# Patient Record
Sex: FEMALE | Race: WHITE | Employment: UNEMPLOYED | URBAN - METROPOLITAN AREA
[De-identification: names, ages, dates, MRNs, and addresses within clinical notes are randomized per-mention and may not be internally consistent; named-entity substitution may affect disease eponyms.]

---

## 2017-01-09 ENCOUNTER — ANESTHESIA EVENT (OUTPATIENT)
Dept: SURGERY | Age: 62
End: 2017-01-09
Payer: SELF-PAY

## 2017-01-09 ENCOUNTER — DOCUMENTATION ONLY (OUTPATIENT)
Dept: FAMILY MEDICINE CLINIC | Age: 62
End: 2017-01-09

## 2017-01-09 ENCOUNTER — TELEPHONE (OUTPATIENT)
Dept: FAMILY MEDICINE CLINIC | Age: 62
End: 2017-01-09

## 2017-01-09 NOTE — PROGRESS NOTES
Receipt of patient's Pap order of Detrol LA logged into the log book. Patient has an appointment on 17 at [address] [place] [address] at 2pm with Velvet Herndon. Routing to provider for dose confirmation and permission to deliver to patient. Nanci Vera RN  tolterodine ER (DETROL-LA) 2 mg ER capsule [[id number]]   Order Details   Dose: 2 mg Route: Oral Frequency: DAILY   Dispense Quantity:  30 Cap Refills:  0 Fills Remainin           Sig: Take 1 Cap by mouth daily.          Written Date:  16 Expiration Date:  --     Start Date:  16 End Date:  --            Ordering Provider:  -- NATA #:  -- NPI:  --    Authorizing Provider:   CARRIE Mcclain NATA #:  [id number] NPI:  [id number]    Diagnosis Association: Ureteral obstruction, left (N13.5)      Original Order:  tolterodine ER (DETROL-LA) 2 mg ER capsule [[id number]]      Ph[facility]:  [id number] [place] [address] NATA #:

## 2017-01-10 ENCOUNTER — APPOINTMENT (OUTPATIENT)
Dept: GENERAL RADIOLOGY | Age: 62
End: 2017-01-10
Attending: UROLOGY
Payer: SELF-PAY

## 2017-01-10 ENCOUNTER — ANESTHESIA (OUTPATIENT)
Dept: SURGERY | Age: 62
End: 2017-01-10
Payer: SELF-PAY

## 2017-01-10 ENCOUNTER — SURGERY (OUTPATIENT)
Age: 62
End: 2017-01-10

## 2017-01-10 PROCEDURE — 74011250636 HC RX REV CODE- 250/636: Performed by: UROLOGY

## 2017-01-10 PROCEDURE — 74011250636 HC RX REV CODE- 250/636

## 2017-01-10 PROCEDURE — 74011000250 HC RX REV CODE- 250

## 2017-01-10 PROCEDURE — 74420 UROGRAPHY RTRGR +-KUB: CPT

## 2017-01-10 PROCEDURE — 77030020782 HC GWN BAIR PAWS FLX 3M -B

## 2017-01-10 PROCEDURE — 77030008684 HC TU ET CUF COVD -B: Performed by: ANESTHESIOLOGY

## 2017-01-10 RX ORDER — LIDOCAINE HYDROCHLORIDE 20 MG/ML
INJECTION, SOLUTION EPIDURAL; INFILTRATION; INTRACAUDAL; PERINEURAL AS NEEDED
Status: DISCONTINUED | OUTPATIENT
Start: 2017-01-10 | End: 2017-01-10 | Stop reason: HOSPADM

## 2017-01-10 RX ORDER — MIDAZOLAM HYDROCHLORIDE 1 MG/ML
INJECTION, SOLUTION INTRAMUSCULAR; INTRAVENOUS AS NEEDED
Status: DISCONTINUED | OUTPATIENT
Start: 2017-01-10 | End: 2017-01-10 | Stop reason: HOSPADM

## 2017-01-10 RX ORDER — ONDANSETRON 2 MG/ML
INJECTION INTRAMUSCULAR; INTRAVENOUS AS NEEDED
Status: DISCONTINUED | OUTPATIENT
Start: 2017-01-10 | End: 2017-01-10 | Stop reason: HOSPADM

## 2017-01-10 RX ORDER — ESMOLOL HYDROCHLORIDE 10 MG/ML
INJECTION INTRAVENOUS AS NEEDED
Status: DISCONTINUED | OUTPATIENT
Start: 2017-01-10 | End: 2017-01-10 | Stop reason: HOSPADM

## 2017-01-10 RX ORDER — CEFAZOLIN SODIUM 1 G/3ML
INJECTION, POWDER, FOR SOLUTION INTRAMUSCULAR; INTRAVENOUS AS NEEDED
Status: DISCONTINUED | OUTPATIENT
Start: 2017-01-10 | End: 2017-01-10

## 2017-01-10 RX ORDER — FENTANYL CITRATE 50 UG/ML
INJECTION, SOLUTION INTRAMUSCULAR; INTRAVENOUS AS NEEDED
Status: DISCONTINUED | OUTPATIENT
Start: 2017-01-10 | End: 2017-01-10 | Stop reason: HOSPADM

## 2017-01-10 RX ORDER — SUCCINYLCHOLINE CHLORIDE 20 MG/ML
INJECTION INTRAMUSCULAR; INTRAVENOUS AS NEEDED
Status: DISCONTINUED | OUTPATIENT
Start: 2017-01-10 | End: 2017-01-10 | Stop reason: HOSPADM

## 2017-01-10 RX ORDER — PROPOFOL 10 MG/ML
INJECTION, EMULSION INTRAVENOUS AS NEEDED
Status: DISCONTINUED | OUTPATIENT
Start: 2017-01-10 | End: 2017-01-10 | Stop reason: HOSPADM

## 2017-01-10 RX ADMIN — ONDANSETRON 4 MG: 2 INJECTION INTRAMUSCULAR; INTRAVENOUS at 13:39

## 2017-01-10 RX ADMIN — MIDAZOLAM HYDROCHLORIDE 1 MG: 1 INJECTION, SOLUTION INTRAMUSCULAR; INTRAVENOUS at 13:23

## 2017-01-10 RX ADMIN — LIDOCAINE HYDROCHLORIDE 50 MG: 20 INJECTION, SOLUTION EPIDURAL; INFILTRATION; INTRACAUDAL; PERINEURAL at 13:29

## 2017-01-10 RX ADMIN — FENTANYL CITRATE 100 MCG: 50 INJECTION, SOLUTION INTRAMUSCULAR; INTRAVENOUS at 13:29

## 2017-01-10 RX ADMIN — LIDOCAINE HYDROCHLORIDE 100 MG: 20 INJECTION, SOLUTION INTRAVENOUS at 13:58

## 2017-01-10 RX ADMIN — CEFAZOLIN 2 G: 1 INJECTION, POWDER, FOR SOLUTION INTRAMUSCULAR; INTRAVENOUS; PARENTERAL at 13:38

## 2017-01-10 RX ADMIN — ESMOLOL HYDROCHLORIDE 20 MG: 10 INJECTION INTRAVENOUS at 13:56

## 2017-01-10 RX ADMIN — DIATRIZOATE MEGLUMINE 20 ML: 300 INJECTION, SOLUTION INTRAVENOUS at 13:59

## 2017-01-10 RX ADMIN — SUCCINYLCHOLINE CHLORIDE 100 MG: 20 INJECTION INTRAMUSCULAR; INTRAVENOUS at 13:29

## 2017-01-10 RX ADMIN — PROPOFOL 150 MG: 10 INJECTION, EMULSION INTRAVENOUS at 13:29

## 2017-01-10 NOTE — ANESTHESIA PREPROCEDURE EVALUATION
Anesthetic History   No history of anesthetic complications            Review of Systems / Medical History  Patient summary reviewed, nursing notes reviewed and pertinent labs reviewed    Pulmonary  Within defined limits                 Neuro/Psych         Psychiatric history    Comments: Mental retardation Cardiovascular    Hypertension: well controlled          Past MI, CAD and hyperlipidemia  Pertinent negatives: No cardiac stents       GI/Hepatic/Renal  Within defined limits             Comments: S/p nephrectomy Endo/Other    Diabetes: poorly controlled    Anemia     Other Findings              Physical Exam    Airway  Mallampati: II  TM Distance: < 4 cm  Neck ROM: normal range of motion   Mouth opening: Normal     Cardiovascular  Regular rate and rhythm,  S1 and S2 normal,  no murmur, click, rub, or gallop  Rhythm: regular  Rate: normal      Pertinent negatives: No murmur   Dental    Dentition: Edentulous and Full upper dentures     Pulmonary  Breath sounds clear to auscultation               Abdominal  GI exam deferred       Other Findings            Anesthetic Plan    ASA: 3  Anesthesia type: general          Induction: Intravenous  Anesthetic plan and risks discussed with: Patient

## 2017-01-10 NOTE — ANESTHESIA POSTPROCEDURE EVALUATION
Post-Anesthesia Evaluation and Assessment    Patient: Mary Alejandro MRN: [id number]  SSN: [id number]    YOB: 1955  Age: 62 y.o. Sex: female       Cardiovascular Function/Vital Signs  Visit Vitals    /68    Pulse (!) 117    Temp 36.6 °C (97.9 °F)    Resp 27    Ht 4' 6\" (1.372 m)    Wt 73.8 kg (162 lb 11.2 oz)    SpO2 99%    BMI 39.23 kg/m2       Patient is status post general anesthesia for Procedure(s):  CYSTOSCOPY WITH LEFT RETROGRADES LEFT STENT CHANGE . Nausea/Vomiting: None    Postoperative hydration reviewed and adequate. Pain:  Pain Scale 1: Numeric (0 - 10) (01/10/17 1416)  Pain Intensity 1: 0 (01/10/17 1416)   Managed    Neurological Status:   Neuro (WDL): Within Defined Limits (01/10/17 1416)  Neuro  LUE Motor Response: Purposeful (01/10/17 1416)  LLE Motor Response: Purposeful (01/10/17 1416)  RUE Motor Response: Purposeful (01/10/17 1416)  RLE Motor Response: Purposeful (01/10/17 1416)   At baseline    Mental Status and Level of Consciousness: Arousable    Pulmonary Status:   O2 Device: Room air (01/10/17 1416)   Adequate oxygenation and airway patent    Complications related to anesthesia: None    Post-anesthesia assessment completed.  No concerns    Signed By: Brian Storm MD     January 10, 2017

## 2017-01-11 ENCOUNTER — PATIENT OUTREACH (OUTPATIENT)
Dept: FAMILY MEDICINE CLINIC | Age: 62
End: 2017-01-11

## 2017-01-11 NOTE — PROGRESS NOTES
Recent Labs      01/10/17   1402   NA  139   K  4.4   CL  106   CO2  23   GLU  109*   BUN  17   CREA  1.28*   CA  8.2*     No components found for: GLPOC  No results for input(s): PH, PCO2, PO2, HCO3, FIO2 in the last 72 hours. No results for input(s): INR in the last 72 hours. No lab exists for component: INREXT    Language spoken:   [facility]:   Patient was offered the opportunity to discuss advance care planning:  no     Does patient have an Advance Directive:  no   If no, did you provide information on [facility]?  no     PCP/Specialist follow up: Patient scheduled to follow up with Alpesh Wang NP on 1/23/17. Reviewed red flags with patient, and patient verbalizes understanding. Patient given an opportunity to ask questions. No other clinical/social/functional needs noted. The patient agrees to contact the PCP office for questions related to their healthcare. The patient expressed thanks, offered no additional questions and ended the call. Care giver Arsenio José spoke for patient. Medication:   Performed medication reconciliation with patient, and patient verbalizes understanding of administration of home medications. There were no barriers to obtaining medications identified at this time. EDUCATION:   Medications, FU appt with Dr. Moisés Denny states that she was told to wait for UA, C/S results and they will schedule the appointment. Support system:  patient and other:  Care giver- Arsenio José    Discharge Instructions :  Reviewed discharge instructions with patient. Patient verbalizes understanding of discharge instructions and follow-up care. Red Flags:  Reviewed S/x and Sx of surgical complications. Denies any pain. Labs Reviewed:  No results for input(s): WBC, HGB, HCT, PLT, HGBEXT, HCTEXT, PLTEXT in the last 72 hours.       Medical History:     Past Medical History   Diagnosis Date    Anemia     CKD (chronic kidney disease), stage II      only has one kidney (on right)    Coronary artery disease 2014     A. Echo (14):  EF 45% with mid-distal septal/anterior HK, DD. PASP 37.    Coronary artery disease      MI 2014    Depression     Diabetes ([facility]     [facility]     History of nephrectomy      Right kidney    Hx MRSA infection      had negative swab 2013    Hyperlipidemia     Hypertension     Mental retardation     Nephrolithiasis      hx    Uterine cancer ([facility]      hx    UTI (lower urinary tract infection)     Vitamin D deficiency            Nurse Navigator(NN) contacted the patient by telephone to perform post same day surgery discharge assessment. Verified  and address with patient as identifiers. Provided introduction to self, and explanation of the Nurses Navigator role with contact information.       Diet:   Patient reports: Regular Diet    Activity:    Patient reports: mostly moving around the house

## 2017-01-11 NOTE — PROGRESS NOTES
[address]    Closing post hosp episode of care for [facility] admission from 11/28/16 to 12/1/16.  Pt had scheduled stent replacement surgery on 1/10/17 and had follow up call from Edith RODRIGUEZ Angie Hoops, RN

## 2017-01-13 ENCOUNTER — HOSPITAL ENCOUNTER (INPATIENT)
Age: 62
LOS: 3 days | Discharge: HOME | DRG: 700 | End: 2017-01-16
Attending: EMERGENCY MEDICINE | Admitting: INTERNAL MEDICINE
Payer: SELF-PAY

## 2017-01-13 DIAGNOSIS — N39.0 URINARY TRACT INFECTION, SITE UNSPECIFIED: Primary | ICD-10-CM

## 2017-01-13 PROBLEM — N18.30 CKD (CHRONIC KIDNEY DISEASE), STAGE III (HCC): Status: ACTIVE | Noted: 2017-01-13

## 2017-01-13 LAB
ALBUMIN SERPL BCP-MCNC: 3.1 G/DL (ref 3.5–5)
ALBUMIN/GLOB SERPL: 0.7 {RATIO} (ref 1.1–2.2)
ALP SERPL-CCNC: 116 U/L (ref 45–117)
ALT SERPL-CCNC: 14 U/L (ref 12–78)
ANION GAP BLD CALC-SCNC: 9 MMOL/L (ref 5–15)
APPEARANCE UR: CLEAR
AST SERPL W P-5'-P-CCNC: 24 U/L (ref 15–37)
BACTERIA URNS QL MICRO: ABNORMAL /HPF
BASOPHILS # BLD AUTO: 0 K/UL (ref 0–0.1)
BASOPHILS # BLD: 1 % (ref 0–1)
BILIRUB SERPL-MCNC: 0.2 MG/DL (ref 0.2–1)
BILIRUB UR QL: NEGATIVE
BUN SERPL-MCNC: 19 MG/DL (ref 6–20)
BUN/CREAT SERPL: 12 (ref 12–20)
CALCIUM SERPL-MCNC: 8.3 MG/DL (ref 8.5–10.1)
CHLORIDE SERPL-SCNC: 103 MMOL/L (ref 97–108)
CO2 SERPL-SCNC: 24 MMOL/L (ref 21–32)
COLOR UR: ABNORMAL
CREAT SERPL-MCNC: 1.6 MG/DL (ref 0.55–1.02)
EOSINOPHIL # BLD: 0.1 K/UL (ref 0–0.4)
EOSINOPHIL NFR BLD: 1 % (ref 0–7)
EPITH CASTS URNS QL MICRO: ABNORMAL /LPF
ERYTHROCYTE [DISTWIDTH] IN BLOOD BY AUTOMATED COUNT: 17.1 % (ref 11.5–14.5)
GLOBULIN SER CALC-MCNC: 4.4 G/DL (ref 2–4)
GLUCOSE BLD STRIP.AUTO-MCNC: 132 MG/DL (ref 65–100)
GLUCOSE SERPL-MCNC: 81 MG/DL (ref 65–100)
GLUCOSE UR STRIP.AUTO-MCNC: NEGATIVE MG/DL
HCT VFR BLD AUTO: 30.2 % (ref 35–47)
HGB BLD-MCNC: 9.1 G/DL (ref 11.5–16)
HGB UR QL STRIP: ABNORMAL
KETONES UR QL STRIP.AUTO: NEGATIVE MG/DL
LEUKOCYTE ESTERASE UR QL STRIP.AUTO: ABNORMAL
LYMPHOCYTES # BLD AUTO: 17 % (ref 12–49)
LYMPHOCYTES # BLD: 1.2 K/UL (ref 0.8–3.5)
MCH RBC QN AUTO: 27.1 PG (ref 26–34)
MCHC RBC AUTO-ENTMCNC: 30.1 G/DL (ref 30–36.5)
MCV RBC AUTO: 89.9 FL (ref 80–99)
MONOCYTES # BLD: 0.5 K/UL (ref 0–1)
MONOCYTES NFR BLD AUTO: 8 % (ref 5–13)
NEUTS SEG # BLD: 5.1 K/UL (ref 1.8–8)
NEUTS SEG NFR BLD AUTO: 73 % (ref 32–75)
NITRITE UR QL STRIP.AUTO: NEGATIVE
PH UR STRIP: 7 [PH] (ref 5–8)
PLATELET # BLD AUTO: 144 K/UL (ref 150–400)
POTASSIUM SERPL-SCNC: 4.7 MMOL/L (ref 3.5–5.1)
PROT SERPL-MCNC: 7.5 G/DL (ref 6.4–8.2)
PROT UR STRIP-MCNC: 30 MG/DL
RBC # BLD AUTO: 3.36 M/UL (ref 3.8–5.2)
RBC #/AREA URNS HPF: ABNORMAL /HPF (ref 0–5)
SERVICE CMNT-IMP: ABNORMAL
SODIUM SERPL-SCNC: 136 MMOL/L (ref 136–145)
SP GR UR REFRACTOMETRY: 1.01 (ref 1–1.03)
UA: UC IF INDICATED,UAUC: ABNORMAL
UROBILINOGEN UR QL STRIP.AUTO: 0.2 EU/DL (ref 0.2–1)
WBC # BLD AUTO: 6.9 K/UL (ref 3.6–11)
WBC URNS QL MICRO: ABNORMAL /HPF (ref 0–4)

## 2017-01-13 PROCEDURE — 36415 COLL VENOUS BLD VENIPUNCTURE: CPT | Performed by: EMERGENCY MEDICINE

## 2017-01-13 PROCEDURE — 80053 COMPREHEN METABOLIC PANEL: CPT | Performed by: EMERGENCY MEDICINE

## 2017-01-13 PROCEDURE — 87086 URINE CULTURE/COLONY COUNT: CPT | Performed by: INTERNAL MEDICINE

## 2017-01-13 PROCEDURE — 74011000258 HC RX REV CODE- 258: Performed by: EMERGENCY MEDICINE

## 2017-01-13 PROCEDURE — 74011250636 HC RX REV CODE- 250/636: Performed by: INTERNAL MEDICINE

## 2017-01-13 PROCEDURE — 82962 GLUCOSE BLOOD TEST: CPT

## 2017-01-13 PROCEDURE — 85025 COMPLETE CBC W/AUTO DIFF WBC: CPT | Performed by: EMERGENCY MEDICINE

## 2017-01-13 PROCEDURE — 74011250636 HC RX REV CODE- 250/636: Performed by: EMERGENCY MEDICINE

## 2017-01-13 PROCEDURE — 81001 URINALYSIS AUTO W/SCOPE: CPT | Performed by: EMERGENCY MEDICINE

## 2017-01-13 PROCEDURE — 87040 BLOOD CULTURE FOR BACTERIA: CPT | Performed by: EMERGENCY MEDICINE

## 2017-01-13 PROCEDURE — 74011250637 HC RX REV CODE- 250/637: Performed by: INTERNAL MEDICINE

## 2017-01-13 PROCEDURE — 65270000029 HC RM PRIVATE

## 2017-01-13 PROCEDURE — 99284 EMERGENCY DEPT VISIT MOD MDM: CPT

## 2017-01-13 RX ORDER — TOLTERODINE 2 MG/1
2 CAPSULE, EXTENDED RELEASE ORAL
Status: DISCONTINUED | OUTPATIENT
Start: 2017-01-13 | End: 2017-01-16 | Stop reason: HOSPADM

## 2017-01-13 RX ORDER — METOPROLOL TARTRATE 25 MG/1
12.5 TABLET, FILM COATED ORAL 2 TIMES DAILY
COMMUNITY
End: 2017-09-06 | Stop reason: SDUPTHER

## 2017-01-13 RX ORDER — GLIPIZIDE 2.5 MG/1
2.5 TABLET, EXTENDED RELEASE ORAL
Status: DISCONTINUED | OUTPATIENT
Start: 2017-01-14 | End: 2017-01-15

## 2017-01-13 RX ORDER — SODIUM CHLORIDE 0.9 % (FLUSH) 0.9 %
5-10 SYRINGE (ML) INJECTION AS NEEDED
Status: DISCONTINUED | OUTPATIENT
Start: 2017-01-13 | End: 2017-01-16 | Stop reason: HOSPADM

## 2017-01-13 RX ORDER — HYDROCODONE BITARTRATE AND ACETAMINOPHEN 5; 325 MG/1; MG/1
1 TABLET ORAL
Status: DISCONTINUED | OUTPATIENT
Start: 2017-01-13 | End: 2017-01-16 | Stop reason: HOSPADM

## 2017-01-13 RX ORDER — ASPIRIN 81 MG/1
81 TABLET ORAL
Status: DISCONTINUED | OUTPATIENT
Start: 2017-01-13 | End: 2017-01-16 | Stop reason: HOSPADM

## 2017-01-13 RX ORDER — PIOGLITAZONEHYDROCHLORIDE 15 MG/1
30 TABLET ORAL DAILY
Status: DISCONTINUED | OUTPATIENT
Start: 2017-01-14 | End: 2017-01-16 | Stop reason: HOSPADM

## 2017-01-13 RX ORDER — METOPROLOL TARTRATE 25 MG/1
12.5 TABLET, FILM COATED ORAL 2 TIMES DAILY
Status: DISCONTINUED | OUTPATIENT
Start: 2017-01-13 | End: 2017-01-16 | Stop reason: HOSPADM

## 2017-01-13 RX ORDER — SODIUM CHLORIDE 9 MG/ML
75 INJECTION, SOLUTION INTRAVENOUS CONTINUOUS
Status: DISCONTINUED | OUTPATIENT
Start: 2017-01-13 | End: 2017-01-16 | Stop reason: HOSPADM

## 2017-01-13 RX ORDER — SERTRALINE HYDROCHLORIDE 50 MG/1
50 TABLET, FILM COATED ORAL DAILY
Status: DISCONTINUED | OUTPATIENT
Start: 2017-01-14 | End: 2017-01-16 | Stop reason: HOSPADM

## 2017-01-13 RX ORDER — MAGNESIUM SULFATE 100 %
4 CRYSTALS MISCELLANEOUS AS NEEDED
Status: DISCONTINUED | OUTPATIENT
Start: 2017-01-13 | End: 2017-01-16 | Stop reason: HOSPADM

## 2017-01-13 RX ORDER — ATORVASTATIN CALCIUM 10 MG/1
10 TABLET, FILM COATED ORAL DAILY
Status: DISCONTINUED | OUTPATIENT
Start: 2017-01-14 | End: 2017-01-16 | Stop reason: HOSPADM

## 2017-01-13 RX ORDER — INSULIN LISPRO 100 [IU]/ML
INJECTION, SOLUTION INTRAVENOUS; SUBCUTANEOUS
Status: DISCONTINUED | OUTPATIENT
Start: 2017-01-13 | End: 2017-01-14

## 2017-01-13 RX ORDER — CEPHALEXIN 250 MG/1
250 CAPSULE ORAL DAILY
COMMUNITY
End: 2017-01-16

## 2017-01-13 RX ORDER — GLIPIZIDE 2.5 MG/1
2.5 TABLET, EXTENDED RELEASE ORAL 2 TIMES DAILY
COMMUNITY
End: 2017-01-16

## 2017-01-13 RX ORDER — PIOGLITAZONEHYDROCHLORIDE 30 MG/1
30 TABLET ORAL DAILY
COMMUNITY
End: 2017-05-24 | Stop reason: SDUPTHER

## 2017-01-13 RX ORDER — MULTIVITAMIN WITH IRON
1 TABLET ORAL DAILY
Status: DISCONTINUED | OUTPATIENT
Start: 2017-01-14 | End: 2017-01-16 | Stop reason: HOSPADM

## 2017-01-13 RX ORDER — HEPARIN SODIUM 5000 [USP'U]/ML
5000 INJECTION, SOLUTION INTRAVENOUS; SUBCUTANEOUS EVERY 8 HOURS
Status: DISCONTINUED | OUTPATIENT
Start: 2017-01-13 | End: 2017-01-16 | Stop reason: HOSPADM

## 2017-01-13 RX ORDER — LANOLIN ALCOHOL/MO/W.PET/CERES
1000 CREAM (GRAM) TOPICAL DAILY
Status: DISCONTINUED | OUTPATIENT
Start: 2017-01-14 | End: 2017-01-16 | Stop reason: HOSPADM

## 2017-01-13 RX ORDER — DEXTROSE 50 % IN WATER (D50W) INTRAVENOUS SYRINGE
12.5-25 AS NEEDED
Status: DISCONTINUED | OUTPATIENT
Start: 2017-01-13 | End: 2017-01-16 | Stop reason: HOSPADM

## 2017-01-13 RX ORDER — TOLTERODINE 2 MG/1
2 CAPSULE, EXTENDED RELEASE ORAL
COMMUNITY
End: 2017-06-30 | Stop reason: SDUPTHER

## 2017-01-13 RX ORDER — SODIUM CHLORIDE 0.9 % (FLUSH) 0.9 %
5-10 SYRINGE (ML) INJECTION EVERY 8 HOURS
Status: DISCONTINUED | OUTPATIENT
Start: 2017-01-13 | End: 2017-01-16 | Stop reason: HOSPADM

## 2017-01-13 RX ORDER — ASPIRIN 81 MG/1
81 TABLET ORAL
COMMUNITY

## 2017-01-13 RX ADMIN — METOPROLOL TARTRATE 12.5 MG: 25 TABLET ORAL at 23:05

## 2017-01-13 RX ADMIN — SODIUM CHLORIDE 75 ML/HR: 900 INJECTION, SOLUTION INTRAVENOUS at 23:05

## 2017-01-13 RX ADMIN — MEROPENEM 500 MG: 500 INJECTION, POWDER, FOR SOLUTION INTRAVENOUS at 20:39

## 2017-01-13 RX ADMIN — ASPIRIN 81 MG: 81 TABLET, COATED ORAL at 23:05

## 2017-01-13 RX ADMIN — Medication 10 ML: at 23:10

## 2017-01-13 RX ADMIN — HEPARIN SODIUM 5000 UNITS: 5000 INJECTION, SOLUTION INTRAVENOUS; SUBCUTANEOUS at 23:06

## 2017-01-13 RX ADMIN — TOLTERODINE TARTRATE 2 MG: 2 CAPSULE, EXTENDED RELEASE ORAL at 23:05

## 2017-01-13 NOTE — IP AVS SNAPSHOT
Current Discharge Medication List  
  
Take these medications at their scheduled times Dose & Instructions Dispensing Information Comments Morning Noon Evening Bedtime ACTOS 30 mg tablet Generic drug:  pioglitazone Your next dose is: Today, Tomorrow Other:  ____________ Dose:  30 mg Take 30 mg by mouth daily. Refills:  0  
     
   
   
   
  
 aspirin delayed-release 81 mg tablet Your next dose is: Today, Tomorrow Other:  ____________ Dose:  81 mg Take 81 mg by mouth nightly. Refills:  0  
     
   
   
   
  
 atorvastatin 10 mg tablet Commonly known as:  LIPITOR Your next dose is: Today, Tomorrow Other:  ____________ Dose:  10 mg Take 1 Tab by mouth daily. To start this when you run out of crestor Quantity:  90 Tab Refills:  1  
     
   
   
   
  
 cyanocobalamin 1,000 mcg tablet Your next dose is: Today, Tomorrow Other:  ____________ Dose:  1000 mcg Take 1 Tab by mouth daily. For B12 Quantity:  180 Tab Refills:  1  
     
   
   
   
  
 glipiZIDE SR 2.5 mg CR tablet Commonly known as:  GLUCOTROL XL Your next dose is: Today, Tomorrow Other:  ____________ Dose:  2.5 mg Take 1 Tab by mouth Daily (before breakfast). Quantity:  30 Tab Refills:  0  
     
   
   
   
  
 metoprolol tartrate 25 mg tablet Commonly known as:  LOPRESSOR Your next dose is: Today, Tomorrow Other:  ____________ Dose:  12.5 mg Take 12.5 mg by mouth two (2) times a day. Refills:  0  
     
   
   
   
  
 multivitamin with iron tablet Your next dose is: Today, Tomorrow Other:  ____________ Dose:  1 Tab Take 1 Tab by mouth daily. Refills:  0  
     
   
   
   
  
 tolterodine ER 2 mg ER capsule Commonly known as:  DETROL-LA Your next dose is: Today, Tomorrow Other:  ____________ Dose:  2 mg Take 2 mg by mouth nightly. Refills:  0 Take these medications as needed Dose & Instructions Dispensing Information Comments Morning Noon Evening Bedtime HYDROcodone-acetaminophen 5-325 mg per tablet Commonly known as:  Sean Andres Your next dose is: Today, Tomorrow Other:  ____________ Dose:  1 Tab Take 1 Tab by mouth every four (4) hours as needed for up to 15 doses. Max Daily Amount: 6 Tabs. Quantity:  15 Tab Refills:  0 Take these medications as directed Dose & Instructions Dispensing Information Comments Morning Noon Evening Bedtime  
 sertraline 50 mg tablet Commonly known as:  ZOLOFT Your next dose is: Today, Tomorrow Other:  ____________  
   
   
 take 1 tablet by mouth once daily (BEFORE BREAKFAST) Quantity:  90 Tab Refills:  3 Where to Get Your Medications Information about where to get these medications is not yet available ! Ask your nurse or doctor about these medications  
  glipiZIDE SR 2.5 mg CR tablet

## 2017-01-13 NOTE — Clinical Note
Status[de-identified] Inpatient [101] Type of Bed: Medical [8] Inpatient Hospitalization Certified Necessary for the Following Reasons: 3. Patient receiving treatment that can only be provided in an inpatient setting (further clarification in H&P documentation) Admitting Diagnosis: UTI (urinary tract infection) [604526] Admitting Physician: Mary Recinos Attending Physician: Mary Recinos Estimated Length of Stay: 3-4 Midnights Discharge Plan[de-identified] Home with Office Follow-up

## 2017-01-13 NOTE — ED TRIAGE NOTES
Called to come to hospital by Dr. Thomas Bang because culture indicated she needs IV antibiotics for infection, has a stent left ureter. Arrives with caregiver from group home.

## 2017-01-13 NOTE — IP AVS SNAPSHOT
Jeannette Misti 
 
 
 [address] [facility] 
[phone] Patient: Criss Robins MRN: [id number] HSP:[id number] You are allergic to the following Allergen Reactions Hydrocodone Nausea Only Ibuprofen Unknown (comments) Pt cannot take because of having only one kidney Penicillins Rash Tolerates Cephalexin Recent Documentation Height Weight Breastfeeding? BMI OB Status Smoking Status 1.422 m 72.8 kg No 35.98 kg/m2 Hysterectomy Never Smoker Unresulted Labs Order Current Status CULTURE, BLOOD, PAIRED Preliminary result Emergency Contacts Name Discharge Info Relation Home Work Mobile Nicole Dangelo DISCHARGE CAREGIVER [3] Caregiver [phone] DISCHARGE CAREGIVER [3] Caregiver [phone] About your hospitalization You were admitted on:  January 13, 2017 You last received care in the:  [facility] 5M1 MED SURG 1 You were discharged on:  January 16, 2017 Unit phone number:  [phone] Why you were hospitalized Your primary diagnosis was:  Not on File Your diagnoses also included:  Uti (Urinary Tract Infection), Ureteral Obstruction, Left, Mental Retardation, Hyperlipidemia, History Of Nephrectomy, Dm (Diabetes Mellitus) (Hcc), Coronary Artery Disease, Ckd (Chronic Kidney Disease), Stage Iii, Thrombocytopenia (Hcc) Providers Seen During Your Hospitalizations Provider Role Specialty Primary office phone Jesus Carrera. Cristopher Menard MD Attending Provider Emergency Medicine [phone] Nury Moyer MD Attending Provider Emergency Medicine [phone] Iain Amezquita MD Attending Provider Hospitalist [phone] Aniket Lobato MD Attending Provider Internal Medicine [phone] Your Primary Care Physician (PCP) Primary Care Physician Office Phone Office Fax [address] , [phone] Follow-up Information Follow up With Details Comments Contact Info CARRIE Telles Schedule an appointment as soon as possible for a visit in 1 week  651 [facility] 57 
[phone] Marta Starr MD Schedule an appointment as soon as possible for a visit in 1 week  402 [facility] 1330 [facility] 57 
[phone] Your Appointments Monday January 23, 2017  2:00 PM EST Follow Up with Payal Posada, [facility] ([address]) 250 [facility] [address] [facility] 57  
[phone] Current Discharge Medication List  
  
CONTINUE these medications which have CHANGED Dose & Instructions Dispensing Information Comments Morning Noon Evening Bedtime  
 glipiZIDE SR 2.5 mg CR tablet Commonly known as:  GLUCOTROL XL What changed:  when to take this Your next dose is: Today, Tomorrow Other:  _________ Dose:  2.5 mg Take 1 Tab by mouth Daily (before breakfast). Quantity:  30 Tab Refills:  0 CONTINUE these medications which have NOT CHANGED Dose & Instructions Dispensing Information Comments Morning Noon Evening Bedtime ACTOS 30 mg tablet Generic drug:  pioglitazone Your next dose is: Today, Tomorrow Other:  _________ Dose:  30 mg Take 30 mg by mouth daily. Refills:  0  
     
   
   
   
  
 aspirin delayed-release 81 mg tablet Your next dose is: Today, Tomorrow Other:  _________ Dose:  81 mg Take 81 mg by mouth nightly. Refills:  0  
     
   
   
   
  
 atorvastatin 10 mg tablet Commonly known as:  LIPITOR Your next dose is: Today, Tomorrow Other:  _________ Dose:  10 mg Take 1 Tab by mouth daily. To start this when you run out of crestor Quantity:  90 Tab Refills:  1 cyanocobalamin 1,000 mcg tablet Your next dose is: Today, Tomorrow Other:  _________ Dose:  1000 mcg Take 1 Tab by mouth daily. For B12 Quantity:  180 Tab Refills:  1 HYDROcodone-acetaminophen 5-325 mg per tablet Commonly known as:  Rolinda Doles Your next dose is: Today, Tomorrow Other:  _________ Dose:  1 Tab Take 1 Tab by mouth every four (4) hours as needed for up to 15 doses. Max Daily Amount: 6 Tabs. Quantity:  15 Tab Refills:  0  
     
   
   
   
  
 metoprolol tartrate 25 mg tablet Commonly known as:  LOPRESSOR Your next dose is: Today, Tomorrow Other:  _________ Dose:  12.5 mg Take 12.5 mg by mouth two (2) times a day. Refills:  0  
     
   
   
   
  
 multivitamin with iron tablet Your next dose is: Today, Tomorrow Other:  _________ Dose:  1 Tab Take 1 Tab by mouth daily. Refills:  0  
     
   
   
   
  
 sertraline 50 mg tablet Commonly known as:  ZOLOFT Your next dose is: Today, Tomorrow Other:  _________  
   
   
 take 1 tablet by mouth once daily (BEFORE BREAKFAST) Quantity:  90 Tab Refills:  3  
     
   
   
   
  
 tolterodine ER 2 mg ER capsule Commonly known as:  DETROL-LA Your next dose is: Today, Tomorrow Other:  _________ Dose:  2 mg Take 2 mg by mouth nightly. Refills:  0 STOP taking these medications   
 fluconazole 50 mg tablet Commonly known as:  DIFLUCAN  
   
  
 KEFLEX 250 mg capsule Generic drug:  cephALEXin  
   
  
 trimethoprim-sulfamethoxazole 160-800 mg per tablet Commonly known as:  BACTRIM DS, SEPTRA DS Where to Get Your Medications Information on where to get these meds will be given to you by the nurse or doctor. ! Ask your nurse or doctor about these medications  
  glipiZIDE SR 2.5 mg CR tablet Discharge Instructions HOSPITALIST DISCHARGE INSTRUCTIONS 
NAME: Pilar Patrick :  1955 MRN:  [id number] Date/Time:  2017 11:47 AM 
 
ADMIT DATE: 2017 DISCHARGE DATE: 2017 ADMITTING DIAGNOSIS: 
Urinary tract infection. History of ESBL infection DISCHARGE DIAGNOSIS: 
same MEDICATIONS: 
See after visit summary · It is important that you take the medication exactly as they are prescribed. · Keep your medication in the bottles provided by the pharmacist and keep a list of the medication names, dosages, and times to be taken in your wallet. · Do not take other medications without consulting your doctor Pain Management: per above medications What to do at [facility] Recommended diet:  Diabetic Diet Recommended activity: Activity as tolerated 1) Return to the hospital if you feel worse 2) If you experience any of the following symptoms then please call your primary care physician or return to the emergency room if you cannot get hold of your doctor: 
Fever, chills, nausea, vomiting, diarrhea, change in mentation, falling, bleeding, shortness of breath, chest pain, severe headache, severe abdominal pain, 3) please follow up with your doctors as instructed 4) Only take your glipizide in the morning. Stop taking it in the evening due to low blood sugar Follow Up: Follow-up Information Follow up With Details Comments Contact Info CARRIE Redd Schedule an appointment as soon as possible for a visit in 1 week  651 [facility] 57 
[phone] Maritza Leblanc MD Schedule an appointment as soon as possible for a visit in 1 week  [address] [place] [address] 
[phone] Information obtained by : 
I understand that if any problems occur once I am at home I am to contact my physician. I understand and acknowledge receipt of the instructions indicated above. Physician's or R.N.'s Signature                                                                  Date/Time Patient or Representative Signature                                                          Date/Time Learning About ESBL Infection What is an ESBL infection? ESBL stands for extended spectrum beta-lactamase. It's an enzyme found in some strains of bacteria. ESBL-producing bacteria can't be killed by many of the antibiotics that doctors use to treat infections, like penicillins and some cephalosporins. This makes it harder to treat. An infection with ESBL germs can be in any part of the body, including blood, organs, skin, and sites where surgery was done. There are many ways ESBL germs can be spread. The most common ways are by touching a person or thing that has the bacteria on it. The infection is more likely to spread in a hospital. For some people, especially those who are weak or ill, an ESBL infection can be serious. What are the symptoms? Symptoms of an ESBL infection are similar to other infections. Which symptoms you have will depend on where the infection is. For example: · If the infection is in the skin, that area may be red or tender. · If it's in the urinary tract, you may have back pain, a burning sensation when you urinate, or a need to urinate more often than usual. 
· If it's in the lungs, you may have a cough and trouble breathing. You may also: 
· Have diarrhea. · Feel weak and sick. · Have fever and chills. How is an ESBL infection treated? Your doctor will send a sample of your infected skin tissue, blood, or urine to a lab. The lab will grow the germs from the sample.  Then they will test them to see which antibiotics can kill them. To treat the infection, your doctor will give you antibiotics. Take your antibiotics as directed. Do not stop taking them just because you feel better. You need to take the full course of antibiotics. Taking only some of the medicine may cause antibiotic-resistant bacteria to develop. You may have to stay in the hospital for treatment. How can you prevent the spread of an ESBL infection? If you have an ESBL infection in the hospital: 
· Your doctor may want to keep you away from others to reduce the chances of spreading the bacteria. You may be in a special room, called an isolation room. Visitors may be limited to prevent ESBL germs from being carried outside your room. Children, pregnant women, people who are ill, and some others might not be allowed into the room. That's because they can be more likely to get a serious infection. · Everyone who comes in the room will need to wash their hands with soap and water or use an alcohol-based hand . Clean hands help stop the germs from spreading. · Visitors and caregivers may have to use disposable gloves and a gown over their clothes. This helps prevent ESBL germs from spreading. Practice good hygiene · Wash your hands often. Hand-washing is the best way to avoid spreading germs. When washing: 
[facility] AUTHORITY your hands thoroughly with soap and clean, running water. ¨ Rub your hands together for at least 20 seconds. ¨ Pay special attention to your wrists, the backs of your hands, between your fingers, and under your fingernails. ¨ Don't touch the faucet with your hand. Use a paper towel to turn off the water. ¨ You can also use an alcohol-based hand . If you use , rub your hands and fingers until they are dry. You don't need to use water. The alcohol quickly kills many types of germs on your hands.  
· If you're in the hospital, ask everyone to wash their hands before they come in the room. · Keep cuts and scrapes clean and covered with a bandage. Wash your hands after you touch elastic bandages or other dressings over a wound. This can keep bacteria from spreading. Wrap bandages in a plastic bag before you throw them away. Avoid contact with other people's wounds or bandages. · Do not share towels, washcloths, razors, clothing, or other items that touched your wound or bandage. Wash your sheets, towels, and clothes with warm water and detergent. Dry them in a hot dryer, if you can. · Keep shared areas clean by wiping down surfaces (such as counters, doorknobs, and light switches) with a disinfectant. Follow-up care is a key part of your treatment and safety. Be sure to make and go to all appointments, and call your doctor if you are having problems. It's also a good idea to know your test results and keep a list of the medicines you take. Where can you learn more? Go to http://antoni-jacqui.info/. Enter N221 in the search box to learn more about \"Learning About ESBL Infection. \" Current as of: July 27, 2016 Content Version: 11.1 © 7463-2578 [employer]â„¢ Network. Care instructions adapted under license by [employer] (which disclaims liability or warranty for this information). If you have questions about a medical condition or this instruction, always ask your healthcare professional. [place] [address] any warranty or liability for your use of this information. Urinary Tract Infection in Women: Care Instructions Your Care Instructions A urinary tract infection, or UTI, is a general term for an infection anywhere between the kidneys and the urethra (where urine comes out). Most UTIs are bladder infections. They often cause pain or burning when you urinate. UTIs are caused by bacteria and can be cured with antibiotics. Be sure to complete your treatment so that the infection goes away. Follow-up care is a key part of your treatment and safety. Be sure to make and go to all appointments, and call your doctor if you are having problems. It's also a good idea to know your test results and keep a list of the medicines you take. How can you care for yourself at home? · Take your antibiotics as directed. Do not stop taking them just because you feel better. You need to take the full course of antibiotics. · Drink extra water and other fluids for the next day or two. This may help wash out the bacteria that are causing the infection. (If you have kidney, heart, or liver disease and have to limit fluids, talk with your doctor before you increase your fluid intake.) · Avoid drinks that are carbonated or have caffeine. They can irritate the bladder. · Urinate often. Try to empty your bladder each time. · To relieve pain, take a hot bath or lay a heating pad set on low over your lower belly or genital area. Never go to sleep with a heating pad in place. To prevent UTIs · Drink plenty of water each day. This helps you urinate often, which clears bacteria from your system. (If you have kidney, heart, or liver disease and have to limit fluids, talk with your doctor before you increase your fluid intake.) · Consider adding cranberry juice to your diet. · Urinate when you need to. · Urinate right after you have sex. · Change sanitary pads often. · Avoid douches, bubble baths, feminine hygiene sprays, and other feminine hygiene products that have deodorants. · After going to the bathroom, wipe from front to back. When should you call for help? Call your doctor now or seek immediate medical care if: · Symptoms such as fever, chills, nausea, or vomiting get worse or appear for the first time. · You have new pain in your back just below your rib cage. This is called flank pain. · There is new blood or pus in your urine. · You have any problems with your antibiotic medicine. Watch closely for changes in your health, and be sure to contact your doctor if: 
· You are not getting better after taking an antibiotic for 2 days. · Your symptoms go away but then come back. Where can you learn more? Go to http://antoni-jacqui.info/. Enter P967 in the search box to learn more about \"Urinary Tract Infection in Women: Care Instructions. \" Current as of: August 12, 2016 Content Version: 11.1 © 6758-8644 [employer]. Care instructions adapted under license by [employer] (which disclaims liability or warranty for this information). If you have questions about a medical condition or this instruction, always ask your healthcare professional. [address] any warranty or liability for your use of this information. Discharge Orders None [employer] Announcement We are excited to announce that we are making your provider's discharge notes available to you in [employer]. You will see these notes when they are completed and signed by the physician that discharged you from your recent hospital stay. If you have any questions or concerns about any information you see in [employer], please call the Health Information Department where you were seen or reach out to your Primary Care Provider for more information about your plan of care. Introducing [facility]! Kevin Donahue introduces [employer] patient portal. Now you can access parts of your medical record, email your doctor's office, and request medication refills online. 1. In your internet browser, go to [email or web address] 2. Click on the First Time User? Click Here link in the Sign In box. You will see the New Member Sign Up page. 3. Enter your [employer] Access Code exactly as it appears below. You will not need to use this code after youve completed the sign-up process.  If you do not sign up before the expiration date, you must request a new code. 
 
· [employer] Access Code: [email or web address] Expires: 2/26/2017  4:09 PM 
 
4. Enter the last four digits of your Social Security Number (xxxx) and Date of Birth (mm/dd/yyyy) as indicated and click Submit. You will be taken to the next sign-up page. 5. Create a Ventive ID. This will be your Ventive login ID and cannot be changed, so think of one that is secure and easy to remember. 6. Create a [employer] password. You can change your password at any time. 7. Enter your Password Reset Question and Answer. This can be used at a later time if you forget your password. 8. Enter your e-mail address. You will receive e-mail notification when new information is available in [address] 9. Click Sign Up. You can now view and download portions of your medical record. 10. Click the Download Summary menu link to download a portable copy of your medical information. If you have questions, please visit the Frequently Asked Questions section of the [employer] website. Remember, [employer] is NOT to be used for urgent needs. For medical emergencies, dial 911. Now available from your iPhone and Android! General Information Please provide this summary of care documentation to your next provider. Patient Signature:  ____________________________________________________________ Date:  ____________________________________________________________  
  
Ivan Clemons Provider Signature:  ____________________________________________________________ Date:  ____________________________________________________________

## 2017-01-13 NOTE — ED NOTES
Assumed care of pt. Pt resting in stretcher in low/locked position and call bell within reach. Introduced self as primary nurse. Discussed plan of care with patient. Opportunity to ask questions given. Patient advised that medical information will be discussed and it is their own responsibility to let nurse know if such conversation should not take place in presence of visitors. Pt verbalizes understanding. Pt. Denies any additional complaints at this time.

## 2017-01-13 NOTE — ED PROVIDER NOTES
HPI Comments: 62 y.o. female with past medical history significant for UTI, CKD, DM type 2, mental retardation, CAD, anemia, uterine cancer, nephrolithiasis, and depression who presents for evaluation of UTI. Per caregiver, patient is currently being treated for UTI, and had cultures drawn 3 days ago. Caregiver was contacted earlier today by Urology, who referred the patient to the ED due to concerns regarding culture results. Dr. Genesis Connor recommend admission for IV ABX. Patient denies any fever or vomiting. There are no other acute medical concerns at this time. Social hx: +; lives at home with   PCP: Gonsalo Shi [place]  Urologist: Katharyn Sicard. Genesis Connor MD    Chart Review:  - Admitted on 1/10/17 for cystoscopy with left retrogrades left stent change. Note written by Valeriy Tamayo, as dictated by Glenn Mendes. Mary Harry MD 6:23 PM    The history is provided by the patient and a caregiver. Past Medical History:   Diagnosis Date    Anemia     CKD (chronic kidney disease), stage II      only has one kidney (on right)    Coronary artery disease 9/17/2014     A. Echo (9/16/14):  EF 45% with mid-distal septal/anterior HK, DD.   PASP 37.    Coronary artery disease      MI 9/2014    Depression     Diabetes ([address]     DM type 2 causing renal disease ([address]     History of nephrectomy      Right kidney    Hx MRSA infection      had negative swab 12/30/2013    Hyperlipidemia     Hypertension     Mental retardation     Nephrolithiasis      hx    Uterine cancer ([address]      hx    UTI (lower urinary tract infection)     Vitamin D deficiency        Past Surgical History:   Procedure Laterality Date    Hx orthopaedic       ORIF left arm, hx of fracture    Hx orthopaedic       Right ankle fx repair    Hx cholecystectomy      Hx hysterectomy      Hx dilation and curettage      Hx other surgical  9/2014     I&D right leg wound    Hx urological  1998     right nephrectomy    Hx urological Left      multiple renal stents         Family History:   Problem Relation Age of Onset    Mental Retardation Mother     Heart Disease Mother     Cancer Sister      breast    Malignant Hyperthermia Neg Hx     Pseudocholinesterase Deficiency Neg Hx     Delayed Awakening Neg Hx     Post-op Nausea/Vomiting Neg Hx     Emergence Delirium Neg Hx     Post-op Cognitive Dysfunction Neg Hx        Social History     Social History    Marital status:      Spouse name: N/A    Number of children: N/A    Years of education: N/A     Occupational History    Not on file. Social History Main Topics    Smoking status: Never Smoker    Smokeless tobacco: Never Used    Alcohol use No    Drug use: No    Sexual activity: Not on file     Other Topics Concern    Not on file     Social History Narrative         ALLERGIES: Hydrocodone; Ibuprofen; and Penicillins    Review of Systems   Constitutional: Negative for chills and fever. HENT: Negative for ear pain and sore throat. Eyes: Negative for pain. Respiratory: Negative for chest tightness and shortness of breath. Cardiovascular: Negative for chest pain and leg swelling. Gastrointestinal: Negative for abdominal pain, nausea and vomiting. Musculoskeletal: Negative for back pain. Skin: Negative for rash. Neurological: Negative for headaches. All other systems reviewed and are negative. Vitals:    01/13/17 1802   BP: 144/67   Pulse: 97   Resp: 18   Temp: 98.2 °F (36.8 °C)   SpO2: 97%   Weight: 72.8 kg (160 lb 7.9 oz)   Height: 4' 8\" (1.422 m)            Physical Exam   Constitutional: She appears well-developed and well-nourished. No distress. HENT:   Head: Normocephalic and atraumatic. Mouth/Throat: Oropharynx is clear and moist.   Eyes: EOM are normal. Pupils are equal, round, and reactive to light. No scleral icterus. Neck: Neck supple. No tracheal deviation present.    Cardiovascular: Normal rate, regular rhythm and normal heart sounds. Pulmonary/Chest: Effort normal and breath sounds normal. No respiratory distress. Abdominal: Soft. She exhibits no distension. There is no tenderness. There is no rebound and no guarding. Genitourinary:   Genitourinary Comments: deferred   Musculoskeletal: She exhibits no edema. Neurological: She is alert. Skin: Skin is warm and dry. Psychiatric: She has a normal mood and affect. Nursing note and vitals reviewed. Note written by Valeriy Mccoy, as dictated by April Connelly. Alen Black MD 6:23 PM    [facility]  ED Course       Procedures    Pt signed out to my by Dr. Alen Black pending results of labs. Pt has of multidrug resistant UTI's.   To be admitted by hospitalist.

## 2017-01-14 LAB
ANION GAP BLD CALC-SCNC: 7 MMOL/L (ref 5–15)
BUN SERPL-MCNC: 21 MG/DL (ref 6–20)
BUN/CREAT SERPL: 12 (ref 12–20)
CALCIUM SERPL-MCNC: 8.2 MG/DL (ref 8.5–10.1)
CHLORIDE SERPL-SCNC: 106 MMOL/L (ref 97–108)
CO2 SERPL-SCNC: 27 MMOL/L (ref 21–32)
CREAT SERPL-MCNC: 1.69 MG/DL (ref 0.55–1.02)
ERYTHROCYTE [DISTWIDTH] IN BLOOD BY AUTOMATED COUNT: 16.8 % (ref 11.5–14.5)
GLUCOSE BLD STRIP.AUTO-MCNC: 122 MG/DL (ref 65–100)
GLUCOSE BLD STRIP.AUTO-MCNC: 139 MG/DL (ref 65–100)
GLUCOSE BLD STRIP.AUTO-MCNC: 165 MG/DL (ref 65–100)
GLUCOSE BLD STRIP.AUTO-MCNC: 72 MG/DL (ref 65–100)
GLUCOSE BLD STRIP.AUTO-MCNC: 79 MG/DL (ref 65–100)
GLUCOSE BLD STRIP.AUTO-MCNC: 90 MG/DL (ref 65–100)
GLUCOSE SERPL-MCNC: 62 MG/DL (ref 65–100)
HCT VFR BLD AUTO: 28.9 % (ref 35–47)
HGB BLD-MCNC: 9.3 G/DL (ref 11.5–16)
MCH RBC QN AUTO: 29.1 PG (ref 26–34)
MCHC RBC AUTO-ENTMCNC: 32.2 G/DL (ref 30–36.5)
MCV RBC AUTO: 90.3 FL (ref 80–99)
PLATELET # BLD AUTO: 140 K/UL (ref 150–400)
POTASSIUM SERPL-SCNC: 4.4 MMOL/L (ref 3.5–5.1)
RBC # BLD AUTO: 3.2 M/UL (ref 3.8–5.2)
SERVICE CMNT-IMP: ABNORMAL
SERVICE CMNT-IMP: NORMAL
SODIUM SERPL-SCNC: 140 MMOL/L (ref 136–145)
WBC # BLD AUTO: 5.5 K/UL (ref 3.6–11)

## 2017-01-14 PROCEDURE — 74011250637 HC RX REV CODE- 250/637: Performed by: INTERNAL MEDICINE

## 2017-01-14 PROCEDURE — 74011000258 HC RX REV CODE- 258: Performed by: INTERNAL MEDICINE

## 2017-01-14 PROCEDURE — 74011250636 HC RX REV CODE- 250/636: Performed by: INTERNAL MEDICINE

## 2017-01-14 PROCEDURE — 85027 COMPLETE CBC AUTOMATED: CPT | Performed by: INTERNAL MEDICINE

## 2017-01-14 PROCEDURE — 82962 GLUCOSE BLOOD TEST: CPT

## 2017-01-14 PROCEDURE — 80048 BASIC METABOLIC PNL TOTAL CA: CPT | Performed by: INTERNAL MEDICINE

## 2017-01-14 PROCEDURE — 65270000029 HC RM PRIVATE

## 2017-01-14 PROCEDURE — 36415 COLL VENOUS BLD VENIPUNCTURE: CPT | Performed by: INTERNAL MEDICINE

## 2017-01-14 RX ORDER — INSULIN LISPRO 100 [IU]/ML
INJECTION, SOLUTION INTRAVENOUS; SUBCUTANEOUS
Status: DISCONTINUED | OUTPATIENT
Start: 2017-01-14 | End: 2017-01-15

## 2017-01-14 RX ADMIN — Medication 1 TABLET: at 08:31

## 2017-01-14 RX ADMIN — METOPROLOL TARTRATE 12.5 MG: 25 TABLET ORAL at 17:16

## 2017-01-14 RX ADMIN — HEPARIN SODIUM 5000 UNITS: 5000 INJECTION, SOLUTION INTRAVENOUS; SUBCUTANEOUS at 05:13

## 2017-01-14 RX ADMIN — PIOGLITAZONE 30 MG: 15 TABLET ORAL at 08:31

## 2017-01-14 RX ADMIN — HEPARIN SODIUM 5000 UNITS: 5000 INJECTION, SOLUTION INTRAVENOUS; SUBCUTANEOUS at 21:39

## 2017-01-14 RX ADMIN — MEROPENEM 500 MG: 500 INJECTION, POWDER, FOR SOLUTION INTRAVENOUS at 17:16

## 2017-01-14 RX ADMIN — MEROPENEM 500 MG: 500 INJECTION, POWDER, FOR SOLUTION INTRAVENOUS at 02:15

## 2017-01-14 RX ADMIN — METOPROLOL TARTRATE 12.5 MG: 25 TABLET ORAL at 08:30

## 2017-01-14 RX ADMIN — MEROPENEM 500 MG: 500 INJECTION, POWDER, FOR SOLUTION INTRAVENOUS at 08:31

## 2017-01-14 RX ADMIN — SODIUM CHLORIDE 75 ML/HR: 900 INJECTION, SOLUTION INTRAVENOUS at 17:18

## 2017-01-14 RX ADMIN — ATORVASTATIN CALCIUM 10 MG: 10 TABLET, FILM COATED ORAL at 08:31

## 2017-01-14 RX ADMIN — ASPIRIN 81 MG: 81 TABLET, COATED ORAL at 21:38

## 2017-01-14 RX ADMIN — HEPARIN SODIUM 5000 UNITS: 5000 INJECTION, SOLUTION INTRAVENOUS; SUBCUTANEOUS at 14:34

## 2017-01-14 RX ADMIN — HYDROCODONE BITARTRATE AND ACETAMINOPHEN 1 TABLET: 5; 325 TABLET ORAL at 02:14

## 2017-01-14 RX ADMIN — SERTRALINE 50 MG: 50 TABLET, FILM COATED ORAL at 08:31

## 2017-01-14 RX ADMIN — Medication 1000 MCG: at 08:30

## 2017-01-14 RX ADMIN — GLIPIZIDE 2.5 MG: 2.5 TABLET, FILM COATED, EXTENDED RELEASE ORAL at 08:31

## 2017-01-14 RX ADMIN — TOLTERODINE TARTRATE 2 MG: 2 CAPSULE, EXTENDED RELEASE ORAL at 21:38

## 2017-01-14 RX ADMIN — GLIPIZIDE 2.5 MG: 2.5 TABLET, FILM COATED, EXTENDED RELEASE ORAL at 17:16

## 2017-01-14 RX ADMIN — Medication 10 ML: at 05:13

## 2017-01-14 RX ADMIN — MEROPENEM 500 MG: 500 INJECTION, POWDER, FOR SOLUTION INTRAVENOUS at 23:47

## 2017-01-14 RX ADMIN — Medication 10 ML: at 21:39

## 2017-01-14 NOTE — PROGRESS NOTES
[facility]: MED RECONCILIATION    Actos, Glipizide, Tolterodine were to stopped at discharge Dec 2017  - caregiver indicates pt has continued to take (it may have been a temporary stoppage give recent BIA and unliateral nephrectomy)  - medications had already been added back to list and confirmed with caregiver that pt is taking      Bactrim, Fluconazole, Keflex  - current organism resistant to antibiotics  - completed fluconazole this morning  - Keflexx appears to to be ppx dose  - would use Cefepime for now    Information obtained from: Caregiver, RxQuery, recent medical records      Allergies: Hydrocodone; Ibuprofen; and Penicillins    Prior to Admission Medications:     Medication Documentation Review Audit       Reviewed by Ulysses Heater. Brenton Land (Pharmacist) on 01/13/17 at [address] [facility] Provider Last Dose Status Taking?      aspirin delayed-release 81 mg tablet Take 81 mg by mouth nightly. Historical Provider 1/12/2017 PM Active Yes    atorvastatin (LIPITOR) 10 mg tablet Take 1 Tab by mouth daily. To start this when you run out of crestor Joni CARRIE Wagner 1/13/2017 AM Active Yes    cephALEXin (KEFLEX) 250 mg capsule Take 250 mg by mouth daily. Historical Provider  Active Yes             Med Note (Mauricio Vega. Fri Jan 13, 2017  7:50 PM): Appears to be uti prophylactic dose      cyanocobalamin 1,000 mcg tablet Take 1 Tab by mouth daily. For B12 Nawaf Feliciano NP 1/13/2017 AM Active Yes             Med Note Madi Sanders   Mon Nov 28, 2016  7:57 PM):        fluconazole (DIFLUCAN) 50 mg tablet Take 2 Tabs by mouth daily for 3 days. FDA advises cautious prescribing of oral fluconazole in pregnancy. Indications: CANDIDAL URINARY TRACT INFECTION Gary Henning MD 1/13/2017 AM Active Yes             Med Note (Mauricio Vega. Fri Jan 13, 2017  7:48 PM): x3 days. Started 1/10/17.   Completed this morning      glipiZIDE SR (GLUCOTROL XL) 2.5 mg CR tablet Take 2.5 mg by mouth two (2) times a day. Historical Provider 1/13/2017 AM Active Yes    HYDROcodone-acetaminophen (NORCO) 5-325 mg per tablet Take 1 Tab by mouth every four (4) hours as needed for up to 15 doses. Max Daily Amount: 6 Tabs. Brandee Thurston MD  Active Yes    metoprolol tartrate (LOPRESSOR) 25 mg tablet Take 12.5 mg by mouth two (2) times a day. Historical Provider 1/13/2017 AM Active Yes    multivitamin with iron tablet Take 1 Tab by mouth daily. Historical Provider 1/13/2017 AM Active Yes    pioglitazone (ACTOS) 30 mg tablet Take 30 mg by mouth daily. Historical Provider 1/13/2017 AM Active Yes    sertraline (ZOLOFT) 50 mg tablet take 1 tablet by mouth once daily (BEFORE BREAKFAST) CARRIE Smallwood 1/13/2017 AM Active Yes             Med Note [facility]   Mon Nov 28, 2016  7:57 PM):        tolterodine ER (DETROL-LA) 2 mg ER capsule Take 2 mg by mouth nightly. Historical Provider 1/12/2017 PM Active Yes    trimethoprim-sulfamethoxazole (BACTRIM DS, SEPTRA DS) 160-800 mg per tablet Take 1 Tab by mouth two (2) times a day for 3 days. Indications: BACTERIAL URINARY TRACT INFECTION Gary Henning MD 1/13/2017 AM Active Yes             Med Note (Mauricio Vega. Fri Jan 13, 2017  7:49 PM): x3 days. Started 1/10/17. Thank you,  Miah Rice, Pharm. D.

## 2017-01-14 NOTE — PROGRESS NOTES
Pharmacy note re: Meropenen vs ESBL E Coli UTI    Non-Kinetic Antimicrobial Dosing:   Current Regimen:  Meropenem 500 mg every 6 hrs. Recommendation: For estimated CrCl ~ 41 ml/min (less than 50 ml/hr), change dose to 500 mg q8h    Thank you,    Carla Mendieta.  [facility]

## 2017-01-14 NOTE — ED NOTES
Bedside shift change report given to [address] (oncoming nurse) by Alda Cooley RN (offgoing nurse). Report included the following information SBAR.

## 2017-01-14 NOTE — PROGRESS NOTES
[facility] Dosing Services: Antimicrobial Stewardship Progress Note    Consult for antibiotic dosing of Meropenem by Dr. Humaira Morris  Pharmacist reviewed antibiotic appropriateness for 62year old , female  for indication of UTI  Day of Therapy 1    Plan:    Non-Kinetic Antimicrobial Dosing:   Current Regimen:  Meropenem 500 mg every 6 hrs    Other Antimicrobial  (not dosed by pharmacist)   None   Cultures     1/10: Urine- Ecoli (ESBL)- sen to kip   Serum Creatinine     Lab Results   Component Value Date/Time    Creatinine 1.28 01/10/2017 02:02 PM    Creatinine (POC) 1.6 09/13/2014 03:06 PM       Creatinine Clearance Estimated Creatinine Clearance: 53.7 mL/min (based on Cr of 1.28). Temp   98.2 °F (36.8 °C)    WBC   Lab Results   Component Value Date/Time    WBC 6.9 01/13/2017 07:37 PM       H/H   Lab Results   Component Value Date/Time    HGB 9.1 01/13/2017 07:37 PM        Platelets   Lab Results   Component Value Date/Time    PLATELET 657 69/85/6537 07:37 PM      .  Thank you,  Triston Wells, Pharm. D.

## 2017-01-14 NOTE — ROUTINE PROCESS
Primary Nurse Jennifer Little RN and Regan Skaggs RN performed a dual skin assessment on this patient No impairment noted. Bhupendra score is 23. Bedside and Verbal shift change report given to Regan Skaggs RN (oncoming nurse) by Daria Schwartz (offgoing nurse).  Report included the following information SBAR, Kardex, ED Summary, Intake/Output, MAR and Recent Results

## 2017-01-14 NOTE — ROUTINE PROCESS
TRANSFER - OUT REPORT:    Verbal report given to Sarai RN (name) on Sonny Otero  being transferred to Med Surg (unit) for routine progression of care       Report consisted of patients Situation, Background, Assessment and   Recommendations(SBAR). Information from the following report(s) SBAR, Kardex, ED Summary, Intake/Output, MAR and Recent Results was reviewed with the receiving nurse. Lines:   Peripheral IV 01/13/17 Left Wrist (Active)   Site Assessment Clean, dry, & intact 1/13/2017  8:48 PM   Phlebitis Assessment 0 1/13/2017  8:48 PM   Infiltration Assessment 0 1/13/2017  8:48 PM   Dressing Status Clean, dry, & intact 1/13/2017  8:48 PM   Dressing Type Disk with Chlorhexadine Gluconate (CHG); Tape;Transparent 1/13/2017  8:48 PM   Hub Color/Line Status Pink;Flushed 1/13/2017  8:48 PM   Action Taken Blood drawn 1/13/2017  8:48 PM        Opportunity for questions and clarification was provided.       Patient transported with:   Mamina Shkola

## 2017-01-14 NOTE — CONSULTS
Urology Progress Note    Subjective:     Daily Progress Note: 2017 8:37 AM    Tete Cameron is doing good. She reports pain is well controlled. She has no complaints. She is tolerating a solid diet and ambulating with assistance. Patient is voiding without difficulty. Objective:     Visit Vitals    /57 (BP 1 Location: Right arm, BP Patient Position: At rest)    Pulse 82    Temp 98 °F (36.7 °C)    Resp 18    Ht 4' 8\" (1.422 m)    Wt 72.8 kg (160 lb 7.9 oz)    SpO2 96%    Breastfeeding No    BMI 35.98 kg/m2        Temp (24hrs), Av.2 °F (36.8 °C), Min:98 °F (36.7 °C), Max:98.3 °F (36.8 °C)      Intake and Output:          Physical Exam:   General appearance: alert, cooperative, no distress, appears stated age  Abdomen: soft, non-tender.  Bowel sounds normal. No masses,  no organomegaly, no CVAT         Lab/Data Review:  CMP:   Lab Results   Component Value Date/Time     2017 06:30 AM    K 4.4 2017 06:30 AM     2017 06:30 AM    CO2 27 2017 06:30 AM    AGAP 7 2017 06:30 AM    GLU 62 (L) 2017 06:30 AM    BUN 21 (H) 2017 06:30 AM    CREA 1.69 (H) 2017 06:30 AM    GFRAA 37 (L) 2017 06:30 AM    GFRNA 31 (L) 2017 06:30 AM    CA 8.2 (L) 2017 06:30 AM    ALB 3.1 (L) 2017 07:37 PM    TP 7.5 2017 07:37 PM    GLOB 4.4 (H) 2017 07:37 PM    AGRAT 0.7 (L) 2017 07:37 PM    SGOT 24 2017 07:37 PM    ALT 14 2017 07:37 PM     CBC:   Lab Results   Component Value Date/Time    WBC 5.5 2017 06:30 AM    HGB 9.3 (L) 2017 06:30 AM    HCT 28.9 (L) 2017 06:30 AM     (L) 2017 06:30 AM       Assessment/Plan:     Active Problems:    Hyperlipidemia ()      Mental retardation ()      History of nephrectomy ()      Overview: Right kidney      DM (diabetes mellitus) ([facility] ()      Thrombocytopenia ([facility] (2014)      Coronary artery disease (2014)      Overview: A.  Echo (9/16/14):  EF 45% with mid-distal septal/anterior HK, DD. PASP       37. B. Lexiscan Cardiolite (4/13/15): Normal perfusion, EF 64%.       Ureteral obstruction, left (1/5/2016)      UTI (urinary tract infection) (1/13/2017)      CKD (chronic kidney disease), stage III (1/13/2017)        Plan:  Doing well and continue with treatment  IV abx per ID may need PICC for resistant org 1/10 UCX    Labs and VS stable  Consider ID consult if questions re Tx        Signed By: Rj Grimes MD                         January 14, 2017

## 2017-01-14 NOTE — H&P
[facility]  [address]  [phone]    Admission History and Physical      NAME:  Christ Portillo   :   1955   MRN:  [id number]     PCP:  CARRIE Mcclain     Date/Time:  2017         Subjective:     CHIEF COMPLAINT: sent by urology for management of resistant UTI     HISTORY OF PRESENT ILLNESS:     Ms. Anthony Wallace is a 62 y.o.  female who is admitted with recurrent and complicated UTI. Ms. Anthony Wallace with PMH of DM, CKD, hyperlipidemia, HTN, nephrolithiasis, MR was sent by her urologist for resistant UTI. Pt has Hx of hydronephrosis S/P stent placement in the past and 3 days ago the stent was changed and UA was sent. Her UC result returned with resistant E coli and pt was told to be admitted for IV ABx. Pt denies fever    Past Medical History   Diagnosis Date    Anemia     CKD (chronic kidney disease), stage II      only has one kidney (on right)    Coronary artery disease 2014     A. Echo (14):  EF 45% with mid-distal septal/anterior HK, DD.   PASP 37.    Coronary artery disease      MI 2014    Depression     Diabetes ([address]     DM type 2 causing renal disease ([address]     History of nephrectomy      Right kidney    Hx MRSA infection      had negative swab 2013    Hyperlipidemia     Hypertension     Mental retardation     Nephrolithiasis      hx    Uterine cancer ([address]      hx    UTI (lower urinary tract infection)     Vitamin D deficiency         Past Surgical History   Procedure Laterality Date    Hx orthopaedic       ORIF left arm, hx of fracture    Hx orthopaedic       Right ankle fx repair    Hx cholecystectomy      Hx hysterectomy      Hx dilation and curettage      Hx other surgical  2014     I&D right leg wound    Hx urological  1998     right nephrectomy    Hx urological Left      multiple renal stents       Social History   Substance Use Topics    Smoking status: Never Smoker    Smokeless tobacco: Never Used    Alcohol use No        Family History   Problem Relation Age of Onset    Mental Retardation Mother     Heart Disease Mother     Cancer Sister      breast    Malignant Hyperthermia Neg Hx     Pseudocholinesterase Deficiency Neg Hx     Delayed Awakening Neg Hx     Post-op Nausea/Vomiting Neg Hx     Emergence Delirium Neg Hx     Post-op Cognitive Dysfunction Neg Hx         Allergies   Allergen Reactions    Hydrocodone Nausea Only    Ibuprofen Unknown (comments)     Pt cannot take because of having only one kidney    Penicillins Rash     Tolerates Cephalexin         Prior to Admission medications    Medication Sig Start Date End Date Taking? Authorizing Provider   glipiZIDE SR (GLUCOTROL XL) 2.5 mg CR tablet Take 2.5 mg by mouth two (2) times a day. Yes Historical Provider   metoprolol tartrate (LOPRESSOR) 25 mg tablet Take 12.5 mg by mouth two (2) times a day. Yes Historical Provider   pioglitazone (ACTOS) 30 mg tablet Take 30 mg by mouth daily. Yes Historical Provider   aspirin delayed-release 81 mg tablet Take 81 mg by mouth nightly. Yes Historical Provider   tolterodine ER (DETROL-LA) 2 mg ER capsule Take 2 mg by mouth nightly. Yes Historical Provider   cephALEXin (KEFLEX) 250 mg capsule Take 250 mg by mouth daily. Yes Historical Provider   fluconazole (DIFLUCAN) 50 mg tablet Take 2 Tabs by mouth daily for 3 days. FDA advises cautious prescribing of oral fluconazole in pregnancy. Indications: CANDIDAL URINARY TRACT INFECTION 1/10/17 1/13/17 Yes Orlin Galvan MD   trimethoprim-sulfamethoxazole (BACTRIM DS, SEPTRA DS) 160-800 mg per tablet Take 1 Tab by mouth two (2) times a day for 3 days. Indications: BACTERIAL URINARY TRACT INFECTION 1/10/17 1/13/17 Yes Orlin Galvan MD   HYDROcodone-acetaminophen [facility]) 5-325 mg per tablet Take 1 Tab by mouth every four (4) hours as needed for up to 15 doses. Max Daily Amount: 6 Tabs.  12/1/16  Yes Claudia Kilgore MD multivitamin with iron tablet Take 1 Tab by mouth daily. Yes Historical Provider   atorvastatin (LIPITOR) 10 mg tablet Take 1 Tab by mouth daily. To start this when you run out of crestor 10/17/16  Yes CARRIE Zeng   sertraline (ZOLOFT) 50 mg tablet take 1 tablet by mouth once daily (BEFORE BREAKFAST) 4/27/16  Yes CARRIE Zeng   cyanocobalamin 1,000 mcg tablet Take 1 Tab by mouth daily.  For B12 2/24/14  Yes Gabriela Jain NP         Review of Systems:  (bold if positive, if negative)    Gen:  Eyes:  ENT:  CVS:  Pulm:  GI:    :    MS:  Skin:  Psych:  Endo:    Hem:  Renal:    Neuro:            Objective:      VITALS:    Vital signs reviewed; most recent are:    Visit Vitals    /67 (BP 1 Location: Right arm, BP Patient Position: At rest)    Pulse 97    Temp 98.2 °F (36.8 °C)    Resp 18    Ht 4' 8\" (1.422 m)    Wt 72.8 kg (160 lb 7.9 oz)    SpO2 97%    BMI 35.98 kg/m2     SpO2 Readings from Last 6 Encounters:   01/13/17 97%   01/10/17 100%   12/01/16 96%   11/14/16 100%   06/14/16 100%   06/02/16 96%        No intake or output data in the 24 hours ending 01/13/17 2003         Exam:     Physical Exam:    Gen:  Well-developed, well-nourished, in no acute distress  HEENT:  Pink conjunctivae, PERRL, hearing intact to voice, moist mucous membranes  Neck:  Supple, without masses, thyroid non-tender  Resp:  No accessory muscle use, clear breath sounds without wheezes rales or rhonchi  Card:  No murmurs, normal S1, S2 without thrills, bruits or peripheral edema  Abd:  Soft, non-tender, non-distended, normoactive bowel sounds are present, no palpable organomegaly and no detectable hernias  Lymph:  No cervical or inguinal adenopathy  Musc:  No cyanosis or clubbing  Skin:  No rashes or ulcers, skin turgor is good  Neuro:  Cranial nerves are grossly intact, no focal motor weakness, follows commands appropriately  Psych:  poor insight,         Labs:    Recent Labs      01/13/17 1937   WBC  6.9 HGB  9.1*   HCT  30.2*   PLT  144*     No results for input(s): NA, K, CL, CO2, GLU, BUN, CREA, CA, MG, PHOS, ALB, TBIL, TBILI, SGOT, ALT in the last 72 hours. Lab Results   Component Value Date/Time    Glucose (POC) 135 01/10/2017 12:28 PM    Glucose (POC) 115 12/01/2016 11:04 AM    Glucose (POC) 318 09/13/2014 03:06 PM    Glucose (POC) 169 01/27/2010 07:23 PM    Glucose  nonfasting 09/26/2016 02:01 PM    Glucose POC 80 non fasting 04/25/2016 01:18 PM     No results for input(s): PH, PCO2, PO2, HCO3, FIO2 in the last 72 hours. No results for input(s): INR in the last 72 hours. No lab exists for component: INREXT    Telemetry reviewed:          Assessment/Plan:    1. Complicated UTI (urinary tract infection) (1/13/2017) secondary to stent. According to the report from urology office, the organism is E coli sensitive to cefepime and meropenem only. She was on bactrim empirically until today. Continue meropenem which has been started in ER already. 2.  DM (diabetes mellitus) ([facility] with kidney complication. Continue home glipizide and cover with SSI     3.  CKD (chronic kidney disease), stage III (1/13/2017). Continue to monitor renal function. 4.   Hyperlipidemia. On statin     5. Ureteral obstruction, left (1/5/2016) s/p stent/ Hx of RT nephrectomy. Consult urology     6. Mental retardation. Mild. Supportive care     7. Thrombocytopenia ([facility] [address] (6/23/2014). This is mild and chronic. 8.  Coronary artery disease (9/17/2014). Continue ASA      Overview: A.  Echo (9/16/14):  EF 45% with mid-distal septal/anterior HK, DD. PASP       37. B. Lexiscan Cardiolite (4/13/15): Normal perfusion, EF 64%.           Previous medical records reviewed     Risk of deterioration: high      Total time spent with patient: [address] [place] [id number] discussed with: Patient, Family, Nursing Staff and >50% of time spent in counseling and coordination of care    Discussed:  Care Plan    Prophylaxis:  Hep SQ    Probable Disposition:  Home w/Family           ___________________________________________________    Attending Physician: Pierre Hurtado MD

## 2017-01-15 LAB
ANION GAP BLD CALC-SCNC: 9 MMOL/L (ref 5–15)
BACTERIA SPEC CULT: NORMAL
BUN SERPL-MCNC: 27 MG/DL (ref 6–20)
BUN/CREAT SERPL: 16 (ref 12–20)
CALCIUM SERPL-MCNC: 8.1 MG/DL (ref 8.5–10.1)
CC UR VC: NORMAL
CHLORIDE SERPL-SCNC: 109 MMOL/L (ref 97–108)
CO2 SERPL-SCNC: 22 MMOL/L (ref 21–32)
CREAT SERPL-MCNC: 1.67 MG/DL (ref 0.55–1.02)
ERYTHROCYTE [DISTWIDTH] IN BLOOD BY AUTOMATED COUNT: 16.8 % (ref 11.5–14.5)
GLUCOSE BLD STRIP.AUTO-MCNC: 101 MG/DL (ref 65–100)
GLUCOSE BLD STRIP.AUTO-MCNC: 133 MG/DL (ref 65–100)
GLUCOSE BLD STRIP.AUTO-MCNC: 152 MG/DL (ref 65–100)
GLUCOSE BLD STRIP.AUTO-MCNC: 70 MG/DL (ref 65–100)
GLUCOSE BLD STRIP.AUTO-MCNC: 78 MG/DL (ref 65–100)
GLUCOSE BLD STRIP.AUTO-MCNC: 82 MG/DL (ref 65–100)
GLUCOSE SERPL-MCNC: 61 MG/DL (ref 65–100)
HCT VFR BLD AUTO: 28.5 % (ref 35–47)
HGB BLD-MCNC: 8.9 G/DL (ref 11.5–16)
MAGNESIUM SERPL-MCNC: 1.6 MG/DL (ref 1.6–2.4)
MCH RBC QN AUTO: 27.8 PG (ref 26–34)
MCHC RBC AUTO-ENTMCNC: 31.2 G/DL (ref 30–36.5)
MCV RBC AUTO: 89.1 FL (ref 80–99)
PHOSPHATE SERPL-MCNC: 3.2 MG/DL (ref 2.6–4.7)
PLATELET # BLD AUTO: 148 K/UL (ref 150–400)
POTASSIUM SERPL-SCNC: 4.9 MMOL/L (ref 3.5–5.1)
RBC # BLD AUTO: 3.2 M/UL (ref 3.8–5.2)
SERVICE CMNT-IMP: ABNORMAL
SERVICE CMNT-IMP: NORMAL
SODIUM SERPL-SCNC: 140 MMOL/L (ref 136–145)
WBC # BLD AUTO: 5.2 K/UL (ref 3.6–11)

## 2017-01-15 PROCEDURE — 74011250636 HC RX REV CODE- 250/636: Performed by: INTERNAL MEDICINE

## 2017-01-15 PROCEDURE — 36415 COLL VENOUS BLD VENIPUNCTURE: CPT | Performed by: INTERNAL MEDICINE

## 2017-01-15 PROCEDURE — 83735 ASSAY OF MAGNESIUM: CPT | Performed by: INTERNAL MEDICINE

## 2017-01-15 PROCEDURE — 74011250637 HC RX REV CODE- 250/637: Performed by: INTERNAL MEDICINE

## 2017-01-15 PROCEDURE — 85027 COMPLETE CBC AUTOMATED: CPT | Performed by: INTERNAL MEDICINE

## 2017-01-15 PROCEDURE — 65270000029 HC RM PRIVATE

## 2017-01-15 PROCEDURE — 84100 ASSAY OF PHOSPHORUS: CPT | Performed by: INTERNAL MEDICINE

## 2017-01-15 PROCEDURE — 80048 BASIC METABOLIC PNL TOTAL CA: CPT | Performed by: INTERNAL MEDICINE

## 2017-01-15 PROCEDURE — 82962 GLUCOSE BLOOD TEST: CPT

## 2017-01-15 PROCEDURE — 74011000258 HC RX REV CODE- 258: Performed by: INTERNAL MEDICINE

## 2017-01-15 RX ORDER — GLIPIZIDE 2.5 MG/1
2.5 TABLET, EXTENDED RELEASE ORAL
Status: DISCONTINUED | OUTPATIENT
Start: 2017-01-15 | End: 2017-01-16 | Stop reason: HOSPADM

## 2017-01-15 RX ORDER — INSULIN LISPRO 100 [IU]/ML
INJECTION, SOLUTION INTRAVENOUS; SUBCUTANEOUS
Status: DISCONTINUED | OUTPATIENT
Start: 2017-01-15 | End: 2017-01-16 | Stop reason: HOSPADM

## 2017-01-15 RX ADMIN — HEPARIN SODIUM 5000 UNITS: 5000 INJECTION, SOLUTION INTRAVENOUS; SUBCUTANEOUS at 14:58

## 2017-01-15 RX ADMIN — HEPARIN SODIUM 5000 UNITS: 5000 INJECTION, SOLUTION INTRAVENOUS; SUBCUTANEOUS at 22:17

## 2017-01-15 RX ADMIN — SERTRALINE 50 MG: 50 TABLET, FILM COATED ORAL at 09:21

## 2017-01-15 RX ADMIN — MEROPENEM 500 MG: 500 INJECTION, POWDER, FOR SOLUTION INTRAVENOUS at 17:33

## 2017-01-15 RX ADMIN — Medication 10 ML: at 05:36

## 2017-01-15 RX ADMIN — ATORVASTATIN CALCIUM 10 MG: 10 TABLET, FILM COATED ORAL at 09:20

## 2017-01-15 RX ADMIN — Medication 1 TABLET: at 09:21

## 2017-01-15 RX ADMIN — Medication 1000 MCG: at 09:21

## 2017-01-15 RX ADMIN — Medication 10 ML: at 22:18

## 2017-01-15 RX ADMIN — HEPARIN SODIUM 5000 UNITS: 5000 INJECTION, SOLUTION INTRAVENOUS; SUBCUTANEOUS at 05:35

## 2017-01-15 RX ADMIN — MEROPENEM 500 MG: 500 INJECTION, POWDER, FOR SOLUTION INTRAVENOUS at 09:20

## 2017-01-15 RX ADMIN — ASPIRIN 81 MG: 81 TABLET, COATED ORAL at 22:17

## 2017-01-15 RX ADMIN — PIOGLITAZONE 30 MG: 15 TABLET ORAL at 09:20

## 2017-01-15 RX ADMIN — SODIUM CHLORIDE 75 ML/HR: 900 INJECTION, SOLUTION INTRAVENOUS at 05:36

## 2017-01-15 RX ADMIN — METOPROLOL TARTRATE 12.5 MG: 25 TABLET ORAL at 09:21

## 2017-01-15 RX ADMIN — TOLTERODINE TARTRATE 2 MG: 2 CAPSULE, EXTENDED RELEASE ORAL at 22:17

## 2017-01-15 RX ADMIN — MEROPENEM 500 MG: 500 INJECTION, POWDER, FOR SOLUTION INTRAVENOUS at 23:39

## 2017-01-15 NOTE — PROGRESS NOTES
Pt feels better  No pain or fever    AF VSS  UOP clear  CX 1/13 negative urine, BCX pending    Home with PO or IV ABX  FU with Samuel Hernandez addl Gu input  Signing off

## 2017-01-15 NOTE — PROGRESS NOTES
[facility] [address]  [address]  [phone]      Medical Progress Note      NAME: Yvrose Rahman   :  1955  MRM:  [id number]    Date/Time: 1/15/2017         Subjective:     Chief Complaint:  Patient was seen and examined by me. Chart reviewed. Doing well, anxious to go home       Objective:       Vitals:       Last 24hrs VS reviewed since prior progress note. Most recent are:    Visit Vitals    /64 (BP 1 Location: Right arm, BP Patient Position: At rest)    Pulse 85    Temp 98.2 °F (36.8 °C)    Resp 18    Ht 4' 8\" (1.422 m)    Wt 72.8 kg (160 lb 7.9 oz)    SpO2 96%    Breastfeeding No    BMI 35.98 kg/m2     SpO2 Readings from Last 6 Encounters:   01/15/17 96%   01/10/17 100%   16 96%   16 100%   16 100%   16 96%        No intake or output data in the 24 hours ending 01/15/17 1147     Exam:     Physical Exam:    Gen:  Well-developed, well-nourished, in no acute distress, pleasant  HEENT:  Pink conjunctivae, PERRL, hearing intact to voice, moist mucous membranes  Neck:  Supple, without masses, thyroid non-tender  Resp:  No accessory muscle use, clear breath sounds without wheezes rales or rhonchi  Card:  No murmurs, normal S1, S2 without thrills, bruits or peripheral edema  Abd:  Soft, non-tender, non-distended, normoactive bowel sounds are present  Musc:  No cyanosis or clubbing  Skin:  No rashes   Neuro:  Cranial nerves 3-12 are grossly intact, follows commands appropriately, baseline dysarthria  Psych:  poor insight, oriented to person.   Mental retardation    Medications Reviewed: (see below)    Lab Data Reviewed: (see below)    ______________________________________________________________________    Medications:     Current Facility-Administered Medications   Medication Dose Route Frequency    insulin lispro (HUMALOG) injection   SubCUTAneous TIDAC    glipiZIDE SR (GLUCOTROL XL) tablet 2.5 mg  2.5 mg Oral ACB    meropenem (MERREM) 500 mg in 0.9% sodium chloride (MBP/ADV) 50 mL  500 mg IntraVENous Q8H    aspirin delayed-release tablet 81 mg  81 mg Oral QHS    atorvastatin (LIPITOR) tablet 10 mg  10 mg Oral DAILY    cyanocobalamin (VITAMIN B12) tablet 1,000 mcg  1,000 mcg Oral DAILY    HYDROcodone-acetaminophen (NORCO) 5-325 mg per tablet 1 Tab  1 Tab Oral Q4H PRN    metoprolol tartrate (LOPRESSOR) tablet 12.5 mg  12.5 mg Oral BID    multivitamin with iron tablet 1 Tab  1 Tab Oral DAILY    pioglitazone (ACTOS) tablet 30 mg  30 mg Oral DAILY    sertraline (ZOLOFT) tablet 50 mg  50 mg Oral DAILY    tolterodine ER (DETROL-LA) capsule 2 mg  2 mg Oral QHS    sodium chloride (NS) flush 5-10 mL  5-10 mL IntraVENous Q8H    sodium chloride (NS) flush 5-10 mL  5-10 mL IntraVENous PRN    heparin (porcine) injection 5,000 Units  5,000 Units SubCUTAneous Q8H    glucose chewable tablet 16 g  4 Tab Oral PRN    dextrose (D50W) injection syrg 12.5-25 g  12.5-25 g IntraVENous PRN    glucagon (GLUCAGEN) injection 1 mg  1 mg IntraMUSCular PRN    0.9% sodium chloride infusion  75 mL/hr IntraVENous CONTINUOUS          Lab Review:     Recent Labs      01/15/17   0333  01/14/17   0630  01/13/17 1937   WBC  5.2  5.5  6.9   HGB  8.9*  9.3*  9.1*   HCT  28.5*  28.9*  30.2*   PLT  148*  140*  144*     Recent Labs      01/15/17   0333  01/14/17   0630  01/13/17 1937   NA  140  140  136   K  4.9  4.4  4.7   CL  109*  106  103   CO2  22  27  24   GLU  61*  62*  81   BUN  27*  21*  19   CREA  1.67*  1.69*  1.60*   CA  8.1*  8.2*  8.3*   MG  1.6   --    --    PHOS  3.2   --    --    ALB   --    --   3.1*   TBILI   --    --   0.2   SGOT   --    --   24   ALT   --    --   14     Lab Results   Component Value Date/Time    Glucose (POC) 78 01/15/2017 11:06 AM    Glucose (POC) 82 01/15/2017 07:59 AM    Glucose (POC) 70 01/15/2017 07:31 AM    Glucose (POC) 165 01/14/2017 08:32 PM    Glucose (POC) 90 01/14/2017 05:00 PM    Glucose (POC) 318 09/13/2014 03:06 PM    Glucose (POC) 169 01/27/2010 07:23 PM    Glucose (POC) 153 10/11/2009 08:37 PM    Glucose  nonfasting 09/26/2016 02:01 PM    Glucose POC 80 non fasting 04/25/2016 01:18 PM    Glucose POC 91 nonfasting 10/26/2015 12:53 PM    Glucose  non fasting 08/24/2015 12:38 PM    Glucose  not fasting 06/22/2015 01:22 PM          Assessment / Plan: Active Problems:    61 yo hx of mental retardation, R nephrectomy, recent L ureteral stent, DM, CKD 3, presented w/ complicated UTI, ESBL E. Coli    1) Complicated UTI/ESBL infection: not septic. Recurrent UTI due to nephrolithiasis and stents. Has solitary L kidney. UCx on 01/10 with ESBL E. Coli. This could also be colonization since patient has no leukocytosis or fever. Repeat UCx on 01/13 neg. Will cont meropenem. Will complete 3 days of IV Abx and d/c home tomorrow w/o additional abx    2) DM type 2 w/ renal complications: K4D 2.7%. Issues with AM hypoglycemia. Will d/c night time glipizide. Cont AM glipizide only    3) Solitary L kidney/chronic ureteral obstruction: s/p stent. Urology following    4) CKD 3: Cr at baseline. Monitor     5) CAD: cont ASA    6) Mental retardation: has chronic dysarthria. Poor insight. Care taker is Pepper Contreras, [id number].   [facility], [phone]    Total time spent with patient: [facility] [address] discussed with: Patient, nursing, care taker     Discussed:  Care Plan    Prophylaxis:  Hep SQ    Disposition:  Home w/Family           ___________________________________________________    Attending Physician: Luda Boss MD

## 2017-01-15 NOTE — PROGRESS NOTES
Bedside and Verbal shift change report given to Peter RN (oncoming nurse) by Blanquita Prabhakar RN (offgoing nurse). Report included the following information SBAR, Kardex, Intake/Output, MAR, Accordion and Recent Results.

## 2017-01-15 NOTE — PROGRESS NOTES
Bedside and Verbal shift change report given to Chalino Vásquez RN (oncoming nurse) by Chaitanya Aldridge RN (offgoing nurse). Report included the following information SBAR, Kardex, ED Summary, Intake/Output, MAR and Recent Results.

## 2017-01-16 VITALS
HEIGHT: 56 IN | SYSTOLIC BLOOD PRESSURE: 135 MMHG | BODY MASS INDEX: 36.1 KG/M2 | RESPIRATION RATE: 16 BRPM | OXYGEN SATURATION: 98 % | HEART RATE: 75 BPM | TEMPERATURE: 98.1 F | WEIGHT: 160.5 LBS | DIASTOLIC BLOOD PRESSURE: 61 MMHG

## 2017-01-16 LAB
GLUCOSE BLD STRIP.AUTO-MCNC: 103 MG/DL (ref 65–100)
GLUCOSE BLD STRIP.AUTO-MCNC: 125 MG/DL (ref 65–100)
SERVICE CMNT-IMP: ABNORMAL
SERVICE CMNT-IMP: ABNORMAL

## 2017-01-16 PROCEDURE — 74011250637 HC RX REV CODE- 250/637: Performed by: INTERNAL MEDICINE

## 2017-01-16 PROCEDURE — 74011250636 HC RX REV CODE- 250/636: Performed by: INTERNAL MEDICINE

## 2017-01-16 PROCEDURE — 74011000258 HC RX REV CODE- 258: Performed by: INTERNAL MEDICINE

## 2017-01-16 PROCEDURE — 82962 GLUCOSE BLOOD TEST: CPT

## 2017-01-16 RX ORDER — GLIPIZIDE 2.5 MG/1
2.5 TABLET, EXTENDED RELEASE ORAL
Qty: 30 TAB | Refills: 0 | Status: SHIPPED
Start: 2017-01-16 | End: 2017-09-06 | Stop reason: SDUPTHER

## 2017-01-16 RX ADMIN — GLIPIZIDE 2.5 MG: 2.5 TABLET, FILM COATED, EXTENDED RELEASE ORAL at 08:40

## 2017-01-16 RX ADMIN — ATORVASTATIN CALCIUM 10 MG: 10 TABLET, FILM COATED ORAL at 08:40

## 2017-01-16 RX ADMIN — Medication 10 ML: at 06:44

## 2017-01-16 RX ADMIN — Medication 1000 MCG: at 08:40

## 2017-01-16 RX ADMIN — PIOGLITAZONE 30 MG: 15 TABLET ORAL at 08:40

## 2017-01-16 RX ADMIN — HEPARIN SODIUM 5000 UNITS: 5000 INJECTION, SOLUTION INTRAVENOUS; SUBCUTANEOUS at 06:44

## 2017-01-16 RX ADMIN — SERTRALINE 50 MG: 50 TABLET, FILM COATED ORAL at 08:40

## 2017-01-16 RX ADMIN — MEROPENEM 500 MG: 500 INJECTION, POWDER, FOR SOLUTION INTRAVENOUS at 08:40

## 2017-01-16 RX ADMIN — Medication 1 TABLET: at 08:40

## 2017-01-16 RX ADMIN — METOPROLOL TARTRATE 12.5 MG: 25 TABLET ORAL at 08:40

## 2017-01-16 NOTE — DISCHARGE INSTRUCTIONS
HOSPITALIST DISCHARGE INSTRUCTIONS  NAME: Maikel Israel   :  1955   MRN:  [id number]     Date/Time:  2017 11:47 AM    ADMIT DATE: 2017     DISCHARGE DATE: 2017     ADMITTING DIAGNOSIS:  Urinary tract infection. History of ESBL infection    DISCHARGE DIAGNOSIS:  same    MEDICATIONS:  See after visit summary       · It is important that you take the medication exactly as they are prescribed. · Keep your medication in the bottles provided by the pharmacist and keep a list of the medication names, dosages, and times to be taken in your wallet. · Do not take other medications without consulting your doctor     Pain Management: per above medications    What to do at Home    Recommended diet:  Diabetic Diet    Recommended activity: Activity as tolerated    1) Return to the hospital if you feel worse    2) If you experience any of the following symptoms then please call your primary care physician or return to the emergency room if you cannot get hold of your doctor:  Fever, chills, nausea, vomiting, diarrhea, change in mentation, falling, bleeding, shortness of breath, chest pain, severe headache, severe abdominal pain,     3) please follow up with your doctors as instructed    4) Only take your glipizide in the morning. Stop taking it in the evening due to low blood sugar    Follow Up: Follow-up Information     Follow up With Details Comments CARRIE Persaud Schedule an appointment as soon as possible for a visit in 1 week  [address] Fahad Montgomery MD Schedule an appointment as soon as possible for a visit in 1 week  [address] [facility]  [phone]              Information obtained by :  I understand that if any problems occur once I am at home I am to contact my physician. I understand and acknowledge receipt of the instructions indicated above. Physician's or R.N.'s Signature                                                                  Date/Time                                                                                                                                              Patient or Representative Signature                                                          Date/Time         Learning About ESBL Infection  What is an ESBL infection? ESBL stands for extended spectrum beta-lactamase. It's an enzyme found in some strains of bacteria. ESBL-producing bacteria can't be killed by many of the antibiotics that doctors use to treat infections, like penicillins and some cephalosporins. This makes it harder to treat. An infection with ESBL germs can be in any part of the body, including blood, organs, skin, and sites where surgery was done. There are many ways ESBL germs can be spread. The most common ways are by touching a person or thing that has the bacteria on it. The infection is more likely to spread in a hospital. For some people, especially those who are weak or ill, an ESBL infection can be serious. What are the symptoms? Symptoms of an ESBL infection are similar to other infections. Which symptoms you have will depend on where the infection is. For example:  · If the infection is in the skin, that area may be red or tender. · If it's in the urinary tract, you may have back pain, a burning sensation when you urinate, or a need to urinate more often than usual.  · If it's in the lungs, you may have a cough and trouble breathing. You may also:  · Have diarrhea. · Feel weak and sick. · Have fever and chills. How is an ESBL infection treated? Your doctor will send a sample of your infected skin tissue, blood, or urine to a lab. The lab will grow the germs from the sample. Then they will test them to see which antibiotics can kill them.   To treat the infection, your doctor will give you antibiotics. Take your antibiotics as directed. Do not stop taking them just because you feel better. You need to take the full course of antibiotics. Taking only some of the medicine may cause antibiotic-resistant bacteria to develop. You may have to stay in the hospital for treatment. How can you prevent the spread of an ESBL infection? If you have an ESBL infection in the hospital:  · Your doctor may want to keep you away from others to reduce the chances of spreading the bacteria. You may be in a special room, called an isolation room. Visitors may be limited to prevent ESBL germs from being carried outside your room. Children, pregnant women, people who are ill, and some others might not be allowed into the room. That's because they can be more likely to get a serious infection. · Everyone who comes in the room will need to wash their hands with soap and water or use an alcohol-based hand . Clean hands help stop the germs from spreading. · Visitors and caregivers may have to use disposable gloves and a gown over their clothes. This helps prevent ESBL germs from spreading. Practice good hygiene  · Wash your hands often. Hand-washing is the best way to avoid spreading germs. When washing:  [facility] your hands thoroughly with soap and clean, running water. ¨ Rub your hands together for at least 20 seconds. ¨ Pay special attention to your wrists, the backs of your hands, between your fingers, and under your fingernails. ¨ Don't touch the faucet with your hand. Use a paper towel to turn off the water. ¨ You can also use an alcohol-based hand . If you use , rub your hands and fingers until they are dry. You don't need to use water. The alcohol quickly kills many types of germs on your hands. · If you're in the hospital, ask everyone to wash their hands before they come in the room. · Keep cuts and scrapes clean and covered with a bandage.  Wash your hands after you touch elastic bandages or other dressings over a wound. This can keep bacteria from spreading. Wrap bandages in a plastic bag before you throw them away. Avoid contact with other people's wounds or bandages. · Do not share towels, washcloths, razors, clothing, or other items that touched your wound or bandage. Wash your sheets, towels, and clothes with warm water and detergent. Dry them in a hot dryer, if you can. · Keep shared areas clean by wiping down surfaces (such as counters, doorknobs, and light switches) with a disinfectant. Follow-up care is a key part of your treatment and safety. Be sure to make and go to all appointments, and call your doctor if you are having problems. It's also a good idea to know your test results and keep a list of the medicines you take. Where can you learn more? Go to http://antoniUCOPIA Communicationsjacqui.info/. Enter P152 in the search box to learn more about \"Learning About ESBL Infection. \"  Current as of: July 27, 2016  Content Version: 11.1  © 6562-0274 [employer]. Care instructions adapted under license by [employer] (which disclaims liability or warranty for this information). If you have questions about a medical condition or this instruction, always ask your healthcare professional. [place] [address] any warranty or liability for your use of this information. Urinary Tract Infection in Women: Care Instructions  Your Care Instructions    A urinary tract infection, or UTI, is a general term for an infection anywhere between the kidneys and the urethra (where urine comes out). Most UTIs are bladder infections. They often cause pain or burning when you urinate. UTIs are caused by bacteria and can be cured with antibiotics. Be sure to complete your treatment so that the infection goes away. Follow-up care is a key part of your treatment and safety.  Be sure to make and go to all appointments, and call your doctor if you are having problems. It's also a good idea to know your test results and keep a list of the medicines you take. How can you care for yourself at home? · Take your antibiotics as directed. Do not stop taking them just because you feel better. You need to take the full course of antibiotics. · Drink extra water and other fluids for the next day or two. This may help wash out the bacteria that are causing the infection. (If you have kidney, heart, or liver disease and have to limit fluids, talk with your doctor before you increase your fluid intake.)  · Avoid drinks that are carbonated or have caffeine. They can irritate the bladder. · Urinate often. Try to empty your bladder each time. · To relieve pain, take a hot bath or lay a heating pad set on low over your lower belly or genital area. Never go to sleep with a heating pad in place. To prevent UTIs  · Drink plenty of water each day. This helps you urinate often, which clears bacteria from your system. (If you have kidney, heart, or liver disease and have to limit fluids, talk with your doctor before you increase your fluid intake.)  · Consider adding cranberry juice to your diet. · Urinate when you need to. · Urinate right after you have sex. · Change sanitary pads often. · Avoid douches, bubble baths, feminine hygiene sprays, and other feminine hygiene products that have deodorants. · After going to the bathroom, wipe from front to back. When should you call for help? Call your doctor now or seek immediate medical care if:  · Symptoms such as fever, chills, nausea, or vomiting get worse or appear for the first time. · You have new pain in your back just below your rib cage. This is called flank pain. · There is new blood or pus in your urine. · You have any problems with your antibiotic medicine.   Watch closely for changes in your health, and be sure to contact your doctor if:  · You are not getting better after taking an antibiotic for 2 days. · Your symptoms go away but then come back. Where can you learn more? Go to http://antoni-jacqui.info/. Enter I730 in the search box to learn more about \"Urinary Tract Infection in Women: Care Instructions. \"  Current as of: August 12, 2016  Content Version: 11.1  © 6077-3834 [employer]. Care instructions adapted under license by [employer] (which disclaims liability or warranty for this information). If you have questions about a medical condition or this instruction, always ask your healthcare professional. [address] any warranty or liability for your use of this information.

## 2017-01-16 NOTE — DISCHARGE SUMMARY
Toi Sun riya [place] [address]  [address]  [phone]    Physician Discharge Summary     Patient ID:  Garth Bear  [id number]  17 y.o.  1955    Admit date: 1/13/2017    Discharge date and time: 1/16/2017    Admission Diagnoses: UTI (urinary tract infection)  UTI (urinary tract infection)    Discharge Diagnoses:  Principal Diagnosis <principal problem not specified>                                            Active Problems:    Hyperlipidemia ()      Mental retardation ()      History of nephrectomy ()      Overview: Right kidney      DM (diabetes mellitus) ([facility] ()      Thrombocytopenia ([facility] (6/23/2014)      Coronary artery disease (9/17/2014)      Overview: A.  Echo (9/16/14):  EF 45% with mid-distal septal/anterior HK, DD. PASP       37. B. Lexiscan Cardiolite (4/13/15): Normal perfusion, EF 64%. Ureteral obstruction, left (1/5/2016)      UTI (urinary tract infection) (1/13/2017)      CKD (chronic kidney disease), stage III (1/13/2017)           Hospital Course:     63 yo hx of mental retardation, R nephrectomy, recent L ureteral stent, DM, CKD 3, presented w/ complicated UTI, ESBL E. Coli     1) Complicated UTI/ESBL infection: not septic. Recurrent UTI due to nephrolithiasis and stents. Has solitary L kidney. UCx on 01/10 with ESBL E. Coli. This could also be colonization since patient has no leukocytosis or fever. Repeat UCx on 01/13 neg. Patient received 4 days of IV meropenem. No additional abx is necessary. Avoid giving unnecessary abx in the future since it will breed more resistant orgainism     2) DM type 2 w/ renal complications: V0G 8.6%. Issues with AM hypoglycemia. Will d/c night time glipizide. Cont AM glipizide only     3) Solitary L kidney/chronic ureteral obstruction: s/p stent. Urology following. F/u with Dr. You Vaughn     4) CKD 3: Cr at baseline.  Monitor      5) CAD: cont ASA     6) Mental retardation: has chronic dysarthria. Poor insight. Care taker is Joseph Washington, [id number]. [phone], [phone]    PCP: CARRIE Fall     Consults: Urology    Significant Diagnostic Studies: urine culture    Discharge Exam:  Physical Exam:    Gen: Well-developed, well-nourished, obese, in no acute distress  HEENT:  Pink conjunctivae, PERRL, hearing intact to voice, moist mucous membranes  Neck: Supple, without masses, thyroid non-tender  Resp: No accessory muscle use, clear breath sounds without wheezes rales or rhonchi  Card: No murmurs, normal S1, S2 without thrills, bruits or peripheral edema  Abd:  Soft, non-tender, non-distended, normoactive bowel sounds are present, no palpable organomegaly and no detectable hernias  Lymph:  No cervical or inguinal adenopathy  Musc: No cyanosis or clubbing  Skin: No rashes  Neuro:  Cranial nerves are grossly intact, no focal motor weakness, follows commands appropriately, chronic dysarthria  Psych:  poor insight, oriented to person, place. Baseline mental retardation    Disposition: home  Discharge Condition: Stable    Patient Instructions:   Current Discharge Medication List      CONTINUE these medications which have NOT CHANGED    Details   glipiZIDE SR (GLUCOTROL XL) 2.5 mg CR tablet Take 2.5 mg by mouth in AM only      metoprolol tartrate (LOPRESSOR) 25 mg tablet Take 12.5 mg by mouth two (2) times a day. pioglitazone (ACTOS) 30 mg tablet Take 30 mg by mouth daily. aspirin delayed-release 81 mg tablet Take 81 mg by mouth nightly. tolterodine ER (DETROL-LA) 2 mg ER capsule Take 2 mg by mouth nightly. HYDROcodone-acetaminophen (NORCO) 5-325 mg per tablet Take 1 Tab by mouth every four (4) hours as needed for up to 15 doses. Max Daily Amount: 6 Tabs. Qty: 15 Tab, Refills: 0      multivitamin with iron tablet Take 1 Tab by mouth daily. atorvastatin (LIPITOR) 10 mg tablet Take 1 Tab by mouth daily.  To start this when you run out of crestor  Qty: 90 Tab, Refills: 1    Associated Diagnoses: High cholesterol      sertraline (ZOLOFT) 50 mg tablet take 1 tablet by mouth once daily (BEFORE BREAKFAST)  Qty: 90 Tab, Refills: 3      cyanocobalamin 1,000 mcg tablet Take 1 Tab by mouth daily. For B12  Qty: 180 Tab, Refills: 1    Associated Diagnoses: DM (diabetes mellitus) ([facility]  Acquired solitary kidney         STOP taking these medications       cephALEXin (KEFLEX) 250 mg capsule Comments:   Reason for Stopping:         fluconazole (DIFLUCAN) 50 mg tablet Comments:   Reason for Stopping:         trimethoprim-sulfamethoxazole (BACTRIM DS, SEPTRA DS) 160-800 mg per tablet Comments:   Reason for Stopping:             Activity: Activity as tolerated  Diet: Diabetic Diet  Wound Care: None needed    Follow-up with  Follow-up Information     Follow up With Details Comments CARRIE Persaud Schedule an appointment as soon as possible for a visit in 1 week  [address] Finn Figueroa MD Schedule an appointment as soon as possible for a visit in 1 week  [address] [place] [address] [phone]            Follow-up tests/labs: none    Signed:  Destiny Cochran MD  1/16/2017  11:49 AM    I spent 34 min on discharge

## 2017-01-16 NOTE — INTERDISCIPLINARY ROUNDS
Interdisciplinary team rounds were held 1/16/2017 with the following team members:Care Management, Nursing, Nutrition, Pharmacy, Physical Therapy, Physician and Clinical Coordinator. Plan of care discussed. See clinical pathway and/or care plan for interventions and desired outcomes.     Anticipated discharge:  today

## 2017-01-16 NOTE — PROGRESS NOTES
Bedside and Verbal shift change report given to Carolina Gómez RN (oncoming nurse) by David Brenner RN (offgoing nurse). Report included the following information SBAR, Kardex, Intake/Output, MAR, Accordion, Recent Results and Med Rec Status.

## 2017-01-16 NOTE — PROGRESS NOTES
CM Note:  Met with pt for d/c planning. PTA pt was independent with ADL's, was ambulatory. She does not drive. She relies on her caregiver, Cipriano Perez, for transport. No DME at home. She has never had home health. Her emergency contact is her caregiver, Cipriano Perez ([phone]), who will drive her home at d/c. Her POA is Jeri Lopez ([phone]). Pt [facility] her [facility]. She has a [employer]. Will continue to follow and assist with any needs prior to d/c. JULIO C Restrepo RN    Care Management Interventions  PCP Verified by CM: Yes (PCP is CARRIE Landrum.)  Current Support Network: Lives with Spouse (Pt lives with her  in a 2 story house. MBR on main floor. .  Her caregiver, Cipriano Perez lives on the property.)  Confirm Follow Up Transport: Other (see comment) (Caregiver, Cipriano Perez, to drive her home at d/c.)  Discharge Location  Discharge Placement: Home with two level

## 2017-01-16 NOTE — PROGRESS NOTES
Unable to obtain labs this morning despite several attempts from phlebotomist and Writer. Dr. Marissa Davila made aware.

## 2017-01-16 NOTE — PROGRESS NOTES
Patient and caregiver Pj Wilkerson) given instructions and signed copy in chart. Time for questions and clarification was provided. PIV removed and tip was intact. Patient in no current distress and wheeled out by PCT.

## 2017-01-17 ENCOUNTER — PATIENT OUTREACH (OUTPATIENT)
Dept: FAMILY MEDICINE CLINIC | Age: 62
End: 2017-01-17

## 2017-01-17 NOTE — PROGRESS NOTES
Transition of Care Note- in patient encounter Dx UTI    RRAT score:  Low  Recent Labs      01/15/17   0333   NA  140   K  4.9   CL  109*   CO2  22   GLU  61*   BUN  27*   CREA  1.67*   CA  8.1*   MG  1.6   PHOS  3.2     No components found for: GLPOC  No results for input(s): PH, PCO2, PO2, HCO3, FIO2 in the last 72 hours. No results for input(s): INR in the last 72 hours. No lab exists for component: INREXT    Language spoken:      [place]. Advance Care Planning:   Patient was offered the opportunity to discuss advance care planning:  no     Does patient have an Advance Directive:  no   If no, did you provide information on Caring Connections?  no     PCP/Specialist follow up: Patient scheduled to follow up with Mamie Darling NP on !/26/17. Reviewed red flags with patient, and patient verbalizes understanding. Patient given an opportunity to ask questions. No other clinical/social/functional needs noted. The patient agrees to contact the PCP office for questions related to their healthcare. The patient expressed thanks, offered no additional questions and ended the call. Medication:   Performed medication reconciliation with patient, and patient verbalizes understanding of administration of home medications. There were no barriers to obtaining medications identified at this time. EDUCATION:  NN contact information and hours with hours of operation. Care giver Ms. Dangelo states that Yasemin Duff is doing well, denies pain, and is aware FU appointment. Support system:  patient and guardian    Discharge Instructions :  Reviewed discharge instructions with patient. Patient verbalizes understanding of discharge instructions and follow-up care. Red Flags:  Post op UTI.     Labs Reviewed:  Recent Labs      01/15/17   0333   WBC  5.2   HGB  8.9*   HCT  28.5*   PLT  148*         Medical History:     Past Medical History   Diagnosis Date    Anemia     CKD (chronic kidney disease), stage II only has one kidney (on right)    Coronary artery disease 2014     A. Echo (14):  EF 45% with mid-distal septal/anterior HK, DD. PASP 37.    Coronary artery disease      MI 2014    Depression     [facility]     DM type 2 causing renal disease ([facility]     History of nephrectomy      Right kidney    Hx MRSA infection      had negative swab 2013    Hyperlipidemia     Hypertension     Mental retardation     Nephrolithiasis      hx    Uterine cancer ([facility]      hx    UTI (lower urinary tract infection)     Vitamin D deficiency            Nurse Navigator(NN) contacted the patient by telephone to perform post hospital discharge assessment. Verified  and address with patient as identifiers. Provided introduction to self, and explanation of the Nurses Navigator role. Diet:   Patient reports: Diabetic Diet    Activity:    Patient reports: mostly moving around the house.

## 2017-01-18 ENCOUNTER — TELEPHONE (OUTPATIENT)
Dept: FAMILY MEDICINE CLINIC | Age: 62
End: 2017-01-18

## 2017-01-18 LAB
BACTERIA SPEC CULT: NORMAL
SERVICE CMNT-IMP: NORMAL

## 2017-01-18 NOTE — TELEPHONE ENCOUNTER
Call received from caregiver, Caty Cole \"appt in afternoon on Monday 1/23/17 needs to be rescheduled for the morning due to conflicts. \" Spoke to [facility]  and referred to [facility] office nurse . Appt is rescheduled for 9:30a as pt is recently in hospital w/ multiple issues. Spoke to Caty Galvan to confirm and she is grateful for the assistance.

## 2017-01-23 ENCOUNTER — OFFICE VISIT (OUTPATIENT)
Dept: FAMILY MEDICINE CLINIC | Age: 62
End: 2017-01-23

## 2017-01-23 ENCOUNTER — HOSPITAL ENCOUNTER (OUTPATIENT)
Dept: LAB | Age: 62
Discharge: HOME | End: 2017-01-23

## 2017-01-23 VITALS
SYSTOLIC BLOOD PRESSURE: 146 MMHG | HEIGHT: 56 IN | DIASTOLIC BLOOD PRESSURE: 80 MMHG | WEIGHT: 155 LBS | BODY MASS INDEX: 34.87 KG/M2 | HEART RATE: 116 BPM | TEMPERATURE: 98.2 F

## 2017-01-23 DIAGNOSIS — I25.10 CORONARY ARTERY DISEASE INVOLVING NATIVE CORONARY ARTERY OF NATIVE HEART WITHOUT ANGINA PECTORIS: ICD-10-CM

## 2017-01-23 DIAGNOSIS — Z71.89 COUNSELING AND COORDINATION OF CARE: Primary | ICD-10-CM

## 2017-01-23 DIAGNOSIS — Z13.9 SCREENING: ICD-10-CM

## 2017-01-23 DIAGNOSIS — R73.9 ELEVATED BLOOD SUGAR: Primary | ICD-10-CM

## 2017-01-23 DIAGNOSIS — N18.30 CKD (CHRONIC KIDNEY DISEASE), STAGE III (HCC): ICD-10-CM

## 2017-01-23 DIAGNOSIS — N20.0 NEPHROLITHIASIS: ICD-10-CM

## 2017-01-23 DIAGNOSIS — Z23 ENCOUNTER FOR IMMUNIZATION: ICD-10-CM

## 2017-01-23 LAB
ANION GAP BLD CALC-SCNC: 10 MMOL/L (ref 5–15)
BUN SERPL-MCNC: 40 MG/DL (ref 6–20)
BUN/CREAT SERPL: 30 (ref 12–20)
CALCIUM SERPL-MCNC: 9 MG/DL (ref 8.5–10.1)
CHLORIDE SERPL-SCNC: 105 MMOL/L (ref 97–108)
CO2 SERPL-SCNC: 21 MMOL/L (ref 21–32)
CREAT SERPL-MCNC: 1.33 MG/DL (ref 0.55–1.02)
ERYTHROCYTE [DISTWIDTH] IN BLOOD BY AUTOMATED COUNT: 16.8 % (ref 11.5–14.5)
GLUCOSE POC: NORMAL MG/DL
GLUCOSE SERPL-MCNC: 135 MG/DL (ref 65–100)
HCT VFR BLD AUTO: 33.2 % (ref 35–47)
HGB BLD-MCNC: 10.4 G/DL
HGB BLD-MCNC: 10.5 G/DL (ref 11.5–16)
MCH RBC QN AUTO: 28.5 PG (ref 26–34)
MCHC RBC AUTO-ENTMCNC: 31.6 G/DL (ref 30–36.5)
MCV RBC AUTO: 90 FL (ref 80–99)
PLATELET # BLD AUTO: 174 K/UL (ref 150–400)
POTASSIUM SERPL-SCNC: 5.8 MMOL/L (ref 3.5–5.1)
RBC # BLD AUTO: 3.69 M/UL (ref 3.8–5.2)
SODIUM SERPL-SCNC: 136 MMOL/L (ref 136–145)
WBC # BLD AUTO: 6.6 K/UL (ref 3.6–11)

## 2017-01-23 PROCEDURE — 85027 COMPLETE CBC AUTOMATED: CPT | Performed by: NURSE PRACTITIONER

## 2017-01-23 PROCEDURE — 80048 BASIC METABOLIC PNL TOTAL CA: CPT | Performed by: NURSE PRACTITIONER

## 2017-01-23 NOTE — PATIENT INSTRUCTIONS

## 2017-01-23 NOTE — PROGRESS NOTES
Coordination of Care  1. Have you been to the ER, urgent care clinic since your last visit? Hospitalized since your last visit? Yes When: [facility], 01/13/2017, UTI    2. Have you seen or consulted any other health care providers outside of the [address] [place] [address] since your last visit? Include any pap smears or colon screening.  No    Medications  Medication Reconciliation Performed: no  Patient does not know need refills     Learning Assessment Complete? no   Results for orders placed or performed in visit on 01/23/17   AMB POC GLUCOSE BLOOD, BY GLUCOSE MONITORING DEVICE   Result Value Ref Range    Glucose POC non fsting 150 mg/dL     Results for orders placed or performed in visit on 01/23/17   AMB POC GLUCOSE BLOOD, BY GLUCOSE MONITORING DEVICE   Result Value Ref Range    Glucose POC non fsting 150 mg/dL   AMB POC HEMOGLOBIN (HGB)   Result Value Ref Range    Hemoglobin (POC) 10.4

## 2017-01-23 NOTE — PROGRESS NOTES
Given PAP medication Detrol LA 1 bottle of 90 . Given to pt and caregiver, Hank Duffy. AVS printed and reviewed. Discussed [facility] referral w/ caregiver, Hank Duffy and sent to met w/ , Sondra Russell. Given VIIS handout for Prevnar 13. Instructed in aftercare for Vaccines. No adverse reactions. No egg allergy. Long hx w/ cardiology and urology providers per Hank Duffy.

## 2017-01-23 NOTE — MR AVS SNAPSHOT
Visit Information Date & Time Provider Department Dept. Phone Encounter #  
 1/23/2017  9:30 [id number] Follow-up Instructions Return in about 2 months (around 3/23/2017), or if symptoms worsen or fail to improve. Upcoming Health Maintenance Date Due  
 EYE EXAM RETINAL OR DILATED Q1 2/24/1966 PAP AKA CERVICAL CYTOLOGY 2/24/1977 FOBT Q 1 YEAR AGE 50-75 5/12/2010 BREAST CANCER SCRN MAMMOGRAM 8/17/2012 LIPID PANEL Q1 6/24/2015 Pneumococcal 19-64 Highest Risk (2 of 3 - PCV13) 9/23/2015 FOOT EXAM Q1 11/24/2015 ZOSTER VACCINE AGE 60> 2/24/2016 MICROALBUMIN Q1 10/26/2016 HEMOGLOBIN A1C Q6M 5/29/2017 DTaP/Tdap/Td series (2 - Td) 10/26/2025 Allergies as of 1/23/2017  Review Complete On: 1/23/2017 By: Sanna Garza NP Severity Noted Reaction Type Reactions Hydrocodone  04/07/2011   Side Effect Nausea Only Ibuprofen  04/07/2011    Unknown (comments) Pt cannot take because of having only one kidney Penicillins  03/31/2011   Topical Rash Tolerates Cephalexin Current Immunizations  Reviewed on 11/24/2014 Name Date Influenza Vaccine 9/10/2012, 10/5/2010 Influenza Vaccine (Quad) PF 12/1/2016  1:18 PM, 10/26/2015, 11/24/2014 Pneumococcal Polysaccharide (PPSV-23) 9/23/2014 12:00 PM  
 Td 5/13/2011 Tdap 10/26/2015 Not reviewed this visit You Were Diagnosed With   
  
 Codes Comments Elevated blood sugar    -  Primary ICD-10-CM: R73.9 ICD-9-CM: 790.29 CKD (chronic kidney disease), stage III     ICD-10-CM: N18.3 ICD-9-CM: 221. 3 Coronary artery disease involving native coronary artery of native heart without angina pectoris     ICD-10-CM: I25.10 ICD-9-CM: 414.01 Nephrolithiasis     ICD-10-CM: N20.0 ICD-9-CM: 592.0 Screening     ICD-10-CM: Z13.9 ICD-9-CM: V82.9 Vitals BP Pulse Temp Height(growth percentile) Weight(growth percentile) BMI  
 146/80 (BP 1 Location: Left arm, BP Patient Position: Sitting) (!) 116 98.2 °F (36.8 °C) (Oral) 4' 7.98\" (1.422 m) 155 lb (70.3 kg) 34.77 kg/m2 OB Status Smoking Status Hysterectomy Never Smoker Vitals History BMI and BSA Data Body Mass Index Body Surface Area 34.77 kg/m 2 1.67 m 2 Preferred Pharmacy Pharmacy Name Phone [facility]  [phone] Your Updated Medication List  
  
   
This list is accurate as of: 1/23/17 10:29 AM.  Always use your most recent med list.  
  
  
  
  
 ACTOS 30 mg tablet Generic drug:  pioglitazone Take 30 mg by mouth daily. aspirin delayed-release 81 mg tablet Take 81 mg by mouth nightly. atorvastatin 10 mg tablet Commonly known as:  LIPITOR Take 1 Tab by mouth daily. To start this when you run out of crestor  
  
 cyanocobalamin 1,000 mcg tablet Take 1 Tab by mouth daily. For B12  
  
 glipiZIDE SR 2.5 mg CR tablet Commonly known as:  GLUCOTROL XL Take 1 Tab by mouth Daily (before breakfast). HYDROcodone-acetaminophen 5-325 mg per tablet Commonly known as:  Hansel Songster Take 1 Tab by mouth every four (4) hours as needed for up to 15 doses. Max Daily Amount: 6 Tabs. metoprolol tartrate 25 mg tablet Commonly known as:  LOPRESSOR Take 12.5 mg by mouth two (2) times a day. multivitamin with iron tablet Take 1 Tab by mouth daily. sertraline 50 mg tablet Commonly known as:  ZOLOFT  
take 1 tablet by mouth once daily (BEFORE BREAKFAST)  
  
 tolterodine ER 2 mg ER capsule Commonly known as:  DETROL-LA Take 2 mg by mouth nightly. We Performed the Following AMB POC GLUCOSE BLOOD, BY GLUCOSE MONITORING DEVICE [99633 CPT(R)] AMB POC HEMOGLOBIN (HGB) [82114 CPT(R)] REFERRAL TO UROLOGY [GQB547 Custom] Comments:  
 Please evaluate patient for Hx renal stents. Pt established w/ Dr. Gurjit Paeg and Leon Escoto at [facility]. Access Now Referral Request Form: 
 
Has the patient live in the area for 6 months Yes Is the patient here on a visa No 
 
Priority: 
 
4 weeks Location: [facility] Patient Demographics: 
 
Date:  1/23/2017                          EMR# [id number] Suad Baez 1955 Home Phone : [phone]             Cell Phone:   
 
Language :  [place] Specialist Requested:  Urology Reason for Request:  Renal stent  Requirements: 
 
If GYN Referral:   
Pap: n/a If Ortho Referral: MRI n/a      
XRAY: n/a Pulmonary Referrals must have :  
C-X-ray n/a OR  
CT Scan n/a Referring Provider:  Lindsey Bell NP Follow-up Instructions Return in about 2 months (around 3/23/2017), or if symptoms worsen or fail to improve. Referral Information Referral ID Referred By Referred To  
  
 [id number] Clara Rudolphgael Not Available Visits Status Start Date End Date 1 New Request 1/23/17 1/23/18 If your referral has a status of pending review or denied, additional information will be sent to support the outcome of this decision. Patient Instructions Shortness of Breath: Care Instructions Your Care Instructions Shortness of breath has many causes. Sometimes conditions such as anxiety can lead to shortness of breath. Some people get mild shortness of breath when they exercise. Trouble breathing also can be a symptom of a serious problem, such as asthma, lung disease, emphysema, heart problems, and pneumonia. If your shortness of breath continues, you may need tests and treatment. Watch for any changes in your breathing and other symptoms. Follow-up care is a key part of your treatment and safety.  Be sure to make and go to all appointments, and call your doctor if you are having problems. Its also a good idea to know your test results and keep a list of the medicines you take. How can you care for yourself at home? · Do not smoke or allow others to smoke around you. If you need help quitting, talk to your doctor about stop-smoking programs and medicines. These can increase your chances of quitting for good. · Get plenty of rest and sleep. · Take your medicines exactly as prescribed. Call your doctor if you think you are having a problem with your medicine. · Find healthy ways to deal with stress. ¨ Exercise daily. ¨ Get plenty of sleep. ¨ Eat regularly and well. When should you call for help? Call 911 anytime you think you may need emergency care. For example, call if: 
· You have severe shortness of breath. · You have symptoms of a heart attack. These may include: ¨ Chest pain or pressure, or a strange feeling in the chest. 
¨ Sweating. ¨ Shortness of breath. ¨ Nausea or vomiting. ¨ Pain, pressure, or a strange feeling in the back, neck, jaw, or upper belly or in one or both shoulders or arms. ¨ Lightheadedness or sudden weakness. ¨ A fast or irregular heartbeat. After you call 911, the  may tell you to chew 1 adult-strength or 2 to 4 low-dose aspirin. Wait for an ambulance. Do not try to drive yourself. Call your doctor now or seek immediate medical care if: 
· Your shortness of breath gets worse or you start to wheeze. Wheezing is a high-pitched sound when you breathe. · You wake up at night out of breath or have to prop your head up on several pillows to breathe. · You are short of breath after only light activity or while at rest. 
Watch closely for changes in your health, and be sure to contact your doctor if: 
· You do not get better over the next 1 to 2 days. Where can you learn more? Go to http://antoni-jacqui.info/. Enter S780 in the search box to learn more about \"Shortness of Breath: Care Instructions. \" 
 Current as of: May 23, 2016 Content Version: 11.1 © 9173-8050 [employer], [employer]. Care instructions adapted under license by [employer] (which disclaims liability or warranty for this information). If you have questions about a medical condition or this instruction, always ask your healthcare professional. [address] any warranty or liability for your use of this information. Introducing [address]! [facility] introduces Waizy patient portal. Now you can access parts of your medical record, email your doctor's office, and request medication refills online. 1. In your internet browser, go to [email or web address] 2. Click on the First Time User? Click Here link in the Sign In box. You will see the New Member Sign Up page. 3. Enter your Waizy Access Code exactly as it appears below. You will not need to use this code after youve completed the sign-up process. If you do not sign up before the expiration date, you must request a new code. · Waizy Access Code: [email or web address] Expires: 2/26/2017  4:09 PM 
 
4. Enter the last four digits of your Social Security Number (xxxx) and Date of Birth (mm/dd/yyyy) as indicated and click Submit. You will be taken to the next sign-up page. 5. Create a Waizy ID. This will be your Waizy login ID and cannot be changed, so think of one that is secure and easy to remember. 6. Create a Waizy password. You can change your password at any time. 7. Enter your Password Reset Question and Answer. This can be used at a later time if you forget your password. 8. Enter your e-mail address. You will receive e-mail notification when new information is available in [address] 9. Click Sign Up. You can now view and download portions of your medical record. 10. Click the Download Summary menu link to download a portable copy of your medical information. If you have questions, please visit the Frequently Asked Questions section of the Upstreamt website. Remember, [employer] is NOT to be used for urgent needs. For medical emergencies, dial 911. Now available from your iPhone and Android! Please provide this summary of care documentation to your next provider. Your primary care clinician is listed as Wendie Khan. If you have any questions after today's visit, please call [phone].

## 2017-01-23 NOTE — PROGRESS NOTES
Subjective:     Chief Complaint   Patient presents with   [facility] Follow Up     diabetes, UTI        She  is a 62 y.o. female who presents for re-evaluation of post hospital discharge. Caregiver/Nicole is present supplementing Hx. Pt denies any pains or new symptoms. Ifeanyi Merlos notes Pt has been having increased dyspnea x one month, noting 1-2 steps w/ make Pt winde and stops at every step. Similar concerns r/t walking > 1 block. No c/o of CP, dizziness, PND nor cough. Pt has not scheduled urology f/u since discharge. Is establish w/ Dr Bishop Lyn and Any Cho [facility] for her renal stent replacement. Denies any need for refills. Juan Nulato denies any new concerns/symptoms. ROS  Gen - no fever/chills  Resp - + AGUILAR, neg cough  CV - no chest pain, neg low ext edema,  + AGUILAR  Rest per HPI    Past Medical History   Diagnosis Date    Anemia     CKD (chronic kidney disease), stage II      only has one kidney (on right)    Coronary artery disease 9/17/2014     A. Echo (9/16/14):  EF 45% with mid-distal septal/anterior HK, DD.   PASP 37.    Coronary artery disease      MI 9/2014    Depression     Diabetes ([address]     DM type 2 causing renal disease ([address]     History of nephrectomy      Right kidney    Hx MRSA infection      had negative swab 12/30/2013    Hyperlipidemia     Hypertension     Mental retardation     Nephrolithiasis      hx    Uterine cancer ([address]      hx    UTI (lower urinary tract infection)     Vitamin D deficiency      Past Surgical History   Procedure Laterality Date    Hx orthopaedic       ORIF left arm, hx of fracture    Hx orthopaedic       Right ankle fx repair    Hx cholecystectomy      Hx hysterectomy      Hx dilation and curettage      Hx other surgical  9/2014     I&D right leg wound    Hx urological  1998     right nephrectomy    Hx urological Left      multiple renal stents     Current Outpatient Prescriptions on File Prior to Visit   Medication Sig Dispense Refill    glipiZIDE SR (GLUCOTROL XL) 2.5 mg CR tablet Take 1 Tab by mouth Daily (before breakfast). 30 Tab 0    metoprolol tartrate (LOPRESSOR) 25 mg tablet Take 12.5 mg by mouth two (2) times a day.  pioglitazone (ACTOS) 30 mg tablet Take 30 mg by mouth daily.  aspirin delayed-release 81 mg tablet Take 81 mg by mouth nightly.  tolterodine ER (DETROL-LA) 2 mg ER capsule Take 2 mg by mouth nightly.  HYDROcodone-acetaminophen (NORCO) 5-325 mg per tablet Take 1 Tab by mouth every four (4) hours as needed for up to 15 doses. Max Daily Amount: 6 Tabs. 15 Tab 0    multivitamin with iron tablet Take 1 Tab by mouth daily.  atorvastatin (LIPITOR) 10 mg tablet Take 1 Tab by mouth daily. To start this when you run out of crestor 90 Tab 1    sertraline (ZOLOFT) 50 mg tablet take 1 tablet by mouth once daily (BEFORE BREAKFAST) 90 Tab 3    cyanocobalamin 1,000 mcg tablet Take 1 Tab by mouth daily. For B12 180 Tab 1     No current facility-administered medications on file prior to visit. Objective:     Vitals:    01/23/17 0938   BP: 146/80   Pulse: (!) 116   Temp: 98.2 °F (36.8 °C)   TempSrc: Oral   Weight: 155 lb (70.3 kg)   Height: 4' 7.98\" (1.422 m)       Physical Examination:  General appearance - alert, well appearing, and in no distress  Eyes -sclera anicteric  Neck - supple, no significant adenopathy, no thyromegaly  Chest - clear to auscultation, no wheezes, rales or rhonchi, symmetric air entry  Heart - Tachycardia noted (manual pulse 110), regular rhythm, normal S1, S2, no murmurs, rubs, clicks or gallops  Neurological - alert, oriented, no focal findings or movement disorder noted  Abdomen-BS present/WNL x 4 quads, non-tender/distended, soft,no organomegaly    Assessment/ Plan:   Sarmad Marroquin was seen today for hospital follow up.     Diagnoses and all orders for this visit:    Elevated blood sugar  -     AMB POC GLUCOSE BLOOD, BY GLUCOSE MONITORING DEVICE    CKD (chronic kidney disease), stage III  -     METABOLIC PANEL, BASIC; Future  -     CBC W/O DIFF; Future    Coronary artery disease involving native coronary artery of native heart without angina pectoris    H/O Nephrolithiasis  -     REFERRAL TO UROLOGY    Screening  -     AMB POC HEMOGLOBIN (HGB)    Encounter for immunization  -     Pneumococcal conjugate 13 valent, IM (PREVNAR-13)       SOB r/t anemia? POC Hgb notes improvement since hospital check. Last CV f/u was > 2 years ago for stent eval. Recommend routine f/u to confirm SOB is not CV related. Repeat BMP and confirm Hgb via venipuncture and start anemia Tx as needed. Refer to urology via AN. Give PNA vaccine today if available. RTC in 2 months w/ appt. I have discussed the diagnosis with the patient and the intended plan as seen in the above orders. The patient has received an after-visit summary and questions were answered concerning future plans. I have discussed medication side effects and warnings with the patient as well. The patient verbalizes understanding and agreement with the plan. Follow-up Disposition:  Return in about 2 months (around 3/23/2017), or if symptoms worsen or fail to improve.

## 2017-01-24 NOTE — PROGRESS NOTES
Please let Pt's caregiver Nemesio Castillo know Pt's lab confirmed increased Hgb by almost 1.5 which was similar to POC test yesterday during LOV. Low suspicion Pt's borderline but improving anemia is causing her AGUILAR. Renal function values improved since discharge from [facility]. Recommend cardiology micki woo/ Dr. Magaly Loya.

## 2017-01-25 NOTE — PROGRESS NOTES
Met with patient for Access Now Referral. Income verification pending for completion of financial screening.  Alttiff Lurdes

## 2017-01-31 NOTE — PROGRESS NOTES
Telephone call made to patient's caregiver, [facility]. There was no answer on her name identified voicemail so a message was left that the [facility]AV was calling to give her some lab results for Dev Kevin and to please call the [facility] office at her convenience.  Tomer Lei RN

## 2017-02-01 ENCOUNTER — TELEPHONE (OUTPATIENT)
Dept: FAMILY MEDICINE CLINIC | Age: 62
End: 2017-02-01

## 2017-02-01 NOTE — TELEPHONE ENCOUNTER
Kartik Chong returned your call regarding results.   Please call her at [phone] after 2:30 PM.    Luisa Díaz April

## 2017-02-01 NOTE — PROGRESS NOTES
Return call made to patient's caregiver, [facility]. We reviewed the provider's lab result note and Bubba Ramon stated that they will schedule an appointment for the patient with Dr. Theresa Rubi, cardiology.  Ilsa Rosenthal RN

## 2017-03-20 ENCOUNTER — DOCUMENTATION ONLY (OUTPATIENT)
Dept: FAMILY MEDICINE CLINIC | Age: 62
End: 2017-03-20

## 2017-03-20 NOTE — PROGRESS NOTES
Receipt of patient's PAP order of Detrol LA, 2 mg, 90 capsules logged into the log book. Routing to the provider for dose confirmation and permission to deliver to the patient. Nanci Maxwell RN  Tracking Events for the Last 12 Hours   24 Hours   48 Hours   No recent dispensing events. tolterodine ER (DETROL-LA) 2 mg ER capsule [[id number]]   Order Details   Dose: 2 mg Route: Oral Frequency: EVERY BEDTIME   Dispense Quantity:  -- Refills:  -- Fills Remaining:  --           Sig: Take 2 mg by mouth nightly.          Written Date:  -- Expiration Date:  -- Ordering Date:  01/13/17    Start Date:  -- End Date:  --            Ordering Provider:  -- NATA #:  -- NPI:  --    Authorizing Provider:  Historical Provider NATA #:  -- NPI:  --       Pharmacy Comments:  --          Quantity Remaining:  -- Quantity Filled:  --        Priority and Order Details   Priority Class      Routine Historical Med    Warnings History   No Interaction Warnings Shown    Order History   Historical Medication   Date/Time Action Taken User Additional Information   01/13/17 1949 Historical Med Noted Josiah Larios [facility]    01/13/17 2002 Order for Admission Kaushal Roberto MD SR TNYUR: [id number]   01/16/17 1147 Resume at Discharge Veronique Hutchins MD    Medication Detail      Disp Refills Start End      tolterodine ER (DETROL-LA) 2 mg ER capsule        Sig - Route: Take 2 mg by mouth nightly.  - Oral    Class: Historical Med

## 2017-03-21 NOTE — PROGRESS NOTES
Telephone call made to patient's caregiver, [facility]. There was no answer on her name identified voicemail so the following message was left: we have received the PAP order of Earnest CLINE for Darcie Favre and will have it for her to pick-up at her 12:30 appointment on Monday, 03-27-17 at the [facility] clinic in [facility]. The [facility] office number was also left in case she needs to contact us.  Paula Pillai RN

## 2017-03-27 ENCOUNTER — OFFICE VISIT (OUTPATIENT)
Dept: FAMILY MEDICINE CLINIC | Age: 62
End: 2017-03-27

## 2017-03-27 VITALS
BODY MASS INDEX: 34.1 KG/M2 | WEIGHT: 152 LBS | DIASTOLIC BLOOD PRESSURE: 76 MMHG | TEMPERATURE: 98.4 F | SYSTOLIC BLOOD PRESSURE: 133 MMHG | HEART RATE: 68 BPM

## 2017-03-27 DIAGNOSIS — R73.9 ELEVATED BLOOD SUGAR: Primary | ICD-10-CM

## 2017-03-27 DIAGNOSIS — E11.9 TYPE 2 DIABETES MELLITUS WITHOUT COMPLICATION, WITHOUT LONG-TERM CURRENT USE OF INSULIN (HCC): ICD-10-CM

## 2017-03-27 LAB — GLUCOSE POC: 173 MG/DL

## 2017-03-27 NOTE — PROGRESS NOTES
AVS printed and reviewed. Picked up the PAP medication Detrol LA 2mg x 1 bottle of 90 tabs. Caregiver Dilcia Campbell w/ pt today.

## 2017-03-27 NOTE — PROGRESS NOTES
Coordination of Care  1. Have you been to the ER, urgent care clinic since your last visit? Hospitalized since your last visit? No    2. Have you seen or consulted any other health care providers outside of the [address] [place] [address] since your last visit? Include any pap smears or colon screening.  No    Medications  Medication Reconciliation Performed: no  Patient does not need refills     Learning Assessment Complete? no  Results for orders placed or performed in visit on 03/27/17   AMB POC GLUCOSE BLOOD, BY GLUCOSE MONITORING DEVICE   Result Value Ref Range    Glucose  mg/dL

## 2017-03-28 NOTE — PROGRESS NOTES
Assessment/Plan:    Oracio Scanlon was seen today for follow-up. Diagnoses and all orders for this visit:    Elevated blood sugar  -     AMB POC GLUCOSE BLOOD, BY GLUCOSE MONITORING DEVICE    Type 2 diabetes mellitus without complication, without long-term current use of insulin (HCC)  -     HEMOGLOBIN A1C WITH EAG; Future  -     TSH 3RD GENERATION; Future  -     METABOLIC PANEL, COMPREHENSIVE; Future  -     LIPID PANEL; Future        Follow-up Disposition:  Return in about 1 month (around 4/27/2017). BETO Washburn expressed understanding of this plan. An AVS was printed and given to the patient.      ----------------------------------------------------------------------    Chief Complaint   Patient presents with    Follow-up       History of Present Illness:  Since her admission for stent replacement and UTI in 1/17, the pt reports that she has been doing \"very well\". She tells me that she no longer feels short of breath, that now she is walking daily to and from her mailbox which is up a small hill and she never feels winded. She also tells me that she is not eating biscuits daily but instead is eating more fruits and vegetables. She tells me that she no longer wears her depends to sleep in, now she will get up and use the restroom over night if needed rather then wet her depends. She is very happy about all these changes and tells me that she has to keep herself well for her dog that she loves so much. Her friend, Arabella Estrada, confirms that all these changes have been happening. Arabella Jewellgael also reports that now the pt is cleaning her house, washing her dishes, making her bed and other things that she was not doing before. The pt has f/up with uro for stent change in June or July. The date is not set yet. She notes that she is \"peeing\" just fine, no problems to report.   Her blood sugar is not known  She does not have any vaginal itching or increased thirst. She was happy to tell me that she has lost a pound with all the changes she is making      Past Medical History:   Diagnosis Date    Anemia     CKD (chronic kidney disease), stage II     only has one kidney (on right)    Coronary artery disease 9/17/2014    A. Echo (9/16/14):  EF 45% with mid-distal septal/anterior HK, DD. PASP 37.    Coronary artery disease     MI 9/2014    Depression     Diabetes [facility]     DM type 2 causing renal disease ([facility]     History of nephrectomy     Right kidney    Hx MRSA infection     had negative swab 12/30/2013    Hyperlipidemia     Hypertension     Mental retardation     Nephrolithiasis     hx    Uterine cancer (HCC)     hx    UTI (lower urinary tract infection)     Vitamin D deficiency        Current Outpatient Prescriptions   Medication Sig Dispense Refill    glipiZIDE SR (GLUCOTROL XL) 2.5 mg CR tablet Take 1 Tab by mouth Daily (before breakfast). 30 Tab 0    metoprolol tartrate (LOPRESSOR) 25 mg tablet Take 12.5 mg by mouth two (2) times a day.  pioglitazone (ACTOS) 30 mg tablet Take 30 mg by mouth daily.  aspirin delayed-release 81 mg tablet Take 81 mg by mouth nightly.  tolterodine ER (DETROL-LA) 2 mg ER capsule Take 2 mg by mouth nightly.  HYDROcodone-acetaminophen (NORCO) 5-325 mg per tablet Take 1 Tab by mouth every four (4) hours as needed for up to 15 doses. Max Daily Amount: 6 Tabs. 15 Tab 0    multivitamin with iron tablet Take 1 Tab by mouth daily.  atorvastatin (LIPITOR) 10 mg tablet Take 1 Tab by mouth daily. To start this when you run out of crestor 90 Tab 1    sertraline (ZOLOFT) 50 mg tablet take 1 tablet by mouth once daily (BEFORE BREAKFAST) 90 Tab 3    cyanocobalamin 1,000 mcg tablet Take 1 Tab by mouth daily.  For B12 180 Tab 1       Allergies   Allergen Reactions    Hydrocodone Nausea Only    Ibuprofen Unknown (comments)     Pt cannot take because of having only one kidney    Penicillins Rash     Tolerates Cephalexin        Social History Substance Use Topics    Smoking status: Never Smoker    Smokeless tobacco: Never Used    Alcohol use No       Family History   Problem Relation Age of Onset    Mental Retardation Mother     Heart Disease Mother     Cancer Sister      breast    Malignant Hyperthermia Neg Hx     Pseudocholinesterase Deficiency Neg Hx     Delayed Awakening Neg Hx     Post-op Nausea/Vomiting Neg Hx     Emergence Delirium Neg Hx     Post-op Cognitive Dysfunction Neg Hx        Physical Exam:     Visit Vitals    /76 (BP 1 Location: Left arm, BP Patient Position: Sitting)    Pulse 68    Temp 98.4 °F (36.9 °C) (Oral)    Wt 152 lb (68.9 kg)    BMI 34.1 kg/m2       A&Ox3  WDWN NAD  Respirations normal and non labored  Lab Results   Component Value Date/Time    Hemoglobin A1c 7.1 11/29/2016 07:29 AM     Lab Results   Component Value Date/Time    Sodium 136 01/23/2017 10:28 AM    Potassium 5.8 01/23/2017 10:28 AM    Chloride 105 01/23/2017 10:28 AM    CO2 21 01/23/2017 10:28 AM    Anion gap 10 01/23/2017 10:28 AM    Glucose 135 01/23/2017 10:28 AM    BUN 40 01/23/2017 10:28 AM    Creatinine 1.33 01/23/2017 10:28 AM    BUN/Creatinine ratio 30 01/23/2017 10:28 AM    GFR est AA 49 01/23/2017 10:28 AM    GFR est non-AA 41 01/23/2017 10:28 AM    Calcium 9.0 01/23/2017 10:28 AM    Bilirubin, total 0.2 01/13/2017 07:37 PM    AST (SGOT) 24 01/13/2017 07:37 PM    Alk.  phosphatase 116 01/13/2017 07:37 PM    Protein, total 7.5 01/13/2017 07:37 PM    Albumin 3.1 01/13/2017 07:37 PM    Globulin 4.4 01/13/2017 07:37 PM    A-G Ratio 0.7 01/13/2017 07:37 PM    ALT (SGPT) 14 01/13/2017 07:37 PM     Lab Results   Component Value Date/Time    TSH 2.62 06/24/2014 04:48 AM

## 2017-05-10 RX ORDER — SERTRALINE HYDROCHLORIDE 50 MG/1
TABLET, FILM COATED ORAL
Qty: 90 TAB | Refills: 3 | Status: SHIPPED | OUTPATIENT
Start: 2017-05-10 | End: 2018-03-26 | Stop reason: SDUPTHER

## 2017-05-16 ENCOUNTER — HOSPITAL ENCOUNTER (OUTPATIENT)
Dept: PREADMISSION TESTING | Age: 62
Discharge: HOME | End: 2017-05-16
Payer: SELF-PAY

## 2017-05-16 VITALS
DIASTOLIC BLOOD PRESSURE: 66 MMHG | HEIGHT: 61 IN | WEIGHT: 152.34 LBS | SYSTOLIC BLOOD PRESSURE: 135 MMHG | RESPIRATION RATE: 17 BRPM | OXYGEN SATURATION: 97 % | TEMPERATURE: 98.4 F | HEART RATE: 96 BPM | BODY MASS INDEX: 28.76 KG/M2

## 2017-05-16 LAB
ALBUMIN SERPL BCP-MCNC: 3.7 G/DL (ref 3.5–5)
ALBUMIN/GLOB SERPL: 0.9 {RATIO} (ref 1.1–2.2)
ALP SERPL-CCNC: 138 U/L (ref 45–117)
ALT SERPL-CCNC: 20 U/L (ref 12–78)
ANION GAP BLD CALC-SCNC: 13 MMOL/L (ref 5–15)
APPEARANCE UR: ABNORMAL
AST SERPL W P-5'-P-CCNC: 19 U/L (ref 15–37)
ATRIAL RATE: 79 BPM
BACTERIA URNS QL MICRO: ABNORMAL /HPF
BASOPHILS # BLD AUTO: 0 K/UL (ref 0–0.1)
BASOPHILS # BLD: 1 % (ref 0–1)
BILIRUB SERPL-MCNC: 0.2 MG/DL (ref 0.2–1)
BILIRUB UR QL: NEGATIVE
BUN SERPL-MCNC: 42 MG/DL (ref 6–20)
BUN/CREAT SERPL: 28 (ref 12–20)
CALCIUM SERPL-MCNC: 8.3 MG/DL (ref 8.5–10.1)
CALCULATED P AXIS, ECG09: 65 DEGREES
CALCULATED R AXIS, ECG10: 30 DEGREES
CALCULATED T AXIS, ECG11: 49 DEGREES
CHLORIDE SERPL-SCNC: 101 MMOL/L (ref 97–108)
CO2 SERPL-SCNC: 23 MMOL/L (ref 21–32)
COLOR UR: ABNORMAL
CREAT SERPL-MCNC: 1.5 MG/DL (ref 0.55–1.02)
DIAGNOSIS, 93000: NORMAL
EOSINOPHIL # BLD: 0.1 K/UL (ref 0–0.4)
EOSINOPHIL NFR BLD: 1 % (ref 0–7)
EPITH CASTS URNS QL MICRO: ABNORMAL /LPF
ERYTHROCYTE [DISTWIDTH] IN BLOOD BY AUTOMATED COUNT: 15.8 % (ref 11.5–14.5)
GLOBULIN SER CALC-MCNC: 3.9 G/DL (ref 2–4)
GLUCOSE SERPL-MCNC: 171 MG/DL (ref 65–100)
GLUCOSE UR STRIP.AUTO-MCNC: NEGATIVE MG/DL
HCT VFR BLD AUTO: 34.4 % (ref 35–47)
HGB BLD-MCNC: 10.7 G/DL (ref 11.5–16)
HGB UR QL STRIP: ABNORMAL
KETONES UR QL STRIP.AUTO: NEGATIVE MG/DL
LEUKOCYTE ESTERASE UR QL STRIP.AUTO: ABNORMAL
LYMPHOCYTES # BLD AUTO: 18 % (ref 12–49)
LYMPHOCYTES # BLD: 1.3 K/UL (ref 0.8–3.5)
MCH RBC QN AUTO: 27.9 PG (ref 26–34)
MCHC RBC AUTO-ENTMCNC: 31.1 G/DL (ref 30–36.5)
MCV RBC AUTO: 89.6 FL (ref 80–99)
MONOCYTES # BLD: 0.5 K/UL (ref 0–1)
MONOCYTES NFR BLD AUTO: 7 % (ref 5–13)
NEUTS SEG # BLD: 5.4 K/UL (ref 1.8–8)
NEUTS SEG NFR BLD AUTO: 73 % (ref 32–75)
NITRITE UR QL STRIP.AUTO: POSITIVE
P-R INTERVAL, ECG05: 136 MS
PH UR STRIP: 6 [PH] (ref 5–8)
PLATELET # BLD AUTO: 154 K/UL (ref 150–400)
POTASSIUM SERPL-SCNC: 4.5 MMOL/L (ref 3.5–5.1)
PROT SERPL-MCNC: 7.6 G/DL (ref 6.4–8.2)
PROT UR STRIP-MCNC: 30 MG/DL
Q-T INTERVAL, ECG07: 352 MS
QRS DURATION, ECG06: 80 MS
QTC CALCULATION (BEZET), ECG08: 403 MS
RBC # BLD AUTO: 3.84 M/UL (ref 3.8–5.2)
RBC #/AREA URNS HPF: ABNORMAL /HPF (ref 0–5)
SODIUM SERPL-SCNC: 137 MMOL/L (ref 136–145)
SP GR UR REFRACTOMETRY: 1.01 (ref 1–1.03)
UA: UC IF INDICATED,UAUC: ABNORMAL
UROBILINOGEN UR QL STRIP.AUTO: 0.2 EU/DL (ref 0.2–1)
VENTRICULAR RATE, ECG03: 79 BPM
WBC # BLD AUTO: 7.4 K/UL (ref 3.6–11)
WBC URNS QL MICRO: >100 /HPF (ref 0–4)

## 2017-05-16 PROCEDURE — 87186 SC STD MICRODIL/AGAR DIL: CPT | Performed by: UROLOGY

## 2017-05-16 PROCEDURE — 36415 COLL VENOUS BLD VENIPUNCTURE: CPT | Performed by: UROLOGY

## 2017-05-16 PROCEDURE — 81001 URINALYSIS AUTO W/SCOPE: CPT | Performed by: UROLOGY

## 2017-05-16 PROCEDURE — 87077 CULTURE AEROBIC IDENTIFY: CPT | Performed by: UROLOGY

## 2017-05-16 PROCEDURE — 87086 URINE CULTURE/COLONY COUNT: CPT | Performed by: UROLOGY

## 2017-05-16 PROCEDURE — 93005 ELECTROCARDIOGRAM TRACING: CPT

## 2017-05-16 PROCEDURE — 80053 COMPREHEN METABOLIC PANEL: CPT | Performed by: UROLOGY

## 2017-05-16 PROCEDURE — 85025 COMPLETE CBC W/AUTO DIFF WBC: CPT | Performed by: UROLOGY

## 2017-05-16 RX ORDER — ROSUVASTATIN CALCIUM 5 MG/1
5 TABLET, COATED ORAL DAILY
COMMUNITY
End: 2017-06-26

## 2017-05-16 NOTE — PERIOP NOTES
[facility]  PREOPERATIVE INSTRUCTIONS    Surgery Date:   Thursday, 5/25/17  Surgery arrival time given by surgeon: NO   If no,[facility] [address] staff will call you between 4 PM- 8 PM the day before surgery with your arrival time. Please call [phone] after 8 PM if you did not receive your arrival time. 1. Please report at the designated time to the [address]nd [address] [facility]. Bring your insurance card, photo identification, and any copayment ( if applicable). 2. You must have a responsible adult to drive you home. You need to have a responsible adult to stay with you the first 24 hours after surgery if you are going home the same day of your surgery and you should not drive a car for 24 hours following your surgery. 3. Nothing to eat or drink after midnight the night before surgery. This includes no water, gum, mints, coffee, juice, etc.  Please note special instructions, if applicable, below for medications. 4. MEDICATIONS TO TAKE THE MORNING OF SURGERY WITH A SIP OF WATER: Crestor, Metoprolol, Zoloft, NO diabetic medications morning of surgery  5. No alcoholic beverages 24 hours before or after your surgery. 6. If you are being admitted to the hospital,please leave personal belongings/luggage in your car until you have an assigned hospital room number. 7. Stop Aspirin and/or any non-steroidal anti-inflammatory drugs (i.e. Ibuprofen, Naproxen, Advil, Aleve) as directed by your surgeon. You may take Tylenol. Stop herbal supplements 1 week prior to  surgery. 8. If you are currently taking Plavix, Coumadin,or any other blood-thinning/anticoagulant medication contact your surgeon for instructions. 9. Please wear comfortable clothes. Wear your glasses instead of contacts. We ask that all money, jewelry and valuables be left at home. Wear no make up, particularly mascara, the day of surgery. 10.  All body piercings, rings,and jewelry need to be removed and left at home. Please wear your hair loose or down. Please no pony-tails, buns, or any metal hair accessories. If you shower the morning of surgery, please do not apply any lotions, powders, or deodorants afterwards. Do not shave any body area within 24 hours of your surgery. 11. Please follow all instructions to avoid any potential surgical cancellation. 12.  Should your physical condition change, (i.e. fever, cold, flu, etc.) please notify your surgeon as soon as possible. 13. It is important to be on time. If a situation occurs where you may be delayed, please call:  [phone] / [phone] on the day of surgery. 14. The Preadmission Testing staff can be reached at 21 [phone]. .  15. Special instructions: BRING medication list with last dose taken morning of surgery, free  parking 7-5 pm    The patient was contacted  in person. She  verbalize  understanding of all instructions does not  need reinforcement.

## 2017-05-16 NOTE — H&P
PAT Pre-Op History & Physical    Patient: Vera Spencer                  MRN: [id number]          SSN: [id number]  YOB: 1955          Age: 65 y.o. Sex: female                Subjective:   Patient is a 65 y.o.  female who presents with history of left hydronephrosis (attributed to narrowing of the ureter secondary to radiation therapy for cervical cancer). Has solitary kidney due to right nephrectomy in 1998. Requires periodic cystoscopies and stent exchange. Last stent excahang/cysto done 1/10/2017 when the patient was hospitalized for UTI/ IV antibiotics. Patient denies any discomfort at this time related to the stent or with urination. The patient was evaluated in the surgeon's office and it was determined that the most appropriate plan of care is to proceed with surgical intervention. Patient's PCP CARRIE Sutton      Past Medical History:   Diagnosis Date    Anemia     Chronic kidney disease     stage 3    CKD (chronic kidney disease), stage II     only has one kidney (on right). CKD stage III per lab results as far back as 2010.  Coronary artery disease 9/17/2014    A. Echo (9/16/14):  EF 45% with mid-distal septal/anterior HK, DD.   PASP 37.    Coronary artery disease     MI 9/2014    Depression     Diabetes ([address]     DM type 2 causing renal disease ([address]     History of nephrectomy     Right kidney    Hx MRSA infection     had negative swab [id number] but + swab on 5/16/2017    Hyperlipidemia     Hypertension     Mental retardation     Nephrolithiasis     hx    Uterine cancer ([address]     hx    UTI (lower urinary tract infection)     Vitamin D deficiency       Past Surgical History:   Procedure Laterality Date    HX CHOLECYSTECTOMY      HX DILATION AND CURETTAGE      HX HYSTERECTOMY      HX ORTHOPAEDIC      ORIF left arm, hx of fracture    HX ORTHOPAEDIC      Right ankle fx repair    HX OTHER SURGICAL  9/2014    I&D right leg wound    HX UROLOGICAL  1998    right nephrectomy    HX UROLOGICAL Left     multiple renal stents      Prior to Admission medications    Medication Sig Start Date End Date Taking? Authorizing Provider   rosuvastatin (CRESTOR) 5 mg tablet Take 5 mg by mouth daily. Yes Historical Provider   sertraline (ZOLOFT) 50 mg tablet take 1 tablet by mouth once daily BEFORE BREAKFAST 5/10/17  Yes CARRIE Zeng   glipiZIDE SR (GLUCOTROL XL) 2.5 mg CR tablet Take 1 Tab by mouth Daily (before breakfast). 1/16/17  Yes Matthew Norton MD   metoprolol tartrate (LOPRESSOR) 25 mg tablet Take 12.5 mg by mouth two (2) times a day. Yes Historical Provider   pioglitazone (ACTOS) 30 mg tablet Take 30 mg by mouth daily. Yes Historical Provider   aspirin delayed-release 81 mg tablet Take 81 mg by mouth nightly. Yes Historical Provider   tolterodine ER (DETROL-LA) 2 mg ER capsule Take 2 mg by mouth nightly. Yes Historical Provider   multivitamin with iron tablet Take 1 Tab by mouth daily. Yes Historical Provider   cyanocobalamin 1,000 mcg tablet Take 1 Tab by mouth daily. For B12 2/24/14  Yes Apple York NP     Current Outpatient Prescriptions   Medication Sig    rosuvastatin (CRESTOR) 5 mg tablet Take 5 mg by mouth daily.  sertraline (ZOLOFT) 50 mg tablet take 1 tablet by mouth once daily BEFORE BREAKFAST    glipiZIDE SR (GLUCOTROL XL) 2.5 mg CR tablet Take 1 Tab by mouth Daily (before breakfast).  metoprolol tartrate (LOPRESSOR) 25 mg tablet Take 12.5 mg by mouth two (2) times a day.  pioglitazone (ACTOS) 30 mg tablet Take 30 mg by mouth daily.  aspirin delayed-release 81 mg tablet Take 81 mg by mouth nightly.  tolterodine ER (DETROL-LA) 2 mg ER capsule Take 2 mg by mouth nightly.  multivitamin with iron tablet Take 1 Tab by mouth daily.  cyanocobalamin 1,000 mcg tablet Take 1 Tab by mouth daily. For B12     No current facility-administered medications for this encounter.        Allergies   Allergen Reactions    Hydrocodone Nausea Only    Ibuprofen Unknown (comments)     Pt cannot take because of having only one kidney    Penicillins Rash     Tolerates Cephalexin       Social History   Substance Use Topics    Smoking status: Never Smoker    Smokeless tobacco: Never Used    Alcohol use No      History   Drug Use No     Family History   Problem Relation Age of Onset    Mental Retardation Mother     Heart Disease Mother     Breast Cancer Sister     No Known Problems Brother     Malignant Hyperthermia Neg Hx     Pseudocholinesterase Deficiency Neg Hx     Delayed Awakening Neg Hx     Post-op Nausea/Vomiting Neg Hx     Emergence Delirium Neg Hx     Post-op Cognitive Dysfunction Neg Hx          Review of Systems    Patient denies difficulty swallowing, mouth sores, or loose teeth. Patient denies any recent dental procedures or any planned prior to surgery. Patient denies chest pain, tightness, pain radiating down left arm, palpitations. Denies dizziness, visual disturbances, or lightheadedness. Patient denies shortness of breath, wheezing, cough, fever, or chills. Patient denies diarrhea, constipation, or abdominal pain. Patient denies urinary problems including dysuria, hesitancy, urgency, or incontinence. Denies skin breakdown, rashes, insect bites or open area. Objective:   Patient Vitals for the past 24 hrs:   Temp Pulse Resp BP SpO2   17 1454 98.4 °F (36.9 °C) 96 17 135/66 97 %     Temp (24hrs), Av.4 °F (36.9 °C), Min:98.4 °F (36.9 °C), Max:98.4 °F (36.9 °C)    Body mass index is 28.78 kg/(m^2). Wt Readings from Last 1 Encounters:   17 69.1 kg (152 lb 5.4 oz)        Physical Exam:     General: Pleasant,  cooperative, no apparent distress, appears stated age. Eyes: Conjunctivae/corneas clear. EOMs intact. Nose: Nares normal.   Mouth/Throat: Lips, mucosa, and tongue normal. Upper dentures in place- no lower teeth-gums normal.   Neck: Supple, symmetrical, trachea midline.     Back: Symmetric. No CVA tenderness. Lungs: Clear to auscultation bilaterally. Heart: Regular rate and rhythm, S1, S2 normal. No murmur, click, rub or gallop. Abdomen: Soft, non-tender. Bowel sounds normal. No distention. Musculoskeletal:  Unremarkable. Extremities:  Extremities normal, atraumatic, no cyanosis or edema. Calves                                 supple, non tender to palpation. Pulses: 2+ and symmetric bilateral upper extremities. Cap. refill <2 seconds   Skin: Skin color, texture, turgor normal.  No rashes or lesions. Neurologic: CN II-XII grossly intact. Alert and oriented x3. Labs:   Recent Results (from the past 72 hour(s))   CBC WITH AUTOMATED DIFF    Collection Time: 05/16/17  2:36 PM   Result Value Ref Range    WBC 7.4 3.6 - 11.0 K/uL    RBC 3.84 3.80 - 5.20 M/uL    HGB 10.7 (L) 11.5 - 16.0 g/dL    HCT 34.4 (L) 35.0 - 47.0 %    MCV 89.6 80.0 - 99.0 FL    MCH 27.9 26.0 - 34.0 PG    MCHC 31.1 30.0 - 36.5 g/dL    RDW 15.8 (H) 11.5 - 14.5 %    PLATELET 012 680 - 105 K/uL    NEUTROPHILS 73 32 - 75 %    LYMPHOCYTES 18 12 - 49 %    MONOCYTES 7 5 - 13 %    EOSINOPHILS 1 0 - 7 %    BASOPHILS 1 0 - 1 %    ABS. NEUTROPHILS 5.4 1.8 - 8.0 K/UL    ABS. LYMPHOCYTES 1.3 0.8 - 3.5 K/UL    ABS. MONOCYTES 0.5 0.0 - 1.0 K/UL    ABS. EOSINOPHILS 0.1 0.0 - 0.4 K/UL    ABS. BASOPHILS 0.0 0.0 - 0.1 K/UL   METABOLIC PANEL, COMPREHENSIVE    Collection Time: 05/16/17  2:36 PM   Result Value Ref Range    Sodium 137 136 - 145 mmol/L    Potassium 4.5 3.5 - 5.1 mmol/L    Chloride 101 97 - 108 mmol/L    CO2 23 21 - 32 mmol/L    Anion gap 13 5 - 15 mmol/L    Glucose 171 (H) 65 - 100 mg/dL    BUN 42 (H) 6 - 20 MG/DL    Creatinine 1.50 (H) 0.55 - 1.02 MG/DL    BUN/Creatinine ratio 28 (H) 12 - 20      GFR est AA 43 (L) >60 ml/min/1.73m2    GFR est non-AA 35 (L) >60 ml/min/1.73m2    Calcium 8.3 (L) 8.5 - 10.1 MG/DL    Bilirubin, total 0.2 0.2 - 1.0 MG/DL    ALT (SGPT) 20 12 - 78 U/L    AST (SGOT) 19 15 - 37 U/L    Alk. phosphatase 138 (H) 45 - 117 U/L    Protein, total 7.6 6.4 - 8.2 g/dL    Albumin 3.7 3.5 - 5.0 g/dL    Globulin 3.9 2.0 - 4.0 g/dL    A-G Ratio 0.9 (L) 1.1 - 2.2     URINALYSIS W/ REFLEX CULTURE    Collection Time: 05/16/17  2:36 PM   Result Value Ref Range    Color YELLOW/STRAW      Appearance TURBID (A) CLEAR      Specific gravity 1.011 1.003 - 1.030      pH (UA) 6.0 5.0 - 8.0      Protein 30 (A) NEG mg/dL    Glucose NEGATIVE  NEG mg/dL    Ketone NEGATIVE  NEG mg/dL    Bilirubin NEGATIVE  NEG      Blood MODERATE (A) NEG      Urobilinogen 0.2 0.2 - 1.0 EU/dL    Nitrites POSITIVE (A) NEG      Leukocyte Esterase LARGE (A) NEG      WBC >100 (H) 0 - 4 /hpf    RBC 20-50 0 - 5 /hpf    Epithelial cells FEW FEW /lpf    Bacteria 2+ (A) NEG /hpf    UA:UC IF INDICATED URINE CULTURE ORDERED (A) CNI     CULTURE, MRSA    Collection Time: 05/16/17  2:36 PM   Result Value Ref Range    Special Requests: NO SPECIAL REQUESTS      Culture result: MRSA PRESENT (A)      Culture result:            Screening of patient nares for MRSA is for surveillance purposes and, if positive, to facilitate isolation considerations in high risk settings. It is not intended for automatic decolonization interventions per se as regimens are not sufficiently effective to warrant routine use.    CULTURE, URINE    Collection Time: 05/16/17  2:36 PM   Result Value Ref Range    Special Requests: NO SPECIAL REQUESTS  Reflexed from [id number]        [place] Count >100,000  COLONIES/mL        Culture result: CHECKING FOR POSSIBLE  MIXED CULTURE       EKG, 12 LEAD, INITIAL    Collection Time: 05/16/17  3:58 PM   Result Value Ref Range    Ventricular Rate 79 BPM    Atrial Rate 79 BPM    P-R Interval 136 ms    QRS Duration 80 ms    Q-T Interval 352 ms    QTC Calculation (Bezet) 403 ms    Calculated P Axis 65 degrees    Calculated R Axis 30 degrees    Calculated T Axis 49 degrees    Diagnosis       Normal sinus rhythm  Normal ECG  When compared with ECG of 02-JUN-2016 15:17,  No significant change was found  Confirmed by Josefa Wynn MD., Sofial ([id number]) on 5/16/2017 8:28:27 PM         Assessment:     Solitary kidney, hydronephrosis. Plan:     Scheduled for cystoscopy, left retrograde, left ureteral stent change. Labs and EKG done per surgeon's orders. Lab and EKG results reviewed- unremarkable except H/H slightly low (10/7 and 34.4), also creatinine elevated (1.50), GFR decreased ( 35). UA triggered C&S- final results pending. MRSA swab +. Patient received escript for Mupirocin ointment per protocol- spoke with Kristel Jackson in surgeon's office. See progress note.      Leti Kovacs NP

## 2017-05-17 LAB
BACTERIA SPEC CULT: ABNORMAL
BACTERIA SPEC CULT: ABNORMAL
SERVICE CMNT-IMP: ABNORMAL

## 2017-05-18 RX ORDER — MUPIROCIN 20 MG/G
OINTMENT TOPICAL
Qty: 22 G | Refills: 0 | Status: SHIPPED | OUTPATIENT
Start: 2017-05-18 | End: 2017-09-06

## 2017-05-18 NOTE — PERIOP NOTES
Spoke with [place] in surgeon's office and per Dr. Mk Painting request pre op antibiotic changed to Vancomycin dose= 1000 mg. Checked with pharmacist and this is appropriate dose for impaired renal function ( solitary kidney and creatinine= 1.5). 5/24- Sweta Turner - Dr. Saran Kurtz wants order for Vancomycin discontinued and he will evaluate pre op antibiotic on day of surgery. Vancomycin order discontinued in [facility].

## 2017-05-18 NOTE — PROGRESS NOTES
Notification of Positive MRSA and Treatment Ordered by Preadmission Testing Nurse Practitioner      Patient Name:  Aidan Ledesma     MRN: [id number]    : 1955    Surgeon: Dr. Morena Gallardo    Date of surgery: 2017    Procedure: Cystoscopy /left retrogrades /left ureteral stent exchange    Allergies: Allergies   Allergen Reactions    Hydrocodone Nausea Only    Ibuprofen Unknown (comments)     Pt cannot take because of having only one kidney    Penicillins Rash     Tolerates Cephalexin        MRSA results: All Micro Results     Procedure Component Value Units Date/Time    MRSA CULTURE NARES [[id number]]  (Abnormal) Collected:  17 1436    Order Status:  Completed Specimen:  Nares Updated:  171     Special Requests: NO SPECIAL REQUESTS        Culture result: MRSA PRESENT (A)                    Treatment ordered: Bactroban ointment 2%- apply intranasal twice daily for 5 days    Date patient notified of results / treatment prescribed: 2017. Spoke with patient's caregiver- Rosendo west. She verbalizes understanding of instructions.    The patient's caregiver was instructed to add medication and date they began treatment to their \"Prior to Admission Medication\" list given to them in [address] [place] [address],

## 2017-05-21 LAB
BACTERIA SPEC CULT: ABNORMAL
BACTERIA SPEC CULT: ABNORMAL
CC UR VC: ABNORMAL
SERVICE CMNT-IMP: ABNORMAL

## 2017-05-23 DIAGNOSIS — E11.9 TYPE 2 DIABETES MELLITUS WITHOUT COMPLICATION (HCC): ICD-10-CM

## 2017-05-24 ENCOUNTER — ANESTHESIA EVENT (OUTPATIENT)
Dept: SURGERY | Age: 62
End: 2017-05-24
Payer: SELF-PAY

## 2017-05-24 DIAGNOSIS — E11.9 TYPE 2 DIABETES MELLITUS WITHOUT COMPLICATION, WITHOUT LONG-TERM CURRENT USE OF INSULIN (HCC): Primary | ICD-10-CM

## 2017-05-24 RX ORDER — PIOGLITAZONEHYDROCHLORIDE 30 MG/1
30 TABLET ORAL DAILY
Qty: 30 TAB | Refills: 11 | Status: SHIPPED | OUTPATIENT
Start: 2017-05-24 | End: 2017-12-06 | Stop reason: SDUPTHER

## 2017-05-25 ENCOUNTER — APPOINTMENT (OUTPATIENT)
Dept: GENERAL RADIOLOGY | Age: 62
End: 2017-05-25
Attending: UROLOGY
Payer: SELF-PAY

## 2017-05-25 ENCOUNTER — ANESTHESIA (OUTPATIENT)
Dept: SURGERY | Age: 62
End: 2017-05-25
Payer: SELF-PAY

## 2017-05-25 ENCOUNTER — HOSPITAL ENCOUNTER (OUTPATIENT)
Age: 62
Setting detail: OUTPATIENT SURGERY
Discharge: HOME | End: 2017-05-25
Attending: UROLOGY | Admitting: UROLOGY
Payer: SELF-PAY

## 2017-05-25 VITALS
DIASTOLIC BLOOD PRESSURE: 66 MMHG | OXYGEN SATURATION: 100 % | SYSTOLIC BLOOD PRESSURE: 125 MMHG | WEIGHT: 149.91 LBS | BODY MASS INDEX: 28.3 KG/M2 | HEART RATE: 84 BPM | HEIGHT: 61 IN | RESPIRATION RATE: 17 BRPM | TEMPERATURE: 97.8 F

## 2017-05-25 LAB
GLUCOSE BLD STRIP.AUTO-MCNC: 139 MG/DL (ref 65–100)
GLUCOSE BLD STRIP.AUTO-MCNC: 169 MG/DL (ref 65–100)
SERVICE CMNT-IMP: ABNORMAL
SERVICE CMNT-IMP: ABNORMAL

## 2017-05-25 PROCEDURE — 74011000258 HC RX REV CODE- 258: Performed by: UROLOGY

## 2017-05-25 PROCEDURE — 77030020782 HC GWN BAIR PAWS FLX 3M -B

## 2017-05-25 PROCEDURE — 87086 URINE CULTURE/COLONY COUNT: CPT | Performed by: UROLOGY

## 2017-05-25 PROCEDURE — C1758 CATHETER, URETERAL: HCPCS | Performed by: UROLOGY

## 2017-05-25 PROCEDURE — 76010000138 HC OR TIME 0.5 TO 1 HR: Performed by: UROLOGY

## 2017-05-25 PROCEDURE — 77030019927 HC TBNG IRR CYSTO BAXT -A: Performed by: UROLOGY

## 2017-05-25 PROCEDURE — 77030020143 HC AIRWY LARYN INTUB CGAS -A: Performed by: ANESTHESIOLOGY

## 2017-05-25 PROCEDURE — 74011636320 HC RX REV CODE- 636/320: Performed by: UROLOGY

## 2017-05-25 PROCEDURE — 74011250636 HC RX REV CODE- 250/636

## 2017-05-25 PROCEDURE — 76060000032 HC ANESTHESIA 0.5 TO 1 HR: Performed by: UROLOGY

## 2017-05-25 PROCEDURE — 74011250636 HC RX REV CODE- 250/636: Performed by: ANESTHESIOLOGY

## 2017-05-25 PROCEDURE — 82962 GLUCOSE BLOOD TEST: CPT

## 2017-05-25 PROCEDURE — C2617 STENT, NON-COR, TEM W/O DEL: HCPCS | Performed by: UROLOGY

## 2017-05-25 PROCEDURE — 76210000063 HC OR PH I REC FIRST 0.5 HR: Performed by: UROLOGY

## 2017-05-25 PROCEDURE — 77030018832 HC SOL IRR H20 ICUM -A: Performed by: UROLOGY

## 2017-05-25 PROCEDURE — 76000 FLUOROSCOPY <1 HR PHYS/QHP: CPT

## 2017-05-25 PROCEDURE — 76210000020 HC REC RM PH II FIRST 0.5 HR: Performed by: UROLOGY

## 2017-05-25 PROCEDURE — 74011250636 HC RX REV CODE- 250/636: Performed by: UROLOGY

## 2017-05-25 PROCEDURE — 74011000250 HC RX REV CODE- 250

## 2017-05-25 DEVICE — URETERAL STENT
Type: IMPLANTABLE DEVICE | Site: URETER | Status: FUNCTIONAL
Brand: CONTOUR™

## 2017-05-25 RX ORDER — ONDANSETRON 2 MG/ML
4 INJECTION INTRAMUSCULAR; INTRAVENOUS AS NEEDED
Status: DISCONTINUED | OUTPATIENT
Start: 2017-05-25 | End: 2017-05-25 | Stop reason: HOSPADM

## 2017-05-25 RX ORDER — SODIUM CHLORIDE 0.9 % (FLUSH) 0.9 %
5-10 SYRINGE (ML) INJECTION AS NEEDED
Status: DISCONTINUED | OUTPATIENT
Start: 2017-05-25 | End: 2017-05-25 | Stop reason: HOSPADM

## 2017-05-25 RX ORDER — LIDOCAINE HYDROCHLORIDE 10 MG/ML
0.1 INJECTION, SOLUTION EPIDURAL; INFILTRATION; INTRACAUDAL; PERINEURAL AS NEEDED
Status: DISCONTINUED | OUTPATIENT
Start: 2017-05-25 | End: 2017-05-25 | Stop reason: HOSPADM

## 2017-05-25 RX ORDER — MIDAZOLAM HYDROCHLORIDE 1 MG/ML
INJECTION, SOLUTION INTRAMUSCULAR; INTRAVENOUS AS NEEDED
Status: DISCONTINUED | OUTPATIENT
Start: 2017-05-25 | End: 2017-05-25 | Stop reason: HOSPADM

## 2017-05-25 RX ORDER — SODIUM CHLORIDE, SODIUM LACTATE, POTASSIUM CHLORIDE, CALCIUM CHLORIDE 600; 310; 30; 20 MG/100ML; MG/100ML; MG/100ML; MG/100ML
INJECTION, SOLUTION INTRAVENOUS
Status: DISCONTINUED | OUTPATIENT
Start: 2017-05-25 | End: 2017-05-25 | Stop reason: HOSPADM

## 2017-05-25 RX ORDER — DIPHENHYDRAMINE HYDROCHLORIDE 50 MG/ML
12.5 INJECTION, SOLUTION INTRAMUSCULAR; INTRAVENOUS AS NEEDED
Status: DISCONTINUED | OUTPATIENT
Start: 2017-05-25 | End: 2017-05-25 | Stop reason: HOSPADM

## 2017-05-25 RX ORDER — ONDANSETRON 2 MG/ML
INJECTION INTRAMUSCULAR; INTRAVENOUS AS NEEDED
Status: DISCONTINUED | OUTPATIENT
Start: 2017-05-25 | End: 2017-05-25 | Stop reason: HOSPADM

## 2017-05-25 RX ORDER — SODIUM CHLORIDE, SODIUM LACTATE, POTASSIUM CHLORIDE, CALCIUM CHLORIDE 600; 310; 30; 20 MG/100ML; MG/100ML; MG/100ML; MG/100ML
100 INJECTION, SOLUTION INTRAVENOUS CONTINUOUS
Status: DISCONTINUED | OUTPATIENT
Start: 2017-05-25 | End: 2017-05-25 | Stop reason: HOSPADM

## 2017-05-25 RX ORDER — SODIUM CHLORIDE 0.9 % (FLUSH) 0.9 %
5-10 SYRINGE (ML) INJECTION EVERY 8 HOURS
Status: DISCONTINUED | OUTPATIENT
Start: 2017-05-25 | End: 2017-05-25 | Stop reason: HOSPADM

## 2017-05-25 RX ORDER — HYDROMORPHONE HYDROCHLORIDE 1 MG/ML
.25-1 INJECTION, SOLUTION INTRAMUSCULAR; INTRAVENOUS; SUBCUTANEOUS
Status: DISCONTINUED | OUTPATIENT
Start: 2017-05-25 | End: 2017-05-25 | Stop reason: HOSPADM

## 2017-05-25 RX ORDER — PROPOFOL 10 MG/ML
INJECTION, EMULSION INTRAVENOUS AS NEEDED
Status: DISCONTINUED | OUTPATIENT
Start: 2017-05-25 | End: 2017-05-25 | Stop reason: HOSPADM

## 2017-05-25 RX ORDER — NITROFURANTOIN 25; 75 MG/1; MG/1
100 CAPSULE ORAL 2 TIMES DAILY
COMMUNITY
End: 2017-09-06

## 2017-05-25 RX ORDER — LIDOCAINE HYDROCHLORIDE 20 MG/ML
INJECTION, SOLUTION EPIDURAL; INFILTRATION; INTRACAUDAL; PERINEURAL AS NEEDED
Status: DISCONTINUED | OUTPATIENT
Start: 2017-05-25 | End: 2017-05-25 | Stop reason: HOSPADM

## 2017-05-25 RX ORDER — SODIUM CHLORIDE, SODIUM LACTATE, POTASSIUM CHLORIDE, CALCIUM CHLORIDE 600; 310; 30; 20 MG/100ML; MG/100ML; MG/100ML; MG/100ML
125 INJECTION, SOLUTION INTRAVENOUS CONTINUOUS
Status: DISCONTINUED | OUTPATIENT
Start: 2017-05-25 | End: 2017-05-25 | Stop reason: HOSPADM

## 2017-05-25 RX ORDER — FENTANYL CITRATE 50 UG/ML
INJECTION, SOLUTION INTRAMUSCULAR; INTRAVENOUS AS NEEDED
Status: DISCONTINUED | OUTPATIENT
Start: 2017-05-25 | End: 2017-05-25 | Stop reason: HOSPADM

## 2017-05-25 RX ORDER — CEFAZOLIN SODIUM IN 0.9 % NACL 2 G/50 ML
2 INTRAVENOUS SOLUTION, PIGGYBACK (ML) INTRAVENOUS ONCE
Status: DISCONTINUED | OUTPATIENT
Start: 2017-05-25 | End: 2017-05-25

## 2017-05-25 RX ADMIN — PROPOFOL 150 MG: 10 INJECTION, EMULSION INTRAVENOUS at 11:19

## 2017-05-25 RX ADMIN — LIDOCAINE HYDROCHLORIDE 60 MG: 20 INJECTION, SOLUTION EPIDURAL; INFILTRATION; INTRACAUDAL; PERINEURAL at 11:19

## 2017-05-25 RX ADMIN — SODIUM CHLORIDE, SODIUM LACTATE, POTASSIUM CHLORIDE, AND CALCIUM CHLORIDE 100 ML/HR: 600; 310; 30; 20 INJECTION, SOLUTION INTRAVENOUS at 10:56

## 2017-05-25 RX ADMIN — MIDAZOLAM HYDROCHLORIDE 2 MG: 1 INJECTION, SOLUTION INTRAMUSCULAR; INTRAVENOUS at 11:12

## 2017-05-25 RX ADMIN — FENTANYL CITRATE 25 MCG: 50 INJECTION, SOLUTION INTRAMUSCULAR; INTRAVENOUS at 11:24

## 2017-05-25 RX ADMIN — SODIUM CHLORIDE 1 G: 900 INJECTION, SOLUTION INTRAVENOUS at 11:23

## 2017-05-25 RX ADMIN — SODIUM CHLORIDE, SODIUM LACTATE, POTASSIUM CHLORIDE, CALCIUM CHLORIDE: 600; 310; 30; 20 INJECTION, SOLUTION INTRAVENOUS at 11:12

## 2017-05-25 RX ADMIN — ONDANSETRON 4 MG: 2 INJECTION INTRAMUSCULAR; INTRAVENOUS at 11:26

## 2017-05-25 NOTE — ANESTHESIA POSTPROCEDURE EVALUATION
Post-Anesthesia Evaluation and Assessment    Patient: Dang Robison MRN: [id number]  SSN: [id number]    YOB: 1955  Age: 65 y.o. Sex: female       Cardiovascular Function/Vital Signs  Visit Vitals    /65    Pulse 93    Temp 36.6 °C (97.8 °F)    Resp 18    Ht 5' 1\" (1.549 m)    Wt 68 kg (149 lb 14.6 oz)    SpO2 99%    BMI 28.33 kg/m2       Patient is status post general anesthesia for Procedure(s):  CYSTOSCOPY, LEFT RETROGRADE, LEFT URETERAL STENT CHANGE. Nausea/Vomiting: None    Postoperative hydration reviewed and adequate. Pain:  Pain Scale 1: Numeric (0 - 10) (05/25/17 1211)  Pain Intensity 1: 0 (05/25/17 1211)   Managed    Neurological Status:   Neuro (WDL): Exceptions to WDL (05/25/17 1211)  Neuro  Neurologic State: Alert;Eyes open spontaneously (05/25/17 1211)  Orientation Level: Oriented to person;Oriented to place;Oriented to situation (05/25/17 1211)  Cognition: Follows commands (05/25/17 1211)  Speech: Clear (05/25/17 1211)  LUE Motor Response: Purposeful;Spontaneous  (05/25/17 1211)  LLE Motor Response: Purposeful;Spontaneous  (05/25/17 1211)  RUE Motor Response: Purposeful;Spontaneous  (05/25/17 1211)  RLE Motor Response: Purposeful;Spontaneous  (05/25/17 1211)   At baseline    Mental Status and Level of Consciousness: Arousable    Pulmonary Status:   O2 Device: Room air (05/25/17 1211)   Adequate oxygenation and airway patent    Complications related to anesthesia: None    Post-anesthesia assessment completed.  No concerns    Signed By: Nano Carpenter MD     May 25, 2017

## 2017-05-25 NOTE — IP AVS SNAPSHOT
[address] 
[phone] Patient: Jody Gutierrez MRN: [id number] BSW:[birth year] You are allergic to the following Allergen Reactions Hydrocodone Nausea Only Ibuprofen Unknown (comments) Pt cannot take because of having only one kidney Penicillins Rash Tolerates Cephalexin Recent Documentation Height Weight BMI OB Status Smoking Status 1.549 m 68 kg 28.33 kg/m2 Hysterectomy Never Smoker Unresulted Labs Order Current Status CULTURE, URINE In process Emergency Contacts Name Discharge Info Relation Home Work Mobile AntolinNicole DISCHARGE CAREGIVER [3] Caregiver [phone] Dominic Turk [facility]) DISCHARGE CAREGIVER [3] Caregiver [[phone] CAROL Alfaro  Other Relative [6] [phone] About your hospitalization You were admitted on:  May 25, 2017 You last received care in the:  [facility] PACU You were discharged on:  May 25, 2017 Unit phone number:  [phone] Why you were hospitalized Your primary diagnosis was:  Not on File Providers Seen During Your Hospitalizations Provider Role Specialty Primary office phone Dixon Ríos MD Attending Provider Urology [phone] Your Primary Care Physician (PCP) Primary Care Physician Office Phone Office Fax [address] [facility], [phone] Follow-up Information Follow up With Details Comments Contact Info CARRIE Velázquez   [address] 
[phone] Your Appointments Monday June 26, 2017  2:00 PM EDT Follow Up with CARRIE Tavarez [facility] ([address]) [address] [place] [address]  
[phone] Current Discharge Medication List  
  
 CONTINUE these medications which have NOT CHANGED Dose & Instructions Dispensing Information Comments Morning Noon Evening Bedtime  
 aspirin delayed-release 81 mg tablet Your last dose was: Your next dose is:    
   
   
 Dose:  81 mg Take 81 mg by mouth nightly. Refills:  0  
     
   
   
   
  
 CRESTOR 5 mg tablet Generic drug:  rosuvastatin Your last dose was: Your next dose is:    
   
   
 Dose:  5 mg Take 5 mg by mouth daily. Refills:  0  
     
   
   
   
  
 cyanocobalamin 1,000 mcg tablet Your last dose was: Your next dose is:    
   
   
 Dose:  1000 mcg Take 1 Tab by mouth daily. For B12 Quantity:  180 Tab Refills:  1  
     
   
   
   
  
 glipiZIDE SR 2.5 mg CR tablet Commonly known as:  GLUCOTROL XL Your last dose was: Your next dose is:    
   
   
 Dose:  2.5 mg Take 1 Tab by mouth Daily (before breakfast). Quantity:  30 Tab Refills:  0 MACROBID 100 mg capsule Generic drug:  nitrofurantoin (macrocrystal-monohydrate) Your last dose was: Your next dose is:    
   
   
 Dose:  100 mg Take 100 mg by mouth two (2) times a day. Refills:  0  
     
   
   
   
  
 metoprolol tartrate 25 mg tablet Commonly known as:  LOPRESSOR Your last dose was: Your next dose is:    
   
   
 Dose:  12.5 mg Take 12.5 mg by mouth two (2) times a day. Refills:  0  
     
   
   
   
  
 multivitamin with iron tablet Your last dose was: Your next dose is:    
   
   
 Dose:  1 Tab Take 1 Tab by mouth daily. Refills:  0  
     
   
   
   
  
 mupirocin 2 % ointment Commonly known as:  [facility] Your last dose was: Your next dose is:    
   
   
 Apply pea size amount with Q tip into edge of each nostril. Use twice daily x 5 days Quantity:  22 g Refills:  0  
     
   
   
   
  
 pioglitazone 30 mg tablet Commonly known as:  ACTOS Your last dose was: Your next dose is:    
   
   
 Dose:  30 mg Take 1 Tab by mouth daily. Quantity:  30 Tab Refills:  11 sertraline 50 mg tablet Commonly known as:  ZOLOFT Your last dose was: Your next dose is:    
   
   
 take 1 tablet by mouth once daily BEFORE BREAKFAST Quantity:  90 Tab Refills:  3  
     
   
   
   
  
 tolterodine ER 2 mg ER capsule Commonly known as:  DETROL-LA Your last dose was: Your next dose is:    
   
   
 Dose:  2 mg Take 2 mg by mouth nightly. Refills:  0 Discharge Instructions None Discharge Instructions Attachments/References URETERAL STENT PLACEMENT : POST-OP (ENGLISH) Discharge Orders None Introducing [facility]! Michael Light introduces [employer] [facility] patient portal. Now you can access parts of your medical record, email your doctor's office, and request medication refills online. 1. In your internet browser, go to [email or web address] 2. Click on the First Time User? Click Here link in the Sign In box. You will see the New Member Sign Up page. 3. Enter your [employer] [facility] Access Code exactly as it appears below. You will not need to use this code after youve completed the sign-up process. If you do not sign up before the expiration date, you must request a new code. · [employer] Access Code: [email or web address] Expires: 6/25/2017  1:36 PM 
 
4. Enter the last four digits of your Social Security Number (xxxx) and Date of Birth (mm/dd/yyyy) as indicated and click Submit. You will be taken to the next sign-up page. 5. Create a [facility] ID. This will be your [employer] [facility] login ID and cannot be changed, so think of one that is secure and easy to remember. 6. Create a [employer] [facility] [facility] password. You can change your password at any time. 7. Enter your Password Reset Question and Answer.  This can be used at a later time if you forget your password. 8. Enter your e-mail address. You will receive e-mail notification when new information is available in [address] 9. Click Sign Up. You can now view and download portions of your medical record. 10. Click the Download Summary menu link to download a portable copy of your medical information. If you have questions, please visit the Frequently Asked Questions section of the [employer] website. Remember, [employer] is NOT to be used for urgent needs. For medical emergencies, dial 911. Now available from your iPhone and Android! General Information Please provide this summary of care documentation to your next provider. Patient Signature:  ____________________________________________________________ Date:  ____________________________________________________________  
  
[facility] Provider Signature:  ____________________________________________________________ Date:  ____________________________________________________________ More Information Ureteral Stent Placement: What to Expect at Home Your Recovery A ureteral (say \"you-REE-ter-ul\") stent is a thin, hollow tube that is placed in the ureter to help urine pass from the kidney into the bladder. Ureters are the tubes that connect the kidneys to the bladder. You may have a small amount of blood in your urine for 1 to 3 days after the procedure. While the stent is in place, you may have to urinate more often, feel a sudden need to urinate, or feel like you cannot completely empty your bladder. You may feel some pain when you urinate or do strenuous activity. You also may notice a small amount of blood in your urine after strenuous activities. These side effects usually do not prevent people from doing their normal daily activities. You may have a thin string coming out of your urethra.  Your urethra is the tube that carries urine from your bladder to outside your body. This string is attached to the stent. Try not to pull on the string. The doctor will use the string to pull out the stent when you no longer need it. After the procedure, urine may flow better from your kidneys to your bladder. A ureteral stent may be left in place for several days or for as long as several months. Your doctor will take it out when you no longer need it. This care sheet gives you a general idea about how long it will take for you to recover. But each person recovers at a different pace. Follow the steps below to get better as quickly as possible. How can you care for yourself at home? Activity · Rest when you feel tired. Getting enough sleep will help you recover. · Avoid strenuous activities, such as bicycle riding, jogging, weight lifting, or aerobic exercise, until your doctor says it is okay. · Ask your doctor when you can drive again. · Most people are able to return to work the day after the procedure. If your work requires intense activity, you may feel pain in your kidney area or get tired easily. If this happens, you may need to do less strenuous activities while the stent is in. · Ask your doctor when it is okay for you to have sex. Diet · You can eat your normal diet. If your stomach is upset, try bland, low-fat foods like plain rice, broiled chicken, toast, and yogurt. · Drink plenty of fluids (unless your doctor tells you not to). Medicines · Your doctor will tell you if and when you can restart your medicines. He or she will also give you instructions about taking any new medicines. · If you take blood thinners, such as warfarin (Coumadin), clopidogrel (Plavix), or aspirin, be sure to talk to your doctor. He or she will tell you if and when to start taking those medicines again. Make sure that you understand exactly what your doctor wants you to do. · Be safe with medicines. Take pain medicines exactly as directed. ¨ If the doctor gave you a prescription medicine for pain, take it as prescribed. ¨ If you are not taking a prescription pain medicine, ask your doctor if you can take an over-the-counter medicine. · If you think your pain medicine is making you sick to your stomach: 
¨ Take your medicine after meals (unless your doctor has told you not to). ¨ Ask your doctor for a different pain medicine. · If your doctor prescribed antibiotics, take them as directed. Do not stop taking them just because you feel better. You need to take the full course of antibiotics. Follow-up care is a key part of your treatment and safety. Be sure to make and go to all appointments, and call your doctor if you are having problems. It's also a good idea to know your test results and keep a list of the medicines you take. When should you call for help? Call 911 anytime you think you may need emergency care. For example, call if: 
· You passed out (lost consciousness). · You have severe trouble breathing. · You have sudden chest pain and shortness of breath, or you cough up blood. · You have severe belly pain. Call your doctor now or seek immediate medical care if: · Part or all of the stent comes out of your urethra. · You have pain that does not get better after you take pain medicine. · You have symptoms of a urinary infection. For example: ¨ You have blood or pus in your urine. ¨ You have pain in your back just below your rib cage. This is called flank pain. ¨ You have a fever, chills, or body aches. ¨ It hurts to urinate. ¨ You have groin or belly pain. · You cannot control when you urinate, or you leak urine. Watch closely for changes in your health, and be sure to contact your doctor if you have any problems. Where can you learn more? Go to http://antoni-jacqui.info/.  
Enter Z277 in the search box to learn more about \"Ureteral Stent Placement: What to Expect at Home. \" Current as of: August 12, 2016 Content Version: 11.2 © 7671-6270 [employer]. Care instructions adapted under license by [employer] (which disclaims liability or warranty for this information). If you have questions about a medical condition or this instruction, always ask your healthcare professional. [facility] any warranty or liability for your use of this information. DISCHARGE SUMMARY from your Nurse The following personal items collected during your admission are returned to you:  
Dental Appliance: Dental Appliances: Uppers Vision: Visual Aid: Glasses Hearing Aid:   
Jewelry: Jewelry: None Clothing: Clothing: Other (comment) (clothes to locker) Other Valuables: Other Valuables: Shaheed cMgill (given to care taker) Valuables sent to safe:   
 
PATIENT INSTRUCTIONS: 
 
After general anesthesia or intravenous sedation, for 24 hours or while taking prescription Narcotics: · Limit your activities · Do not drive and operate hazardous machinery · Do not make important personal or business decisions · Do  not drink alcoholic beverages · If you have not urinated within 8 hours after discharge, please contact your surgeon on call. Report the following to your surgeon: 
· Excessive pain, swelling, redness or odor of or around the surgical area · Temperature over 100.5 · Nausea and vomiting lasting longer than 4 hours or if unable to take medications · Any signs of decreased circulation or nerve impairment to extremity: change in color, persistent  numbness, tingling, coldness or increase pain · Any questions [address] Breathing deep and coughing are very important exercises to do after surgery. Deep breathing and coughing open the little air tubes and air sacks in your lungs. You take deep breaths every day.   You may not even notice - it is just something you do when you sigh or yawn. It is a natural exercise you do to keep these air passages open. After surgery, take deep breaths and cough, on purpose. Coughing and deep breathing help prevent bronchitis and pneumonia after surgery. If you had chest or belly surgery, use a pillow as a \"hug esther\" and hold it tightly to your chest or belly when you cough. DIRECTIONS: 
6. Take 10 to 15 slow deep breaths every hour while awake. 7. Breathe in deeply, and hold it for 2 seconds. 8. Exhale slowly through puckered lips, like blowing up a balloon. 9. After every 4th or 5th deep breath, hug your pillow to your chest or belly and give a hard, deep cough. Yes, it will probably hurt. But doing this exercise is very important part of healing after surgery. Take your pain medicine to help you do this exercise without too much pain. IF YOU HAVE BEEN DIAGNOSED WITH SLEEP APNEA, PLEASE USE YOUR SLEEIFP APNEA DEVICE OR CPAP MACHINE WHEN YOU INTEND TO NAP AFTER TAKING PAIN MEDICATION. Ankle Pumps Ankle pumps increase the circulation of oxygenated blood to your lower extremities and decrease your risk for circulation problems such as blood clots. They also stretch the muscles, tendons and ligaments in your foot and ankle, and prevent joint contracture in the ankle and foot, especially after surgeries on the legs. It is important to do ankle pump exercises regularly after surgery because immobility increases your risk for developing a blood clot. Your doctor may also have you take an Aspirin for the next few days as well. If your doctor did not ask you to take an Aspirin, consult with him before starting Aspirin therapy on your own. Slowly point your foot forward, feeling the muscles on the top of your lower leg stretch, and hold this position for 5 seconds.   
 
          
 
Next, pull your foot back toward you as far as possible, stretching the calf muscles, and hold that position for 5 seconds. Repeat with the other foot. Perform 10 repetitions every hour while awake for both ankles if possible (down and then up with the foot once is one repetition). You should feel gentle stretching of the muscles in your lower leg when doing this exercise. If you feel pain, or your range of motion is limited, don't  Push too hard. Only go the limit your joint and muscles will let you go. If you have increasing pain, progressively worsening leg warmth or swelling, STOP the exercise and call your doctor. Below is information about the medications your doctor is prescribing after your visit: 
 

## 2017-05-25 NOTE — OP NOTES
[facility]   [address], [address]   OP NOTE       Name:  Benny Pérez   MR#:  [id number]   :  1955   Account #:  [de-identified]    Surgery Date:  2017   Date of Adm:  2017       PREOPERATIVE DIAGNOSIS: Solitary left kidney ureteral stricture,   hydronephrosis, UTI. POSTOPERATIVE DIAGNOSES: Solitary left kidney, ureteral stricture,   hydronephrosis, UTI. PROCEDURES PERFORMED:   1. Cystoscopy. 2. Left retrograde pyelogram.   3. Left stent removal and replacement 6 x 22 stent. SURGEON: Feliciano Cagle MD.    ANESTHESIA: General.    INDICATIONS: The patient is well known to us with a solitary left   kidney, strictured ureter, undergoes regular stent changes, presents for   today. Has UTI being treated. Creatinine is stable at 1.5. FINDINGS AT TIME OF PROCEDURE:   1. Mild hydronephrotic drip of clear urine. 2. Cloudy urine in the bladder obtained for culture. ESTIMATED BLOOD LOSS: Zero,     SPECIMENS: Sent urine for culture. DESCRIPTION OF PROCEDURE: After consent was obtained, the   patient was taken to the operating room. After adequate anesthesia   was obtained, she was prepped and draped in lithotomy position. A   rigid cystoscope was inserted in the bladder. There was some   inflammation and a little bit of debris in the bladder. I did obtain urine   for culture. I then grasped the stent, pulled it out the meatus, cut then   end, and then passed a Bentson wire up into the region of the renal   pelvis. Over that an opening catheter was placed. This was a   hydronephrotic drip. I withdrew about 25 mL from the renal pelvis. I   then shot a gentle retrograde outlining the collecting system. I then   replaced the Bentson wire. I then backloaded the cystoscope over the   wire passed a 6 x 22 stent. Proximal end coiled in the upper pole calyx   and distal end coiled within the bladder.  The bladder was then drained and the scope withdrawn. She was taken to the recovery room in   stable condition. PLAN: He is going to be discharged later today. She is going to   complete her antibiotic therapy. Follow up on intraoperative urine   cultures, adjust antibiotics if needed. Otherwise, I think we need to   change her stent in about 4 months. She did have a mild   hydronephrotic drip, but creatinine was fairly well preserved.         Link Ojeda MD MM / Boubacar.Push   D:  05/25/2017   11:52   T:  05/25/2017   12:10   Job #:  [id number]

## 2017-05-25 NOTE — PERIOP NOTES
Pt. And caregiver Abel Dsouza given copy of discharge instructions. Verbalized understanding. 12:58 PM  Pt. Escorted via wheelchair to entrance, caregiver driving and pt. Discharged into her care.

## 2017-05-25 NOTE — H&P (VIEW-ONLY)
PAT Pre-Op History & Physical    Patient: Adán Tejeda                  MRN: [id number]          SSN: [id number]  YOB: 1955          Age: 65 y.o. Sex: female                Subjective:   Patient is a 65 y.o.  female who presents with history of left hydronephrosis (attributed to narrowing of the ureter secondary to radiation therapy for cervical cancer). Has solitary kidney due to right nephrectomy in 1998. Requires periodic cystoscopies and stent exchange. Last stent excahang/cysto done 1/10/2017 when the patient was hospitalized for UTI/ IV antibiotics. Patient denies any discomfort at this time related to the stent or with urination. The patient was evaluated in the surgeon's office and it was determined that the most appropriate plan of care is to proceed with surgical intervention. Patient's PCP CARRIE Wilkerson      Past Medical History:   Diagnosis Date    Anemia     Chronic kidney disease     stage 3    CKD (chronic kidney disease), stage II     only has one kidney (on right). CKD stage III per lab results as far back as 2010.  Coronary artery disease 9/17/2014    A. Echo (9/16/14):  EF 45% with mid-distal septal/anterior HK, DD.   PASP 37.    Coronary artery disease     MI 9/2014    Depression     Diabetes ([address]     DM type 2 causing renal disease ([address]     History of nephrectomy     Right kidney    Hx MRSA infection     had negative swab [id number] but + swab on 5/16/2017    Hyperlipidemia     Hypertension     Mental retardation     Nephrolithiasis     hx    Uterine cancer ([address]     hx    UTI (lower urinary tract infection)     Vitamin D deficiency       Past Surgical History:   Procedure Laterality Date    HX CHOLECYSTECTOMY      HX DILATION AND CURETTAGE      HX HYSTERECTOMY      HX ORTHOPAEDIC      ORIF left arm, hx of fracture    HX ORTHOPAEDIC      Right ankle fx repair    HX OTHER SURGICAL  9/2014    I&D right leg wound    HX UROLOGICAL  1998    right nephrectomy    HX UROLOGICAL Left     multiple renal stents      Prior to Admission medications    Medication Sig Start Date End Date Taking? Authorizing Provider   rosuvastatin (CRESTOR) 5 mg tablet Take 5 mg by mouth daily. Yes Historical Provider   sertraline (ZOLOFT) 50 mg tablet take 1 tablet by mouth once daily BEFORE BREAKFAST 5/10/17  Yes CARRIE Zeng   glipiZIDE SR (GLUCOTROL XL) 2.5 mg CR tablet Take 1 Tab by mouth Daily (before breakfast). 1/16/17  Yes Evin Nunn MD   metoprolol tartrate (LOPRESSOR) 25 mg tablet Take 12.5 mg by mouth two (2) times a day. Yes Historical Provider   pioglitazone (ACTOS) 30 mg tablet Take 30 mg by mouth daily. Yes Historical Provider   aspirin delayed-release 81 mg tablet Take 81 mg by mouth nightly. Yes Historical Provider   tolterodine ER (DETROL-LA) 2 mg ER capsule Take 2 mg by mouth nightly. Yes Historical Provider   multivitamin with iron tablet Take 1 Tab by mouth daily. Yes Historical Provider   cyanocobalamin 1,000 mcg tablet Take 1 Tab by mouth daily. For B12 2/24/14  Yes Jude Chu NP     Current Outpatient Prescriptions   Medication Sig    rosuvastatin (CRESTOR) 5 mg tablet Take 5 mg by mouth daily.  sertraline (ZOLOFT) 50 mg tablet take 1 tablet by mouth once daily BEFORE BREAKFAST    glipiZIDE SR (GLUCOTROL XL) 2.5 mg CR tablet Take 1 Tab by mouth Daily (before breakfast).  metoprolol tartrate (LOPRESSOR) 25 mg tablet Take 12.5 mg by mouth two (2) times a day.  pioglitazone (ACTOS) 30 mg tablet Take 30 mg by mouth daily.  aspirin delayed-release 81 mg tablet Take 81 mg by mouth nightly.  tolterodine ER (DETROL-LA) 2 mg ER capsule Take 2 mg by mouth nightly.  multivitamin with iron tablet Take 1 Tab by mouth daily.  cyanocobalamin 1,000 mcg tablet Take 1 Tab by mouth daily. For B12     No current facility-administered medications for this encounter.        Allergies   Allergen Reactions    Hydrocodone Nausea Only    Ibuprofen Unknown (comments)     Pt cannot take because of having only one kidney    Penicillins Rash     Tolerates Cephalexin       Social History   Substance Use Topics    Smoking status: Never Smoker    Smokeless tobacco: Never Used    Alcohol use No      History   Drug Use No     Family History   Problem Relation Age of Onset    Mental Retardation Mother     Heart Disease Mother     Breast Cancer Sister     No Known Problems Brother     Malignant Hyperthermia Neg Hx     Pseudocholinesterase Deficiency Neg Hx     Delayed Awakening Neg Hx     Post-op Nausea/Vomiting Neg Hx     Emergence Delirium Neg Hx     Post-op Cognitive Dysfunction Neg Hx          Review of Systems    Patient denies difficulty swallowing, mouth sores, or loose teeth. Patient denies any recent dental procedures or any planned prior to surgery. Patient denies chest pain, tightness, pain radiating down left arm, palpitations. Denies dizziness, visual disturbances, or lightheadedness. Patient denies shortness of breath, wheezing, cough, fever, or chills. Patient denies diarrhea, constipation, or abdominal pain. Patient denies urinary problems including dysuria, hesitancy, urgency, or incontinence. Denies skin breakdown, rashes, insect bites or open area. Objective:   Patient Vitals for the past 24 hrs:   Temp Pulse Resp BP SpO2   17 1454 98.4 °F (36.9 °C) 96 17 135/66 97 %     Temp (24hrs), Av.4 °F (36.9 °C), Min:98.4 °F (36.9 °C), Max:98.4 °F (36.9 °C)    Body mass index is 28.78 kg/(m^2). Wt Readings from Last 1 Encounters:   17 69.1 kg (152 lb 5.4 oz)        Physical Exam:     General: Pleasant,  cooperative, no apparent distress, appears stated age. Eyes: Conjunctivae/corneas clear. EOMs intact. Nose: Nares normal.   Mouth/Throat: Lips, mucosa, and tongue normal. Upper dentures in place- no lower teeth-gums normal.   Neck: Supple, symmetrical, trachea midline.     Back: Symmetric. No CVA tenderness. Lungs: Clear to auscultation bilaterally. Heart: Regular rate and rhythm, S1, S2 normal. No murmur, click, rub or gallop. Abdomen: Soft, non-tender. Bowel sounds normal. No distention. Musculoskeletal:  Unremarkable. Extremities:  Extremities normal, atraumatic, no cyanosis or edema. Calves                                 supple, non tender to palpation. Pulses: 2+ and symmetric bilateral upper extremities. Cap. refill <2 seconds   Skin: Skin color, texture, turgor normal.  No rashes or lesions. Neurologic: CN II-XII grossly intact. Alert and oriented x3. Labs:   Recent Results (from the past 72 hour(s))   CBC WITH AUTOMATED DIFF    Collection Time: 05/16/17  2:36 PM   Result Value Ref Range    WBC 7.4 3.6 - 11.0 K/uL    RBC 3.84 3.80 - 5.20 M/uL    HGB 10.7 (L) 11.5 - 16.0 g/dL    HCT 34.4 (L) 35.0 - 47.0 %    MCV 89.6 80.0 - 99.0 FL    MCH 27.9 26.0 - 34.0 PG    MCHC 31.1 30.0 - 36.5 g/dL    RDW 15.8 (H) 11.5 - 14.5 %    PLATELET 054 734 - 638 K/uL    NEUTROPHILS 73 32 - 75 %    LYMPHOCYTES 18 12 - 49 %    MONOCYTES 7 5 - 13 %    EOSINOPHILS 1 0 - 7 %    BASOPHILS 1 0 - 1 %    ABS. NEUTROPHILS 5.4 1.8 - 8.0 K/UL    ABS. LYMPHOCYTES 1.3 0.8 - 3.5 K/UL    ABS. MONOCYTES 0.5 0.0 - 1.0 K/UL    ABS. EOSINOPHILS 0.1 0.0 - 0.4 K/UL    ABS. BASOPHILS 0.0 0.0 - 0.1 K/UL   METABOLIC PANEL, COMPREHENSIVE    Collection Time: 05/16/17  2:36 PM   Result Value Ref Range    Sodium 137 136 - 145 mmol/L    Potassium 4.5 3.5 - 5.1 mmol/L    Chloride 101 97 - 108 mmol/L    CO2 23 21 - 32 mmol/L    Anion gap 13 5 - 15 mmol/L    Glucose 171 (H) 65 - 100 mg/dL    BUN 42 (H) 6 - 20 MG/DL    Creatinine 1.50 (H) 0.55 - 1.02 MG/DL    BUN/Creatinine ratio 28 (H) 12 - 20      GFR est AA 43 (L) >60 ml/min/1.73m2    GFR est non-AA 35 (L) >60 ml/min/1.73m2    Calcium 8.3 (L) 8.5 - 10.1 MG/DL    Bilirubin, total 0.2 0.2 - 1.0 MG/DL    ALT (SGPT) 20 12 - 78 U/L    AST (SGOT) 19 15 - 37 U/L    Alk. phosphatase 138 (H) 45 - 117 U/L    Protein, total 7.6 6.4 - 8.2 g/dL    Albumin 3.7 3.5 - 5.0 g/dL    Globulin 3.9 2.0 - 4.0 g/dL    A-G Ratio 0.9 (L) 1.1 - 2.2     URINALYSIS W/ REFLEX CULTURE    Collection Time: 05/16/17  2:36 PM   Result Value Ref Range    Color YELLOW/STRAW      Appearance TURBID (A) CLEAR      Specific gravity 1.011 1.003 - 1.030      pH (UA) 6.0 5.0 - 8.0      Protein 30 (A) NEG mg/dL    Glucose NEGATIVE  NEG mg/dL    Ketone NEGATIVE  NEG mg/dL    Bilirubin NEGATIVE  NEG      Blood MODERATE (A) NEG      Urobilinogen 0.2 0.2 - 1.0 EU/dL    Nitrites POSITIVE (A) NEG      Leukocyte Esterase LARGE (A) NEG      WBC >100 (H) 0 - 4 /hpf    RBC 20-50 0 - 5 /hpf    Epithelial cells FEW FEW /lpf    Bacteria 2+ (A) NEG /hpf    UA:UC IF INDICATED URINE CULTURE ORDERED (A) CNI     CULTURE, MRSA    Collection Time: 05/16/17  2:36 PM   Result Value Ref Range    Special Requests: NO SPECIAL REQUESTS      Culture result: MRSA PRESENT (A)      Culture result:            Screening of patient nares for MRSA is for surveillance purposes and, if positive, to facilitate isolation considerations in high risk settings. It is not intended for automatic decolonization interventions per se as regimens are not sufficiently effective to warrant routine use.    CULTURE, URINE    Collection Time: 05/16/17  2:36 PM   Result Value Ref Range    Special Requests: NO SPECIAL REQUESTS  Reflexed from [id number]        [place] Count >100,000  COLONIES/mL        Culture result: CHECKING FOR POSSIBLE  MIXED CULTURE       EKG, 12 LEAD, INITIAL    Collection Time: 05/16/17  3:58 PM   Result Value Ref Range    Ventricular Rate 79 BPM    Atrial Rate 79 BPM    P-R Interval 136 ms    QRS Duration 80 ms    Q-T Interval 352 ms    QTC Calculation (Bezet) 403 ms    Calculated P Axis 65 degrees    Calculated R Axis 30 degrees    Calculated T Axis 49 degrees    Diagnosis       Normal sinus rhythm  Normal ECG  When compared with ECG of 02-JUN-2016 15:17,  No significant change was found  Confirmed by An Marcum MD., Gregg ([id number]) on 5/16/2017 8:28:27 PM         Assessment:     Solitary kidney, hydronephrosis. Plan:     Scheduled for cystoscopy, left retrograde, left ureteral stent change. Labs and EKG done per surgeon's orders. Lab and EKG results reviewed- unremarkable except H/H slightly low (10/7 and 34.4), also creatinine elevated (1.50), GFR decreased ( 35). UA triggered C&S- final results pending. MRSA swab +. Patient received escript for Mupirocin ointment per protocol- spoke with Deloris Garcia in surgeon's office. See progress note.      Bill Gregory NP

## 2017-05-25 NOTE — IP AVS SNAPSHOT
Current Discharge Medication List  
  
CONTINUE these medications which have NOT CHANGED Dose & Instructions Dispensing Information Comments Morning Noon Evening Bedtime  
 aspirin delayed-release 81 mg tablet Your last dose was: Your next dose is:    
   
   
 Dose:  81 mg Take 81 mg by mouth nightly. Refills:  0  
     
   
   
   
  
 CRESTOR 5 mg tablet Generic drug:  rosuvastatin Your last dose was: Your next dose is:    
   
   
 Dose:  5 mg Take 5 mg by mouth daily. Refills:  0  
     
   
   
   
  
 cyanocobalamin 1,000 mcg tablet Your last dose was: Your next dose is:    
   
   
 Dose:  1000 mcg Take 1 Tab by mouth daily. For B12 Quantity:  180 Tab Refills:  1  
     
   
   
   
  
 glipiZIDE SR 2.5 mg CR tablet Commonly known as:  GLUCOTROL XL Your last dose was: Your next dose is:    
   
   
 Dose:  2.5 mg Take 1 Tab by mouth Daily (before breakfast). Quantity:  30 Tab Refills:  0 MACROBID 100 mg capsule Generic drug:  nitrofurantoin (macrocrystal-monohydrate) Your last dose was: Your next dose is:    
   
   
 Dose:  100 mg Take 100 mg by mouth two (2) times a day. Refills:  0  
     
   
   
   
  
 metoprolol tartrate 25 mg tablet Commonly known as:  LOPRESSOR Your last dose was: Your next dose is:    
   
   
 Dose:  12.5 mg Take 12.5 mg by mouth two (2) times a day. Refills:  0  
     
   
   
   
  
 multivitamin with iron tablet Your last dose was: Your next dose is:    
   
   
 Dose:  1 Tab Take 1 Tab by mouth daily. Refills:  0  
     
   
   
   
  
 mupirocin 2 % ointment Commonly known as:  [facility] Your last dose was: Your next dose is:    
   
   
 Apply pea size amount with Q tip into edge of each nostril. Use twice daily x 5 days Quantity:  22 g Refills:  0 pioglitazone 30 mg tablet Commonly known as:  ACTOS Your last dose was: Your next dose is:    
   
   
 Dose:  30 mg Take 1 Tab by mouth daily. Quantity:  30 Tab Refills:  11 sertraline 50 mg tablet Commonly known as:  ZOLOFT Your last dose was: Your next dose is:    
   
   
 take 1 tablet by mouth once daily BEFORE BREAKFAST Quantity:  90 Tab Refills:  3  
     
   
   
   
  
 tolterodine ER 2 mg ER capsule Commonly known as:  DETROL-LA Your last dose was: Your next dose is:    
   
   
 Dose:  2 mg Take 2 mg by mouth nightly. Refills:  0

## 2017-05-25 NOTE — BRIEF OP NOTE
BRIEF OPERATIVE NOTE    Date of Procedure: 5/25/2017   Preoperative Diagnosis: SOLITARY KIDNEY, HYDRONEPHROSIS  Postoperative Diagnosis: SOLITARY KIDNEY, HYDRONEPHROSIS    Procedure(s):  CYSTOSCOPY, LEFT RETROGRADE, LEFT URETERAL STENT CHANGE  Surgeon(s) and Role:     * Bigg Vincent MD - Primary         Assistant Staff:       Surgical Staff:  Circ-1: Tana Mo RN  Circ-Relief: Rosalva Aguilar RN  Scrub Tech-1: Viktor Park  Scrub RN-2: April NEELA Navarro RN  Event Time In   Incision Start 1139   Incision Close 1147     Anesthesia: General   Estimated Blood Loss: 0  Specimens:   ID Type Source Tests Collected by Time Destination   1 : urine Urine-cystoscopy Urine, Cystoscopy URINE CX / Camille Garcia MD 5/25/2017 1140 Microbiology      Findings: mild hydro  Complications: 0  Implants:   Implant Name Type Inv.  Item Serial No.  Lot No. LRB No. Used Action   STENT URET FLX SFT [id number] -- CONTOUR PERCUFLEX - SN/A   STENT URET FLX SFT [id number] -- CONTOUR PERCUFLEX N/A [facility] [id number] Left 1 Implanted

## 2017-05-25 NOTE — ANESTHESIA PREPROCEDURE EVALUATION
Anesthetic History   No history of anesthetic complications            Review of Systems / Medical History  Patient summary reviewed, nursing notes reviewed and pertinent labs reviewed    Pulmonary  Within defined limits                 Neuro/Psych         Psychiatric history    Comments: Mental retardation Cardiovascular    Hypertension: well controlled          Past MI, CAD and hyperlipidemia  Pertinent negatives: No cardiac stents  Exercise tolerance: <4 METS     GI/Hepatic/Renal  Within defined limits             Comments: S/p nephrectomy Endo/Other    Diabetes: poorly controlled    Anemia     Other Findings              Physical Exam    Airway  Mallampati: II  TM Distance: < 4 cm  Neck ROM: normal range of motion   Mouth opening: Normal     Cardiovascular  Regular rate and rhythm,  S1 and S2 normal,  no murmur, click, rub, or gallop  Rhythm: regular  Rate: normal      Pertinent negatives: No murmur   Dental    Dentition: Edentulous and Full upper dentures     Pulmonary  Breath sounds clear to auscultation               Abdominal  GI exam deferred       Other Findings            Anesthetic Plan    ASA: 3  Anesthesia type: general          Induction: Intravenous  Anesthetic plan and risks discussed with: Patient

## 2017-05-25 NOTE — INTERVAL H&P NOTE
H&P Update: Sarah Dsouza was seen and examined. History and physical has been reviewed.  Significant clinical changes have occurred as noted:  Being rx for uti    Signed By: Pardeep Robledo MD     May 25, 2017 10:23 AM

## 2017-05-27 LAB
BACTERIA SPEC CULT: NORMAL
CC UR VC: NORMAL
SERVICE CMNT-IMP: NORMAL

## 2017-05-29 ENCOUNTER — PATIENT OUTREACH (OUTPATIENT)
Dept: FAMILY MEDICINE CLINIC | Age: 62
End: 2017-05-29

## 2017-06-26 ENCOUNTER — HOSPITAL ENCOUNTER (OUTPATIENT)
Dept: LAB | Age: 62
Discharge: HOME | End: 2017-06-26

## 2017-06-26 ENCOUNTER — OFFICE VISIT (OUTPATIENT)
Dept: FAMILY MEDICINE CLINIC | Age: 62
End: 2017-06-26

## 2017-06-26 VITALS — BODY MASS INDEX: 28.15 KG/M2 | DIASTOLIC BLOOD PRESSURE: 64 MMHG | SYSTOLIC BLOOD PRESSURE: 126 MMHG | WEIGHT: 149 LBS

## 2017-06-26 DIAGNOSIS — Z13.1 SCREENING FOR DIABETES MELLITUS: Primary | ICD-10-CM

## 2017-06-26 DIAGNOSIS — E11.9 TYPE 2 DIABETES MELLITUS WITHOUT COMPLICATION, WITHOUT LONG-TERM CURRENT USE OF INSULIN (HCC): ICD-10-CM

## 2017-06-26 LAB — GLUCOSE POC: NORMAL MG/DL

## 2017-06-26 PROCEDURE — 80053 COMPREHEN METABOLIC PANEL: CPT | Performed by: PHYSICIAN ASSISTANT

## 2017-06-26 PROCEDURE — 80061 LIPID PANEL: CPT | Performed by: PHYSICIAN ASSISTANT

## 2017-06-26 PROCEDURE — 83036 HEMOGLOBIN GLYCOSYLATED A1C: CPT | Performed by: PHYSICIAN ASSISTANT

## 2017-06-26 PROCEDURE — 84443 ASSAY THYROID STIM HORMONE: CPT | Performed by: PHYSICIAN ASSISTANT

## 2017-06-26 RX ORDER — ATORVASTATIN CALCIUM 10 MG/1
TABLET, FILM COATED ORAL
Refills: 0 | COMMUNITY
Start: 2017-05-02 | End: 2017-09-06 | Stop reason: SDUPTHER

## 2017-06-26 NOTE — PROGRESS NOTES
Coordination of Care  1. Have you been to the ER, urgent care clinic since your last visit? Hospitalized since your last visit? No    2. Have you seen or consulted any other health care providers outside of the [address] [place] [address] since your last visit? Include any pap smears or colon screening.  No    Medications  Medication Reconciliation Performed: no  Patient does need refills     Learning Assessment Complete? yes    Results for orders placed or performed in visit on 06/26/17   AMB POC GLUCOSE BLOOD, BY GLUCOSE MONITORING DEVICE   Result Value Ref Range    Glucose  non fasting mg/dL

## 2017-06-26 NOTE — PROGRESS NOTES
Assessment/Plan:    Concepción Montgomery was seen today for follow-up. Diagnoses and all orders for this visit:    Screening for diabetes mellitus  -     AMB POC GLUCOSE BLOOD, BY GLUCOSE MONITORING DEVICE    Pt agrees to labs today  Pt will call for appt at her eye specialist in [facility] - it has been about 1 1`/2 years since her last visit  Needs to order her detrol through PAP    Follow-up Disposition:  Return in about 3 months (around 9/26/2017). BETO Benitez expressed understanding of this plan. An AVS was printed and given to the patient.      ----------------------------------------------------------------------    Chief Complaint   Patient presents with    Follow-up       History of Present Illness:  reviewed all notes and labs in her chart since she was last seen here 3 months ago. She had scheduled ureteral stent change 5/30/17. She has been urinating normally since the change and has no dysuria. She has not run out of medication. She is taking meds as prescribed- recent change from crestor to lipitor due to cost of crestor. She is tolerating the lipitor well. She is watching her diet and exercising daily. She is happy to tell me that she has lost another 5 lbs with all of her healthy lifestyle changes! She states that her  and dog are doing well. Her last eye exam was around 12/15. She is due for eye exam and her caregiver will call for an appt at the same eye clinic that she went to before. I have asked for her to request an office visit note so that I can record that in her chart at the next visit. Pt states that her fasting blood sugars have been 104-105 every day. Past Medical History:   Diagnosis Date    Anemia     Chronic kidney disease     stage 3    CKD (chronic kidney disease), stage II     only has one kidney (on right). CKD stage III per lab results as far back as 2010.  Coronary artery disease 9/17/2014    A.   Echo (9/16/14):  EF 45% with mid-distal septal/anterior HK, DD. PASP 37.    Coronary artery disease     MI 9/2014    Depression     Diabetes ([address]     DM type 2 causing renal disease ([address]     History of nephrectomy     Right kidney    Hx MRSA infection     had negative swab [id number] but + swab on 5/16/2017    Hyperlipidemia     Hypertension     Mental retardation     Nephrolithiasis     hx    Uterine cancer ([address]     hx    UTI (lower urinary tract infection)     Vitamin D deficiency        Current Outpatient Prescriptions   Medication Sig Dispense Refill    atorvastatin (LIPITOR) 10 mg tablet   0    nitrofurantoin, macrocrystal-monohydrate, (MACROBID) 100 mg capsule Take 100 mg by mouth two (2) times a day.  pioglitazone (ACTOS) 30 mg tablet Take 1 Tab by mouth daily. 30 Tab 11    mupirocin (BACTROBAN) 2 % ointment Apply pea size amount with Q tip into edge of each nostril. Use twice daily x 5 days 22 g 0    sertraline (ZOLOFT) 50 mg tablet take 1 tablet by mouth once daily BEFORE BREAKFAST 90 Tab 3    glipiZIDE SR (GLUCOTROL XL) 2.5 mg CR tablet Take 1 Tab by mouth Daily (before breakfast). 30 Tab 0    metoprolol tartrate (LOPRESSOR) 25 mg tablet Take 12.5 mg by mouth two (2) times a day.  aspirin delayed-release 81 mg tablet Take 81 mg by mouth nightly.  tolterodine ER (DETROL-LA) 2 mg ER capsule Take 2 mg by mouth nightly.  multivitamin with iron tablet Take 1 Tab by mouth daily.  cyanocobalamin 1,000 mcg tablet Take 1 Tab by mouth daily.  For B12 180 Tab 1       Allergies   Allergen Reactions    Hydrocodone Nausea Only    Ibuprofen Unknown (comments)     Pt cannot take because of having only one kidney    Penicillins Rash     Tolerates Cephalexin        Social History   Substance Use Topics    Smoking status: Never Smoker    Smokeless tobacco: Never Used    Alcohol use No       Family History   Problem Relation Age of Onset    Mental Retardation Mother     Heart Disease Mother     Breast Cancer Sister     No Known Problems Brother     Malignant Hyperthermia Neg Hx     Pseudocholinesterase Deficiency Neg Hx     Delayed Awakening Neg Hx     Post-op Nausea/Vomiting Neg Hx     Emergence Delirium Neg Hx     Post-op Cognitive Dysfunction Neg Hx        Physical Exam:     Visit Vitals    /64 (BP 1 Location: Left arm, BP Patient Position: Sitting)    Wt 149 lb (67.6 kg)    BMI 28.15 kg/m2     Looks well, pleasant  A&Ox3  WDWN NAD  Respirations normal and non labored  Lab Results   Component Value Date/Time    Hemoglobin A1c 7.1 11/29/2016 07:29 AM     Lab Results   Component Value Date/Time    Sodium 137 05/16/2017 02:36 PM    Potassium 4.5 05/16/2017 02:36 PM    Chloride 101 05/16/2017 02:36 PM    CO2 23 05/16/2017 02:36 PM    Anion gap 13 05/16/2017 02:36 PM    Glucose 171 05/16/2017 02:36 PM    BUN 42 05/16/2017 02:36 PM    Creatinine 1.50 05/16/2017 02:36 PM    BUN/Creatinine ratio 28 05/16/2017 02:36 PM    GFR est AA 43 05/16/2017 02:36 PM    GFR est non-AA 35 05/16/2017 02:36 PM    Calcium 8.3 05/16/2017 02:36 PM    Bilirubin, total 0.2 05/16/2017 02:36 PM    AST (SGOT) 19 05/16/2017 02:36 PM    Alk.  phosphatase 138 05/16/2017 02:36 PM    Protein, total 7.6 05/16/2017 02:36 PM    Albumin 3.7 05/16/2017 02:36 PM    Globulin 3.9 05/16/2017 02:36 PM    A-G Ratio 0.9 05/16/2017 02:36 PM    ALT (SGPT) 20 05/16/2017 02:36 PM       Lab Results   Component Value Date/Time    TSH 2.62 06/24/2014 04:48 AM

## 2017-06-26 NOTE — PROGRESS NOTES
The following was performed at discharge station per provider:    AVS printed, reviewed with pt and given to pt and her caregiver who accompanied her. The caregiver requested that pt's Detrol be reordered through the PAP program.  RN called Mary Calabrese at [facility] and requested a new shipment for pt. Pt and caregiver were referred to the registrar to make an appt for follow up in 3 months. Pt's caregiver expressed understanding of the above information. Syeda Llanos

## 2017-06-27 LAB
ALBUMIN SERPL BCP-MCNC: 3.1 G/DL (ref 3.5–5)
ALBUMIN/GLOB SERPL: 0.8 {RATIO} (ref 1.1–2.2)
ALP SERPL-CCNC: 138 U/L (ref 45–117)
ALT SERPL-CCNC: 18 U/L (ref 12–78)
ANION GAP BLD CALC-SCNC: 10 MMOL/L (ref 5–15)
AST SERPL W P-5'-P-CCNC: 16 U/L (ref 15–37)
BILIRUB SERPL-MCNC: 0.3 MG/DL (ref 0.2–1)
BUN SERPL-MCNC: 35 MG/DL (ref 6–20)
BUN/CREAT SERPL: 25 (ref 12–20)
CALCIUM SERPL-MCNC: 8.9 MG/DL (ref 8.5–10.1)
CHLORIDE SERPL-SCNC: 110 MMOL/L (ref 97–108)
CHOLEST SERPL-MCNC: 162 MG/DL
CO2 SERPL-SCNC: 22 MMOL/L (ref 21–32)
CREAT SERPL-MCNC: 1.41 MG/DL (ref 0.55–1.02)
EST. AVERAGE GLUCOSE BLD GHB EST-MCNC: 146 MG/DL
GLOBULIN SER CALC-MCNC: 4 G/DL (ref 2–4)
GLUCOSE SERPL-MCNC: 101 MG/DL (ref 65–100)
HBA1C MFR BLD: 6.7 % (ref 4.2–6.3)
HDLC SERPL-MCNC: 41 MG/DL
HDLC SERPL: 4 {RATIO} (ref 0–5)
LDLC SERPL CALC-MCNC: 80.2 MG/DL (ref 0–100)
LIPID PROFILE,FLP: ABNORMAL
POTASSIUM SERPL-SCNC: 5 MMOL/L (ref 3.5–5.1)
PROT SERPL-MCNC: 7.1 G/DL (ref 6.4–8.2)
SODIUM SERPL-SCNC: 142 MMOL/L (ref 136–145)
TRIGL SERPL-MCNC: 204 MG/DL (ref ?–150)
TSH SERPL DL<=0.05 MIU/L-ACNC: 2.07 UIU/ML (ref 0.36–3.74)
VLDLC SERPL CALC-MCNC: 40.8 MG/DL

## 2017-06-30 ENCOUNTER — DOCUMENTATION ONLY (OUTPATIENT)
Dept: FAMILY MEDICINE CLINIC | Age: 62
End: 2017-06-30

## 2017-06-30 RX ORDER — TOLTERODINE 2 MG/1
2 CAPSULE, EXTENDED RELEASE ORAL
Qty: 30 CAP | Refills: 2
Start: 2017-06-30 | End: 2018-06-25 | Stop reason: ALTCHOICE

## 2017-06-30 NOTE — PROGRESS NOTES
Telephone call made to the patient's Caregiver, [facility], to let her know that we have received a bottle of PAP Detrol LA for Kj Olson.  Ildefonso Delarosa asked that it be delivered to them at the [facility] as it is too far for them to come to any other [facility] sites to pick it up. (07-24-17)Nanci Gonzalez RN

## 2017-06-30 NOTE — PROGRESS NOTES
Receipt of patient's PAP order of Detrol LA, 2mg, 1 bottle of 90 capsules, has been logged into the log book. Routing to the provider for dose confirmation and permission to deliver to the patient. Also need a current prescription in [facility] for this med please. Nanci Vera, RN  Tracking Events for the Last 12 Hours   24 Hours   48 Hours   No recent dispensing events. tolterodine ER (DETROL-LA) 2 mg ER capsule [[id number]]   Order Details   Dose: 2 mg Route: Oral Frequency: EVERY BEDTIME   Dispense Quantity:  -- Refills:  -- Fills Remaining:  --           Sig: Take 2 mg by mouth nightly.          Written Date:  -- Expiration Date:  -- Ordering Date:  01/13/17    Start Date:  -- End Date:  --            Ordering Provider:  -- NATA #:  -- NPI:  --    Authorizing Provider:  Historical Provider NATA #:  -- NPI:  --    Ordering User:  Dinora Castano. Kitty Marino Comments:  --          Quantity Remaining:  -- Quantity Filled:  --        Priority and Order Details   Priority Class      Routine Historical Med    Warnings History   No Interaction Warnings Shown    Order History   Historical Medication   Date/Time Action Taken User Additional Information   01/13/17 1949 Historical Med Noted Brandy Cheadle A. Winnie [place]    01/13/17 2002 Order for Admission Megha Chavez MD JZ [id number]: [id number]   01/16/17 1147 Resume at Discharge Tereso Flores MD    05/16/17 7700 [facility], RN    05/25/17 1008 Taking 8330 [facility], RN    05/25/17 1149 Resume at Discharge Shameka Jose MD    Medication Detail      Disp Refills Start End      tolterodine ER (DETROL-LA) 2 mg ER capsule        Sig - Route: Take 2 mg by mouth nightly.  - Oral    Class: Historical Med

## 2017-07-06 RX ORDER — PIOGLITAZONEHYDROCHLORIDE 30 MG/1
TABLET ORAL
Qty: 30 TAB | Refills: 11 | OUTPATIENT
Start: 2017-07-06

## 2017-07-31 ENCOUNTER — TELEPHONE (OUTPATIENT)
Dept: FAMILY MEDICINE CLINIC | Age: 62
End: 2017-07-31

## 2017-07-31 DIAGNOSIS — E78.00 HIGH CHOLESTEROL: ICD-10-CM

## 2017-07-31 NOTE — TELEPHONE ENCOUNTER
Return call made to [facility]. She states that she was told by someone at the [facility] today that the CAV would be at [address] [facility] today. She is very upset that she went there and was not able to  the patient's PAP Detrol since the CAV is not at [place] today. We were there last week and had the patient's Detrol with us then. Per chart review, there is an active prescription for Detrol at the [facility] that was sent in at the end of June and includes 2 refills. Ms. Mart Bhakta stated she can not come to any other [facility] clinic because all are about 45 minutes away. She is expressed frustration that she will now have to pay for a prescription and asked why no one called her from the [place] clinic. Explained to her that wifi and cellular connectivity is often a challenge at the [place] site and the [facility] can not promise to call patient's from the clinic sites to remind them to come  their medicine. She then asked why it couldn't be mailed to her. (I have had this conversation with her in the past as well.) Explained to her again that the [facility] by policy does not mail any medications to patients. She will purchase the Detrol at the [facility] and come to the [facility] on 08-28-17 to  the patient's PAP medication.  Krunal Morales RN

## 2017-08-01 RX ORDER — GLIPIZIDE 2.5 MG/1
TABLET, EXTENDED RELEASE ORAL
Qty: 60 TAB | Refills: 5 | Status: SHIPPED | OUTPATIENT
Start: 2017-08-01 | End: 2018-03-26 | Stop reason: SDUPTHER

## 2017-08-01 RX ORDER — ATORVASTATIN CALCIUM 10 MG/1
TABLET, FILM COATED ORAL
Qty: 90 TAB | Refills: 1 | Status: SHIPPED | OUTPATIENT
Start: 2017-08-01 | End: 2018-01-23 | Stop reason: SDUPTHER

## 2017-08-09 NOTE — TELEPHONE ENCOUNTER
Return call made to patient's caregiver, Angi Han, who stated that the [facility] only gave her 2 Detrol caps for the patient. Because of the cost, she usually buys them 7 at a time when in between PAP deliveries. (Per chart review, they did not come to the [facility] to  the patient's PAP Detrol. They thought the CAV was coming to [place] on 07-31-17 rather than on 7-24-17.) Also per chart review, there is a \"no print\" prescription for Detrol, 30 caps, 2 refills entered on 06-30-17. I called the patient's [address] [place] [address] pharmacy and spoke with Quin Rolle. He stated that they did not have the 06-30-17 prescription on file and gave Ms. Dangelo 2 capsules of Detrol today because that was all that was left on the prescription they had on file from December, 2016. Explained that the caregiver buys them 7 at a time due to cost and only needs them in between occasionally in between PAP deliveries. Gave verbal order to refill patient's Detrol for 30 capsules (per [address] [place] [address], [place]). This will get them through until the CAV visit to [place] on 08-28-17. TC made to Angi Han to let her know that a refill of 30 capsules was called in and will be available for pick-up, in whole or in part, tomorrow. She expressed understanding and confirmed that they will  the patient's PAP Detrol on Monday 08-28-17 at the [address] [place] [address].  Krunal Morales RN

## 2017-08-09 NOTE — TELEPHONE ENCOUNTER
Please call Ms. Dangelo regarding number of pills in prescription she picked up at pharmacy for patient.     Jama Ha April

## 2017-08-15 DIAGNOSIS — I10 ESSENTIAL HYPERTENSION WITH GOAL BLOOD PRESSURE LESS THAN 130/85: ICD-10-CM

## 2017-08-16 RX ORDER — METOPROLOL TARTRATE 25 MG/1
TABLET, FILM COATED ORAL
Qty: 60 TAB | Refills: 3 | Status: SHIPPED | OUTPATIENT
Start: 2017-08-16 | End: 2018-03-26 | Stop reason: ALTCHOICE

## 2017-08-16 NOTE — TELEPHONE ENCOUNTER
Patient's caretaker, Nancy Torres, called to ask if we are going to refill the prescription for Metoprolol  Please let her know. Shiela Lutz April  .

## 2017-08-28 ENCOUNTER — CLINICAL SUPPORT (OUTPATIENT)
Dept: FAMILY MEDICINE CLINIC | Age: 62
End: 2017-08-28

## 2017-08-28 DIAGNOSIS — Z71.9 COUNSELED BY NURSE: Primary | ICD-10-CM

## 2017-09-06 ENCOUNTER — HOSPITAL ENCOUNTER (OUTPATIENT)
Dept: PREADMISSION TESTING | Age: 62
Discharge: HOME | End: 2017-09-06
Attending: UROLOGY
Payer: SELF-PAY

## 2017-09-06 VITALS
WEIGHT: 147.93 LBS | BODY MASS INDEX: 34.23 KG/M2 | HEART RATE: 82 BPM | RESPIRATION RATE: 18 BRPM | OXYGEN SATURATION: 98 % | HEIGHT: 55 IN | SYSTOLIC BLOOD PRESSURE: 139 MMHG | TEMPERATURE: 98.3 F | DIASTOLIC BLOOD PRESSURE: 72 MMHG

## 2017-09-06 LAB
ALBUMIN SERPL-MCNC: 3.1 G/DL (ref 3.5–5)
ALBUMIN/GLOB SERPL: 0.7 {RATIO} (ref 1.1–2.2)
ALP SERPL-CCNC: 134 U/L (ref 45–117)
ALT SERPL-CCNC: 19 U/L (ref 12–78)
ANION GAP SERPL CALC-SCNC: 9 MMOL/L (ref 5–15)
APPEARANCE UR: ABNORMAL
AST SERPL-CCNC: 18 U/L (ref 15–37)
BACTERIA URNS QL MICRO: ABNORMAL /HPF
BASOPHILS # BLD: 0 K/UL (ref 0–0.1)
BASOPHILS NFR BLD: 1 % (ref 0–1)
BILIRUB SERPL-MCNC: 0.2 MG/DL (ref 0.2–1)
BILIRUB UR QL: NEGATIVE
BUN SERPL-MCNC: 35 MG/DL (ref 6–20)
BUN/CREAT SERPL: 25 (ref 12–20)
CALCIUM SERPL-MCNC: 8.9 MG/DL (ref 8.5–10.1)
CHLORIDE SERPL-SCNC: 106 MMOL/L (ref 97–108)
CO2 SERPL-SCNC: 26 MMOL/L (ref 21–32)
COLOR UR: ABNORMAL
CREAT SERPL-MCNC: 1.38 MG/DL (ref 0.55–1.02)
EOSINOPHIL # BLD: 0.1 K/UL (ref 0–0.4)
EOSINOPHIL NFR BLD: 2 % (ref 0–7)
EPITH CASTS URNS QL MICRO: ABNORMAL /LPF
ERYTHROCYTE [DISTWIDTH] IN BLOOD BY AUTOMATED COUNT: 15.6 % (ref 11.5–14.5)
GLOBULIN SER CALC-MCNC: 4.4 G/DL (ref 2–4)
GLUCOSE SERPL-MCNC: 98 MG/DL (ref 65–100)
GLUCOSE UR STRIP.AUTO-MCNC: NEGATIVE MG/DL
GRAN CASTS URNS QL MICRO: ABNORMAL /LPF
HCT VFR BLD AUTO: 31.3 % (ref 35–47)
HGB BLD-MCNC: 9.5 G/DL (ref 11.5–16)
HGB UR QL STRIP: ABNORMAL
KETONES UR QL STRIP.AUTO: NEGATIVE MG/DL
LEUKOCYTE ESTERASE UR QL STRIP.AUTO: ABNORMAL
LYMPHOCYTES # BLD: 1.2 K/UL (ref 0.8–3.5)
LYMPHOCYTES NFR BLD: 21 % (ref 12–49)
MCH RBC QN AUTO: 27.3 PG (ref 26–34)
MCHC RBC AUTO-ENTMCNC: 30.4 G/DL (ref 30–36.5)
MCV RBC AUTO: 89.9 FL (ref 80–99)
MONOCYTES # BLD: 0.5 K/UL (ref 0–1)
MONOCYTES NFR BLD: 9 % (ref 5–13)
NEUTS SEG # BLD: 3.9 K/UL (ref 1.8–8)
NEUTS SEG NFR BLD: 67 % (ref 32–75)
NITRITE UR QL STRIP.AUTO: POSITIVE
PH UR STRIP: 6 [PH] (ref 5–8)
PLATELET # BLD AUTO: 175 K/UL (ref 150–400)
POTASSIUM SERPL-SCNC: 4.8 MMOL/L (ref 3.5–5.1)
PROT SERPL-MCNC: 7.5 G/DL (ref 6.4–8.2)
PROT UR STRIP-MCNC: 30 MG/DL
RBC # BLD AUTO: 3.48 M/UL (ref 3.8–5.2)
RBC #/AREA URNS HPF: ABNORMAL /HPF (ref 0–5)
SODIUM SERPL-SCNC: 141 MMOL/L (ref 136–145)
SP GR UR REFRACTOMETRY: 1.01 (ref 1–1.03)
UA: UC IF INDICATED,UAUC: ABNORMAL
UROBILINOGEN UR QL STRIP.AUTO: 0.2 EU/DL (ref 0.2–1)
WBC # BLD AUTO: 5.8 K/UL (ref 3.6–11)
WBC CASTS URNS QL MICRO: ABNORMAL /LPF
WBC URNS QL MICRO: >100 /HPF (ref 0–4)

## 2017-09-06 PROCEDURE — 36415 COLL VENOUS BLD VENIPUNCTURE: CPT | Performed by: UROLOGY

## 2017-09-06 PROCEDURE — 87186 SC STD MICRODIL/AGAR DIL: CPT | Performed by: UROLOGY

## 2017-09-06 PROCEDURE — 80053 COMPREHEN METABOLIC PANEL: CPT | Performed by: UROLOGY

## 2017-09-06 PROCEDURE — 81001 URINALYSIS AUTO W/SCOPE: CPT | Performed by: UROLOGY

## 2017-09-06 PROCEDURE — 85025 COMPLETE CBC W/AUTO DIFF WBC: CPT | Performed by: UROLOGY

## 2017-09-06 PROCEDURE — 87086 URINE CULTURE/COLONY COUNT: CPT | Performed by: UROLOGY

## 2017-09-06 PROCEDURE — 87077 CULTURE AEROBIC IDENTIFY: CPT | Performed by: UROLOGY

## 2017-09-06 NOTE — PERIOP NOTES
[facility]  PREOPERATIVE INSTRUCTIONS    Surgery Date:   09/13/17      Surgery arrival time given by surgeon: NO  (If [facility] staff will call you between 3pm - 7pm the day before surgery with your arrival time. If your surgery is on a Monday, we will call you the preceding Friday. Please call [phone] after 7pm if you did not receive your arrival time.)  1. Report  to the 2nd Floor Admitting Desk on the day of your surgery. Bring your insurance card, photo identification, and any copayment (if applicable). 2. You must have a responsible adult to drive you home and stay with you the first 24 hours after surgery if you are going home the same day of your surgery. 3. Nothing to eat or drink after midnight the night before surgery. This means NO water, gum, mints, coffee, juice, etc.    4. MEDICATIONS TO TAKE THE MORNING OF SURGERY WITH A SIP OF WATER: Metoprolol, Zoloft  5. No alcoholic beverages 24 hours before and after your surgery. 6. If you are being admitted to the hospital,please leave personal belongings/luggage in your car until you have an assigned hospital room number. ( The hospital discharge time is 12 PM NOON. Your adult  should be at the hospital prior to the noon discharge time unless otherwise instructed.)   7. STOP Aspirin and/or any non-steroidal anti-inflammatory drugs (i.e. Ibuprofen, Naproxen, Advil, Aleve) as directed by your surgeon. You may take Tylenol. Stop herbal supplements 1 week prior to  surgery. 8. If you are currently taking Plavix, Coumadin,or any other blood-thinning/ anticoagulant medication contact your surgeon for instructions. 9. Wear comfortable clothes. Wear your glasses instead of contacts. Please leave all money, jewelry and valuables at home. No make up, particularly mascara, the day of surgery. 10.  REMOVE ALL body piercings, rings,and jewelry and leave at home. Wear your hair loose or down, no pony-tails, buns, or any metal hair clips.    11. If you shower the morning of surgery, please do not apply any lotions, powders, or deodorants afterwards. Do not shave any body area within 24 hours of your surgery. 12. Please follow all instructions to avoid any potential surgical cancellation. 13. Should your physical condition change, (i.e. fever, cold, flu, etc.) please notify your surgeon as soon as possible. 14. It is important to be on time. If a situation occurs where you may be delayed, please call:  [phone] / [phone] on the day of surgery. 15. The [facility] staff can be reached at [phone]. 16. Special instructions:    17. The patient and caregiver was contacted  in person. She  verbalize  understanding of all instructions does not  need reinforcement.

## 2017-09-06 NOTE — H&P
PAT Pre-Op History & Physical    Patient: Iker Connelltein                  MRN: [id number]          SSN: [id number]  YOB: 1955          Age: 65 y.o. Sex: female                Subjective:   Patient is a 65 y.o.  female who presents with history of left hydronephrosis that is attributed to narrowing of the ureter secondary to radiation therapy for cervical cancer. Has solitary kidney- history of right nephrectomy in 1998. Has periodic cystoscopies and stent exchange- last done 5/25/2017. Patient was treated with nasal mupirocin ointment twice daily x 5 days prior to surgery for + MRSA on nasal swab. The patient was evaluated in the surgeon's office and it was determined that the most appropriate plan of care is to proceed with surgical intervention. Patient's PCP CARRIE Farley    Patient is accompanied by her caregiver- Erica Torre- who helps answer questions. Past Medical History:   Diagnosis Date    Anemia     Chronic kidney disease     stage 3    Coronary artery disease 9/17/2014    A. Echo (9/16/14):  EF 45% with mid-distal septal/anterior HK, DD.   PASP 37.    Coronary artery disease     MI 9/2014    Depression     Diabetes ([address]     DM type 2 causing renal disease ([address]     History of nephrectomy     Right kidney    Hx MRSA infection     had negative swab [id number] but + swab on 5/16/2017    Hyperlipidemia     Hypertension     Mental retardation     Nephrolithiasis     hx    Uterine cancer ([address]     hx    UTI (lower urinary tract infection)     Vitamin D deficiency       Past Surgical History:   Procedure Laterality Date    HX CHOLECYSTECTOMY      HX DILATION AND CURETTAGE      HX HYSTERECTOMY      HX ORTHOPAEDIC      ORIF left arm, hx of fracture    HX ORTHOPAEDIC      Right ankle fx repair    HX OTHER SURGICAL  9/2014    I&D right leg wound    HX UROLOGICAL  1998    right nephrectomy    HX UROLOGICAL Left     multiple renal stents      Prior to Admission medications    Medication Sig Start Date End Date Taking? Authorizing Provider   metoprolol tartrate (LOPRESSOR) 25 mg tablet take 1/2 tablet by mouth every 12 hours 8/16/17  Yes CARRIE Zeng   glipiZIDE SR (GLUCOTROL XL) 2.5 mg CR tablet take 1 tablet by mouth twice a day 8/1/17  Yes CARRIE Zeng   atorvastatin (LIPITOR) 10 mg tablet take 1 tablet by mouth once daily TO START WHEN YOU RUN OUT OF CRESTOR 8/1/17  Yes CARRIE Zeng   tolterodine ER (DETROL-LA) 2 mg ER capsule Take 1 Cap by mouth nightly. 6/30/17  Yes CARRIE Zeng   pioglitazone (ACTOS) 30 mg tablet Take 1 Tab by mouth daily. 5/24/17  Yes CARRIE Zeng   sertraline (ZOLOFT) 50 mg tablet take 1 tablet by mouth once daily BEFORE BREAKFAST 5/10/17  Yes CARRIE Zeng   aspirin delayed-release 81 mg tablet Take 81 mg by mouth nightly. Yes Historical Provider   multivitamin with iron tablet Take 1 Tab by mouth daily. Yes Historical Provider   cyanocobalamin 1,000 mcg tablet Take 1 Tab by mouth daily. For B12 2/24/14  Yes Linh Massey NP     Current Outpatient Prescriptions   Medication Sig    metoprolol tartrate (LOPRESSOR) 25 mg tablet take 1/2 tablet by mouth every 12 hours    glipiZIDE SR (GLUCOTROL XL) 2.5 mg CR tablet take 1 tablet by mouth twice a day    atorvastatin (LIPITOR) 10 mg tablet take 1 tablet by mouth once daily TO START WHEN YOU RUN OUT OF CRESTOR    tolterodine ER (DETROL-LA) 2 mg ER capsule Take 1 Cap by mouth nightly.  pioglitazone (ACTOS) 30 mg tablet Take 1 Tab by mouth daily.  sertraline (ZOLOFT) 50 mg tablet take 1 tablet by mouth once daily BEFORE BREAKFAST    aspirin delayed-release 81 mg tablet Take 81 mg by mouth nightly.  multivitamin with iron tablet Take 1 Tab by mouth daily.  cyanocobalamin 1,000 mcg tablet Take 1 Tab by mouth daily. For B12     No current facility-administered medications for this encounter.        Allergies   Allergen Reactions    Hydrocodone Nausea Only    Ibuprofen Unknown (comments)     Pt cannot take because of having only one kidney    Penicillins Rash     Tolerates Cephalexin       Social History   Substance Use Topics    Smoking status: Never Smoker    Smokeless tobacco: Never Used    Alcohol use No      History   Drug Use No     Family History   Problem Relation Age of Onset    Mental Retardation Mother     Heart Disease Mother     Breast Cancer Sister     No Known Problems Brother     Malignant Hyperthermia Neg Hx     Pseudocholinesterase Deficiency Neg Hx     Delayed Awakening Neg Hx     Post-op Nausea/Vomiting Neg Hx     Emergence Delirium Neg Hx     Post-op Cognitive Dysfunction Neg Hx          Review of Systems    Patient denies difficulty swallowing, mouth sores, or loose teeth. Patient denies any recent dental procedures or any planned prior to surgery. Patient denies chest pain, tightness, pain radiating down left arm, palpitations. Denies dizziness, visual disturbances, or lightheadedness. Patient denies shortness of breath, wheezing, cough, fever, or chills. Patient denies diarrhea, constipation, or abdominal pain. Patient denies urinary problems including dysuria, hesitancy, urgency, or incontinence. Denies skin breakdown, rashes, insect bites or open area. Objective:   Patient Vitals for the past 24 hrs:   Temp Pulse Resp BP SpO2   17 1358 98.3 °F (36.8 °C) 82 18 139/72 98 %     Temp (24hrs), Av.3 °F (36.8 °C), Min:98.3 °F (36.8 °C), Max:98.3 °F (36.8 °C)    Body mass index is 35.02 kg/(m^2). Wt Readings from Last 1 Encounters:   17 67.1 kg (147 lb 14.9 oz)        Physical Exam:     General: Pleasant,  cooperative, no apparent distress, appears stated age. Eyes: Conjunctivae/corneas clear. EOMs intact.    Nose: Nares normal.   Mouth/Throat: Lips, mucosa, and tongue normal. Top dentures in place, no lower teeth- gums normal.   Neck: Supple, symmetrical, trachea midline. Back: Symmetric   Lungs: Clear to auscultation bilaterally. Heart: Regular rate and rhythm, S1, S2 normal. No murmur, click, rub or gallop. Abdomen: Soft, non-tender. Bowel sounds normal. No distention. Musculoskeletal:  Unremarkable. Extremities:  Extremities normal, atraumatic, no cyanosis or edema. Calves                                 supple, non tender to palpation. Pulses: 2+ and symmetric bilateral upper extremities. Cap. refill <2 seconds   Skin: Skin color, texture, turgor normal.  No rashes or lesions. Neurologic: CN II-XII grossly intact. Alert and oriented x3. Labs:   Recent Results (from the past 72 hour(s))   CBC WITH AUTOMATED DIFF    Collection Time: 09/06/17  2:37 PM   Result Value Ref Range    WBC 5.8 3.6 - 11.0 K/uL    RBC 3.48 (L) 3.80 - 5.20 M/uL    HGB 9.5 (L) 11.5 - 16.0 g/dL    HCT 31.3 (L) 35.0 - 47.0 %    MCV 89.9 80.0 - 99.0 FL    MCH 27.3 26.0 - 34.0 PG    MCHC 30.4 30.0 - 36.5 g/dL    RDW 15.6 (H) 11.5 - 14.5 %    PLATELET 937 167 - 606 K/uL    NEUTROPHILS 67 32 - 75 %    LYMPHOCYTES 21 12 - 49 %    MONOCYTES 9 5 - 13 %    EOSINOPHILS 2 0 - 7 %    BASOPHILS 1 0 - 1 %    ABS. NEUTROPHILS 3.9 1.8 - 8.0 K/UL    ABS. LYMPHOCYTES 1.2 0.8 - 3.5 K/UL    ABS. MONOCYTES 0.5 0.0 - 1.0 K/UL    ABS. EOSINOPHILS 0.1 0.0 - 0.4 K/UL    ABS. BASOPHILS 0.0 0.0 - 0.1 K/UL   METABOLIC PANEL, COMPREHENSIVE    Collection Time: 09/06/17  2:37 PM   Result Value Ref Range    Sodium 141 136 - 145 mmol/L    Potassium 4.8 3.5 - 5.1 mmol/L    Chloride 106 97 - 108 mmol/L    CO2 26 21 - 32 mmol/L    Anion gap 9 5 - 15 mmol/L    Glucose 98 65 - 100 mg/dL    BUN 35 (H) 6 - 20 MG/DL    Creatinine 1.38 (H) 0.55 - 1.02 MG/DL    BUN/Creatinine ratio 25 (H) 12 - 20      GFR est AA 47 (L) >60 ml/min/1.73m2    GFR est non-AA 39 (L) >60 ml/min/1.73m2    Calcium 8.9 8.5 - 10.1 MG/DL    Bilirubin, total 0.2 0.2 - 1.0 MG/DL    ALT (SGPT) 19 12 - 78 U/L    AST (SGOT) 18 15 - 37 U/L    Alk. phosphatase 134 (H) 45 - 117 U/L    Protein, total 7.5 6.4 - 8.2 g/dL    Albumin 3.1 (L) 3.5 - 5.0 g/dL    Globulin 4.4 (H) 2.0 - 4.0 g/dL    A-G Ratio 0.7 (L) 1.1 - 2.2     URINALYSIS W/ REFLEX CULTURE    Collection Time: 09/06/17  2:52 PM   Result Value Ref Range    Color YELLOW/STRAW      Appearance TURBID (A) CLEAR      Specific gravity 1.012 1.003 - 1.030      pH (UA) 6.0 5.0 - 8.0      Protein 30 (A) NEG mg/dL    Glucose NEGATIVE  NEG mg/dL    Ketone NEGATIVE  NEG mg/dL    Bilirubin NEGATIVE  NEG      Blood LARGE (A) NEG      Urobilinogen 0.2 0.2 - 1.0 EU/dL    Nitrites POSITIVE (A) NEG      Leukocyte Esterase LARGE (A) NEG      WBC >100 (H) 0 - 4 /hpf    RBC  0 - 5 /hpf    Epithelial cells FEW FEW /lpf    Bacteria 1+ (A) NEG /hpf    UA:UC IF INDICATED URINE CULTURE ORDERED (A) CNI      Granular cast 0-2 (A) NEG /lpf    WBC Cast 2-5 (A) NEG /lpf       Assessment:     Hydronephrosis    Plan:     Scheduled for cystoscopy left retrogrades left ureteral stent exchange. Labs done per surgeon's orders. MRSA swab done per anesthesia protocol. Labs reviewed- results unremarkable except H/H low (9.5/31.3). Also creatine elevated and GFR decreased (1.38/39) but seems to be patient's baseline. UA triggered C&S- results pending and MRSA results pending. EKG done 5/16/2017 reviewed in [facility]- unremarkable. Patient denies any change in cardiac status in interim.     Lisandro Rodríguez NP

## 2017-09-07 LAB
BACTERIA SPEC CULT: NORMAL
BACTERIA SPEC CULT: NORMAL
SERVICE CMNT-IMP: NORMAL

## 2017-09-08 NOTE — PERIOP NOTES
Urine culture shows >100,000 colonies of e-coli. Faxed results along with a urine treatment plan to Dr. Willow Betancourt office. Left a VM for [place] informing her of this.   DOS: 9/13/2017

## 2017-09-12 ENCOUNTER — ANESTHESIA EVENT (OUTPATIENT)
Dept: SURGERY | Age: 62
End: 2017-09-12
Payer: SELF-PAY

## 2017-09-12 NOTE — PERIOP NOTES
Urine treatment plan received back from Dr. Ellie Lemus office and placed on paper chart. Patient has been prescribed Cipro 250 mg PO BID for 10 days.   DOS: 9/13/2017

## 2017-09-13 ENCOUNTER — APPOINTMENT (OUTPATIENT)
Dept: GENERAL RADIOLOGY | Age: 62
End: 2017-09-13
Attending: UROLOGY
Payer: SELF-PAY

## 2017-09-13 ENCOUNTER — ANESTHESIA (OUTPATIENT)
Dept: SURGERY | Age: 62
End: 2017-09-13
Payer: SELF-PAY

## 2017-09-13 ENCOUNTER — HOSPITAL ENCOUNTER (OUTPATIENT)
Age: 62
Setting detail: OUTPATIENT SURGERY
Discharge: HOME | End: 2017-09-13
Attending: UROLOGY | Admitting: UROLOGY
Payer: SELF-PAY

## 2017-09-13 VITALS
OXYGEN SATURATION: 96 % | WEIGHT: 145.94 LBS | TEMPERATURE: 98.6 F | BODY MASS INDEX: 33.78 KG/M2 | HEIGHT: 55 IN | DIASTOLIC BLOOD PRESSURE: 55 MMHG | RESPIRATION RATE: 19 BRPM | HEART RATE: 99 BPM | SYSTOLIC BLOOD PRESSURE: 125 MMHG

## 2017-09-13 LAB
GLUCOSE BLD STRIP.AUTO-MCNC: 167 MG/DL (ref 65–100)
SERVICE CMNT-IMP: ABNORMAL

## 2017-09-13 PROCEDURE — 74011250636 HC RX REV CODE- 250/636: Performed by: ANESTHESIOLOGY

## 2017-09-13 PROCEDURE — 76210000040 HC AMBSU PH I REC FIRST 0.5 HR: Performed by: UROLOGY

## 2017-09-13 PROCEDURE — 74011250636 HC RX REV CODE- 250/636

## 2017-09-13 PROCEDURE — 76210000050 HC AMBSU PH II REC 0.5 TO 1 HR: Performed by: UROLOGY

## 2017-09-13 PROCEDURE — 76060000061 HC AMB SURG ANES 0.5 TO 1 HR: Performed by: UROLOGY

## 2017-09-13 PROCEDURE — 77030018832 HC SOL IRR H20 ICUM -A: Performed by: UROLOGY

## 2017-09-13 PROCEDURE — 74011250636 HC RX REV CODE- 250/636: Performed by: UROLOGY

## 2017-09-13 PROCEDURE — 87186 SC STD MICRODIL/AGAR DIL: CPT | Performed by: UROLOGY

## 2017-09-13 PROCEDURE — 77030020143 HC AIRWY LARYN INTUB CGAS -A: Performed by: ANESTHESIOLOGY

## 2017-09-13 PROCEDURE — 77030019927 HC TBNG IRR CYSTO BAXT -A: Performed by: UROLOGY

## 2017-09-13 PROCEDURE — 76030000000 HC AMB SURG OR TIME 0.5 TO 1: Performed by: UROLOGY

## 2017-09-13 PROCEDURE — 82962 GLUCOSE BLOOD TEST: CPT

## 2017-09-13 PROCEDURE — C1758 CATHETER, URETERAL: HCPCS | Performed by: UROLOGY

## 2017-09-13 PROCEDURE — C2617 STENT, NON-COR, TEM W/O DEL: HCPCS | Performed by: UROLOGY

## 2017-09-13 PROCEDURE — 74011636320 HC RX REV CODE- 636/320: Performed by: UROLOGY

## 2017-09-13 PROCEDURE — 74420 UROGRAPHY RTRGR +-KUB: CPT

## 2017-09-13 PROCEDURE — 87077 CULTURE AEROBIC IDENTIFY: CPT | Performed by: UROLOGY

## 2017-09-13 PROCEDURE — 87086 URINE CULTURE/COLONY COUNT: CPT | Performed by: UROLOGY

## 2017-09-13 PROCEDURE — 76000 FLUOROSCOPY <1 HR PHYS/QHP: CPT

## 2017-09-13 PROCEDURE — 77030020782 HC GWN BAIR PAWS FLX 3M -B

## 2017-09-13 PROCEDURE — 74011000250 HC RX REV CODE- 250

## 2017-09-13 DEVICE — URETERAL STENT
Type: IMPLANTABLE DEVICE | Site: URETER | Status: FUNCTIONAL
Brand: CONTOUR™

## 2017-09-13 RX ORDER — CEFAZOLIN SODIUM IN 0.9 % NACL 2 G/50 ML
2 INTRAVENOUS SOLUTION, PIGGYBACK (ML) INTRAVENOUS ONCE
Status: COMPLETED | OUTPATIENT
Start: 2017-09-13 | End: 2017-09-13

## 2017-09-13 RX ORDER — FENTANYL CITRATE 50 UG/ML
INJECTION, SOLUTION INTRAMUSCULAR; INTRAVENOUS AS NEEDED
Status: DISCONTINUED | OUTPATIENT
Start: 2017-09-13 | End: 2017-09-13 | Stop reason: HOSPADM

## 2017-09-13 RX ORDER — SODIUM CHLORIDE, SODIUM LACTATE, POTASSIUM CHLORIDE, CALCIUM CHLORIDE 600; 310; 30; 20 MG/100ML; MG/100ML; MG/100ML; MG/100ML
125 INJECTION, SOLUTION INTRAVENOUS CONTINUOUS
Status: DISCONTINUED | OUTPATIENT
Start: 2017-09-13 | End: 2017-09-13 | Stop reason: HOSPADM

## 2017-09-13 RX ORDER — ONDANSETRON 2 MG/ML
INJECTION INTRAMUSCULAR; INTRAVENOUS AS NEEDED
Status: DISCONTINUED | OUTPATIENT
Start: 2017-09-13 | End: 2017-09-13 | Stop reason: HOSPADM

## 2017-09-13 RX ORDER — HYDROMORPHONE HYDROCHLORIDE 1 MG/ML
.25-1 INJECTION, SOLUTION INTRAMUSCULAR; INTRAVENOUS; SUBCUTANEOUS
Status: DISCONTINUED | OUTPATIENT
Start: 2017-09-13 | End: 2017-09-13 | Stop reason: HOSPADM

## 2017-09-13 RX ORDER — FLUMAZENIL 0.1 MG/ML
0.2 INJECTION INTRAVENOUS
Status: DISCONTINUED | OUTPATIENT
Start: 2017-09-13 | End: 2017-09-13 | Stop reason: HOSPADM

## 2017-09-13 RX ORDER — NALOXONE HYDROCHLORIDE 0.4 MG/ML
0.2 INJECTION, SOLUTION INTRAMUSCULAR; INTRAVENOUS; SUBCUTANEOUS
Status: DISCONTINUED | OUTPATIENT
Start: 2017-09-13 | End: 2017-09-13 | Stop reason: HOSPADM

## 2017-09-13 RX ORDER — DIPHENHYDRAMINE HYDROCHLORIDE 50 MG/ML
12.5 INJECTION, SOLUTION INTRAMUSCULAR; INTRAVENOUS AS NEEDED
Status: DISCONTINUED | OUTPATIENT
Start: 2017-09-13 | End: 2017-09-13 | Stop reason: HOSPADM

## 2017-09-13 RX ORDER — PROPOFOL 10 MG/ML
INJECTION, EMULSION INTRAVENOUS AS NEEDED
Status: DISCONTINUED | OUTPATIENT
Start: 2017-09-13 | End: 2017-09-13 | Stop reason: HOSPADM

## 2017-09-13 RX ORDER — LIDOCAINE HYDROCHLORIDE 20 MG/ML
INJECTION, SOLUTION EPIDURAL; INFILTRATION; INTRACAUDAL; PERINEURAL AS NEEDED
Status: DISCONTINUED | OUTPATIENT
Start: 2017-09-13 | End: 2017-09-13 | Stop reason: HOSPADM

## 2017-09-13 RX ORDER — MIDAZOLAM HYDROCHLORIDE 1 MG/ML
INJECTION, SOLUTION INTRAMUSCULAR; INTRAVENOUS AS NEEDED
Status: DISCONTINUED | OUTPATIENT
Start: 2017-09-13 | End: 2017-09-13 | Stop reason: HOSPADM

## 2017-09-13 RX ORDER — MIDAZOLAM HYDROCHLORIDE 1 MG/ML
2 INJECTION, SOLUTION INTRAMUSCULAR; INTRAVENOUS
Status: DISCONTINUED | OUTPATIENT
Start: 2017-09-13 | End: 2017-09-13 | Stop reason: HOSPADM

## 2017-09-13 RX ORDER — LIDOCAINE HYDROCHLORIDE 10 MG/ML
0.1 INJECTION, SOLUTION EPIDURAL; INFILTRATION; INTRACAUDAL; PERINEURAL AS NEEDED
Status: DISCONTINUED | OUTPATIENT
Start: 2017-09-13 | End: 2017-09-13 | Stop reason: HOSPADM

## 2017-09-13 RX ADMIN — FENTANYL CITRATE 25 MCG: 50 INJECTION, SOLUTION INTRAMUSCULAR; INTRAVENOUS at 11:15

## 2017-09-13 RX ADMIN — LIDOCAINE HYDROCHLORIDE 40 MG: 20 INJECTION, SOLUTION EPIDURAL; INFILTRATION; INTRACAUDAL; PERINEURAL at 10:42

## 2017-09-13 RX ADMIN — SODIUM CHLORIDE, SODIUM LACTATE, POTASSIUM CHLORIDE, AND CALCIUM CHLORIDE 125 ML/HR: 600; 310; 30; 20 INJECTION, SOLUTION INTRAVENOUS at 09:29

## 2017-09-13 RX ADMIN — FENTANYL CITRATE 25 MCG: 50 INJECTION, SOLUTION INTRAMUSCULAR; INTRAVENOUS at 11:08

## 2017-09-13 RX ADMIN — MIDAZOLAM HYDROCHLORIDE 2 MG: 1 INJECTION, SOLUTION INTRAMUSCULAR; INTRAVENOUS at 10:44

## 2017-09-13 RX ADMIN — FENTANYL CITRATE 50 MCG: 50 INJECTION, SOLUTION INTRAMUSCULAR; INTRAVENOUS at 10:42

## 2017-09-13 RX ADMIN — PROPOFOL 150 MG: 10 INJECTION, EMULSION INTRAVENOUS at 10:46

## 2017-09-13 RX ADMIN — CEFAZOLIN 2 G: 1 INJECTION, POWDER, FOR SOLUTION INTRAMUSCULAR; INTRAVENOUS; PARENTERAL at 10:50

## 2017-09-13 RX ADMIN — ONDANSETRON 4 MG: 2 INJECTION INTRAMUSCULAR; INTRAVENOUS at 10:57

## 2017-09-13 NOTE — PROGRESS NOTES
POST ANESTHESIA CARE    DISCHARGE / TRANSFER NOTE    Lilian Aus was   discharged     via   wheel chair     to     private vehicle    . Patient was escorted by   technician    . Patient verbalized   appreciation and was very pleased with care received   throughout their stay. Patient was discharged in     pleasant mood   . Pain at discharge/transfer was     0/10. Discharge, medication and follow-up instructions were verbalized as understood prior to discharge  (if applicable for same-day procedures being discharged.)    All personal belongings have been returned to patient, and patient/family verbally confirm receiving belongings as all present. Leo Left BSN RN-BC Clin. IV

## 2017-09-13 NOTE — OP NOTES
[facility]   [address] [facility], [address]   OP NOTE       Name:  Veronica Johnson   MR#:  [id number]   :  1955   Account #:  [de-identified]    Surgery Date:  2017   Date of Adm:  2017       PREOPERATIVE DIAGNOSES   1. Solitary left kidney. 2. Ureteral obstruction. 3. Recurrent urinary tract infections. POSTOPERATIVE DIAGNOSES   1. Solitary left kidney. 2. Ureteral obstruction. 3. Recurrent urinary tract infections. PROCEDURES PERFORMED   1. Cystoscopy. 2. Left double-J stent exchange, 6 x 22.   3. Left retrograde. SURGEON: Feliciano العراقي MD.    ANESTHESIA: General.    INDICATIONS: A 81-year-old female well known to us with the above   diagnosis. She presents for stent exchange. She is on antibiotics for   the UTIs which are chronic and recurrent. FINDINGS AT TIME OF THE PROCEDURE: Likely some mild   hydronephrosis and occlusion of her left stent due to the fact there was   about 30 mL of slightly cloudy urine in her left renal pelvis. The stent   had mild encrustations on it. She had diffuse erythema in the bladder   as seen previously. SPECIMENS REMOVED: Urine culture, left kidney. ESTIMATED BLOOD LOSS: Zero. DESCRIPTION OF THE PROCEDURE: After consent was obtained,   the patient was taken to the operating room. After adequate anesthesia   was obtained, she was prepped and draped in lithotomy position. She   was given additional IV antibiotics. A rigid cystoscope was inserted in   the bladder, the above findings noted. I grasped the stent, pulled it out   through the meatus, cut the curl off and then passed a Bentson wire up   under fluoroscopic guidance into the region of the renal pelvis. I then   advanced an opening catheter over that and into the region of the renal   pelvis. I withdrew the wire. I then aspirated about 30 mL of urine from   the kidney.  The majority of it was clear, but then when I got down to the last syringe full, it was a little cloudy. I sent that for urine culture. I   then shot a gentle retrograde, which showed a dilation in the upper   pole jim. I then irrigated some more of the debris out of the renal   pelvis. I then manipulated the opening catheter up to the upper pole   jim. I then replaced the wire and coiled it in the upper pole jim. I   then withdrew the opening catheter. I then backloaded the cystoscope   in over the wire. I passed a 6 x 22 stent, proximal end coiled in the   dilated upper pole jim, distal end coiled within the bladder. The scope   was then withdrawn and the procedure terminated. She was awakened   from anesthesia and taken to the recovery room in stable condition. PLAN: She will be discharged should she should do well later today. She has 10 days' worth of Cipro to take; she will complete that. It   seems like her stent becomes occluded a little bit more frequently. We   will have her set up in the next 3-4 months for repeat cystoscopy, stent   change. She will go through the preoperative clinic and make sure we   get preoperative cultures.              Danay Wolff MD MM / Donald Leung   D:  09/13/2017   11:32   T:  09/13/2017   13:30   Job #:  [id number]

## 2017-09-13 NOTE — BRIEF OP NOTE
BRIEF OPERATIVE NOTE    Date of Procedure: 9/13/2017   Preoperative Diagnosis: HYDRONEPHROSIS  Postoperative Diagnosis: HYDRONEPHROSIS    Procedure(s):  CYSTOSCOPY WITH LEFT RETROGRADES, LEFT STENT EXCHANGE OF SOLITARY KIDNEY  Surgeon(s) and Role:     * Tommy Houser MD - Primary         Assistant Staff:       Surgical Staff:  Circ-1: Mable Ruiz RN  Scrub Tech-1: Bill Soler  Float Staff: Osmany Berumen RN; Thalia Leroy  Event Time In   Incision Start 1106   Incision Close 1115     Anesthesia: General   Estimated Blood Loss: 0  Specimens:   ID Type Source Tests Collected by Time Destination   1 : urine from left kidney for culture Urine Cystoscopic Urine CULTURE, URINE Tommy Houser MD 9/13/2017 1110 Microbiology      Findings: inflammation, mild hydro  Complications: 0  Implants:   Implant Name Type Inv.  Item Serial No.  Lot No. LRB No. Used Action   STENT URET FLX SFT [id number] -- CONTOUR PERCUFLEX - SN/A   STENT URET FLX SFT [id number] -- CONTOUR PERCUFLEX N/A TRAN.SL [facility] UROLOGY-WOMENS [facility] [id number] Left 1 Implanted

## 2017-09-13 NOTE — INTERVAL H&P NOTE
H&P Update: Rut Myles was seen and examined. History and physical has been reviewed. Significant clinical changes have occurred as noted:   On cipro for uti    Signed By: Enrrique Harris MD     September 13, 2017 10:30 AM

## 2017-09-13 NOTE — DISCHARGE INSTRUCTIONS
DISCHARGE SUMMARY from Your Nurse    PATIENT INSTRUCTIONS:    AFTER ANESTHESIA & SEDATION, and WHILE TAKING PAIN MEDICINE  After general anesthesia / intravenous sedation and the 24 hours following, and/or while taking prescription Opiates:  · Limit your activities  · Do not drive and operate hazardous machinery until you have been of all narcotics and sedatives for over 24 hours  · Do not make important personal or business decisions  · Do  not drink alcoholic beverages  · If you have not urinated within 8 hours after discharge, please contact your surgeon on call. SIGNS OF INFECTION  Report the following Signs of Infection or General Problems after surgery to your surgeon:  · Excessive pain, swelling, redness or odor of or around the surgical area  · Temperature over 101; Temperature over 100 if on medications that affect your ability to fight infections  · Nausea and vomiting lasting longer than 4 hours or if unable to take medications  · Any signs of decreased circulation or nerve impairment to extremity: change in color, persistent  numbness, tingling, coldness or increase pain  · If you have any questions. COUGH AND DEEP BREATHE  Breathing deep and coughing are very important exercises to do after surgery. Deep breathing and coughing open the little air tubes and air sacks in your lungs. You take deep breaths every day. You may not even notice - it is just something you do when you sigh or yawn. It is a natural exercise you do to keep these air passages open. After surgery, take deep breaths and cough, on purpose. Coughing and deep breathing help prevent bronchitis and pneumonia after surgery. If you had chest or belly surgery, use a pillow as a \"hug buddy\" and hold it tightly to your chest or belly when you cough. DIRECTIONS:  1. Take 10 to 15 slow deep breaths every hour while awake. 2. Breathe in deeply, and hold it for 2 seconds.   3. Exhale slowly through puckered lips, like blowing up a balloon. 4. After every 4th or 5th deep breath, hug your pillow to your chest or belly and give a hard, deep cough. Yes, it will probably hurt. But doing this exercise is very important part of healing after surgery. Take your pain medicine to help you do this exercise without too much pain. ANKLE PUMPS    Ankle pumps increase the circulation of oxygenated blood to your lower extremities and decrease your risk for circulation problems such as blood clots. They also stretch the muscles, tendons and ligaments in your foot and ankle, and prevent joint contracture in the ankle and foot, especially after surgeries on the legs. It is important to do ankle pump exercises regularly after surgery because immobility increases your risk for developing a blood clot. Your doctor may also have you take an Aspirin for the next few days as well. If your doctor did not ask you to take an Aspirin, consult with him before starting Aspirin therapy on your own. The exercise is quite simple. · Slowly point your foot forward, feeling the muscles on the top of your lower leg stretch, and hold this position for 5 seconds. · Next, pull your foot back toward you as far as possible, stretching the calf muscles, and hold that position for 5 seconds. · Repeat with the other foot. · Perform 10 repetitions every hour while awake for both ankles if possible (down and then up with the foot once is one repetition). You should feel gentle stretching of the muscles in your lower leg when doing this exercise. If you feel pain, or your range of motion is limited, don't push too hard. Only go the limit your joint and muscles will let you go. If you have increasing pain, progressively worsening leg warmth or swelling, STOP the exercise and call your doctor.        Other Wound Care information:                        Below is information on the medication(s) your doctor is prescribing for you: The maximum daily dose of acetaminophen was discussed with the patient. She was encouraged not to exceed 3,000 mg of acetaminophen during a 24 hour period and was asked to keep in mind that acetaminophen can also be found in many over-the-counter cold medications as well as narcotics that may be given for pain. The patient expresses understanding of these issues and questions were answered. 4 THINGS ABOUT PAIN MEDICINE I ALWAYS TALK ABOUT:  There are 4 side effects I always talk about for pain medications. 1. They make you sleepy and drowsy. Do not drive a car or operate machines while taking pain medication. Do not make any major decisions. Take a nap. Relax. Let your body recover from the affects of anesthesia and surgery. 2. Some people have quite a problem with itching and. ..  3. Nausea or vomiting. I mention these together because research tends to suggest there is a gene-related issue. While some have a hard time with these problems, others do not. These are expected and know side effects. Over-the-counter Benadryl® (the drug name is diphenhydramine) can help with the itching. Your doctor may also have given you some medicine for nausea. IF HE DID NOT, CALL HIM/HER. 4. Last but not least is the problem of constipation (not suri able to have a bowel movement - poop.)  All pain medicine can slow down the movement of food through the gut. The slower it goes, the worse it can be. Frankly, this means hard poop adding insult to the injury of surgery. And if you had tummy surgery, like having your gall bladder removed or a hernia repair, YOU DO NOT WANT THIS PROBLEM. There are 4 things I recommend. · Drink lots of fluids. For healthy people with no heart problems, this means at least 64 ounces of liquids or more per day. For example, a Big Gulp® from [employer] is 32 ounces. So you need to drink at least 2  Big Gulp®'s of fluids every day.   If you have heart problems you may not be able to do this. Talk to your doctor about what you should do to prevent constipation. · Drinking fruit juice like apple, pear, or prune juice gives you extra \"BANG\" for your beverage. These drinks are high in natural fiber. If you are a diabetic, drink sugar-free fluids with fiber additives (see next 2 points.)  Avoid drinking extra fruit juice unless this is a regular part of your diet plan. · Eat extra fresh fruits and vegetables. · Add extra fiber-products. Fiber products like Metamucil®, Citrucel®, Miralax® or Benefiber® can help. These products are over-the-counter and you do not need a prescription from your doctor. If you have followed these recommendations and still have some difficulty having a good poop, take and over-the-counter stimulant like Dulcolax® (biscodyl)  or Senokot® (senna concentrate). These may help get things moving. Juan David Tripathi MEDICATION AND   SIDE EFFECT GUIDE    The [facility] MEDICATION AND SIDE EFFECT GUIDE was provided to the PATIENT AND CARE PROVIDER. Information provided includes instruction about drug purpose and common side effects for the following medications:   · NONE        Medication information added to discharge record on September 13, 2017 at 11:53 AM.      Some information we wish all of our patients to be familiar with and General Information for Healthy Lifestyle choices:    · Make a list of your current medications with your Primary Care Provider. · Update this list whenever your medications are discontinued, doses are changed, or new medications (including over-the-counter products like ibuprofen, vitamins, or herbal remedies) are added. · Carry medication information at all times in case of emergency situations      No smoking / No tobacco products / Avoid exposure to second hand smoke    Surgeon General's Warning:  Quitting smoking now greatly reduces serious risk to your health.     Obesity, smoking, and sedentary lifestyle greatly increases your risk for illness. A healthy diet, regular physical exercise & weight monitoring are important for maintaining a healthy lifestyle. A Note About Congestive Heart Failure: You may be retaining fluid if you have a history of heart failure or if you experience any of the following symptoms:      · Weight gain of 3 pounds or more overnight or 5 pounds in a week  · Increased swelling in our hands or feet  · Shortness of breath while lying flat in bed      Please call your doctor as soon as you notice any of these symptoms; do not wait until your next office visit. A Note About Strokes:  Recognize signs and symptoms of STROKE. The simple mnemonic, F.A.S.T., can help you remember signs of a stroke and what to do if you suspect a stroke is occuring to you or someone you are with:    F - Face looks uneven  A - Arms unable to move, or move evenly  S - Speech is slurred or non-existent  T - Time - CALL 911 as soon as signs and symptoms begin - DO NOT go to bed or wait to see if you get better - TIME IS BRAIN. Warning Signs of HEART ATTACK   Call 911 if you have these symptoms:     Chest discomfort. Most heart attacks involve discomfort in the center of the chest that lasts more than a few minutes, or that goes away and comes back. It can feel like uncomfortable pressure, squeezing, fullness, or pain.  Discomfort in other areas of the upper body. Symptoms can include pain or discomfort in one or both arms, the back, neck, jaw, or stomach.  Shortness of breath with or without chest discomfort.  Other signs may include breaking out in a cold sweat, nausea, or lightheadedness. Don't wait more than five minutes to call 911 - MINUTES MATTER! Fast action can save your life. Calling 911 is almost always the fastest way to get lifesaving treatment.  Emergency Medical Services staff can begin treatment when they arrive -- up to an hour sooner than if someone gets to the hospital by car. AT THE COMPLETION OF DISCHARGE INSTRUCTION REVIEW, WE VERIFY:  The discharge information has been reviewed with the patient and caregiver. Questions have been asked and answered meeting patient and caregiver expectations. The patient and caregiver verbalized understanding. Your discharge nurse was Abran MORA, RN-BC       Board Certified - Pain Management      Other information found in your discharge envelope:  []     PRESCRIPTIONS     [] Sent electronically to your pharmacy  []     PHYSICAL THERAPY PRESCRIPTION  []     APPOINTMENT CARDS  []     Regional Anesthesia Pamphlet for block or block with On-Q Catheter from Anesthesia  Service  []     Medical device information sheets/pamphlets from their    []     School/work excuse note. []     /parent work excuse note. The following personal items collected during your admission for safe keeping are returned to you:     Dental Appliance: Dental Appliances: Uppers  Vi cindy:    Hearing Aid:    Jewelry: Jewelry: None  Clothing: Clothing: Jacket/Coat, Pants, Socks, Footwear, Shirt, Undergarments  Other Valuables:  Other Valuables: None  Valuables sent to safe:

## 2017-09-13 NOTE — H&P (VIEW-ONLY)
PAT Pre-Op History & Physical    Patient: John Erwin                  MRN: [id number]          SSN: [id number]  YOB: 1955          Age: 65 y.o. Sex: female                Subjective:   Patient is a 65 y.o.  female who presents with history of left hydronephrosis that is attributed to narrowing of the ureter secondary to radiation therapy for cervical cancer. Has solitary kidney- history of right nephrectomy in 1998. Has periodic cystoscopies and stent exchange- last done 5/25/2017. Patient was treated with nasal mupirocin ointment twice daily x 5 days prior to surgery for + MRSA on nasal swab. The patient was evaluated in the surgeon's office and it was determined that the most appropriate plan of care is to proceed with surgical intervention. Patient's PCP CARRIE Kerr    Patient is accompanied by her caregiver- Lurdes Melendez- who helps answer questions. Past Medical History:   Diagnosis Date    Anemia     Chronic kidney disease     stage 3    Coronary artery disease 9/17/2014    A. Echo (9/16/14):  EF 45% with mid-distal septal/anterior HK, DD.   PASP 37.    Coronary artery disease     MI 9/2014    Depression     Diabetes ([address]     DM type 2 causing renal disease ([address]     History of nephrectomy     Right kidney    Hx MRSA infection     had negative swab [id number] but + swab on 5/16/2017    Hyperlipidemia     Hypertension     Mental retardation     Nephrolithiasis     hx    Uterine cancer ([address]     hx    UTI (lower urinary tract infection)     Vitamin D deficiency       Past Surgical History:   Procedure Laterality Date    HX CHOLECYSTECTOMY      HX DILATION AND CURETTAGE      HX HYSTERECTOMY      HX ORTHOPAEDIC      ORIF left arm, hx of fracture    HX ORTHOPAEDIC      Right ankle fx repair    HX OTHER SURGICAL  9/2014    I&D right leg wound    HX UROLOGICAL  1998    right nephrectomy    HX UROLOGICAL Left     multiple renal stents      Prior to Admission medications    Medication Sig Start Date End Date Taking? Authorizing Provider   metoprolol tartrate (LOPRESSOR) 25 mg tablet take 1/2 tablet by mouth every 12 hours 8/16/17  Yes CARRIE Zeng   glipiZIDE SR (GLUCOTROL XL) 2.5 mg CR tablet take 1 tablet by mouth twice a day 8/1/17  Yes CARRIE Zeng   atorvastatin (LIPITOR) 10 mg tablet take 1 tablet by mouth once daily TO START WHEN YOU RUN OUT OF CRESTOR 8/1/17  Yes CARRIE Zeng   tolterodine ER (DETROL-LA) 2 mg ER capsule Take 1 Cap by mouth nightly. 6/30/17  Yes CARRIE Zeng   pioglitazone (ACTOS) 30 mg tablet Take 1 Tab by mouth daily. 5/24/17  Yes CARRIE Zeng   sertraline (ZOLOFT) 50 mg tablet take 1 tablet by mouth once daily BEFORE BREAKFAST 5/10/17  Yes CARRIE Zeng   aspirin delayed-release 81 mg tablet Take 81 mg by mouth nightly. Yes Historical Provider   multivitamin with iron tablet Take 1 Tab by mouth daily. Yes Historical Provider   cyanocobalamin 1,000 mcg tablet Take 1 Tab by mouth daily. For B12 2/24/14  Yes Camryn Pabon NP     Current Outpatient Prescriptions   Medication Sig    metoprolol tartrate (LOPRESSOR) 25 mg tablet take 1/2 tablet by mouth every 12 hours    glipiZIDE SR (GLUCOTROL XL) 2.5 mg CR tablet take 1 tablet by mouth twice a day    atorvastatin (LIPITOR) 10 mg tablet take 1 tablet by mouth once daily TO START WHEN YOU RUN OUT OF CRESTOR    tolterodine ER (DETROL-LA) 2 mg ER capsule Take 1 Cap by mouth nightly.  pioglitazone (ACTOS) 30 mg tablet Take 1 Tab by mouth daily.  sertraline (ZOLOFT) 50 mg tablet take 1 tablet by mouth once daily BEFORE BREAKFAST    aspirin delayed-release 81 mg tablet Take 81 mg by mouth nightly.  multivitamin with iron tablet Take 1 Tab by mouth daily.  cyanocobalamin 1,000 mcg tablet Take 1 Tab by mouth daily. For B12     No current facility-administered medications for this encounter.        Allergies   Allergen Reactions    Hydrocodone Nausea Only    Ibuprofen Unknown (comments)     Pt cannot take because of having only one kidney    Penicillins Rash     Tolerates Cephalexin       Social History   Substance Use Topics    Smoking status: Never Smoker    Smokeless tobacco: Never Used    Alcohol use No      History   Drug Use No     Family History   Problem Relation Age of Onset    Mental Retardation Mother     Heart Disease Mother     Breast Cancer Sister     No Known Problems Brother     Malignant Hyperthermia Neg Hx     Pseudocholinesterase Deficiency Neg Hx     Delayed Awakening Neg Hx     Post-op Nausea/Vomiting Neg Hx     Emergence Delirium Neg Hx     Post-op Cognitive Dysfunction Neg Hx          Review of Systems    Patient denies difficulty swallowing, mouth sores, or loose teeth. Patient denies any recent dental procedures or any planned prior to surgery. Patient denies chest pain, tightness, pain radiating down left arm, palpitations. Denies dizziness, visual disturbances, or lightheadedness. Patient denies shortness of breath, wheezing, cough, fever, or chills. Patient denies diarrhea, constipation, or abdominal pain. Patient denies urinary problems including dysuria, hesitancy, urgency, or incontinence. Denies skin breakdown, rashes, insect bites or open area. Objective:   Patient Vitals for the past 24 hrs:   Temp Pulse Resp BP SpO2   17 1358 98.3 °F (36.8 °C) 82 18 139/72 98 %     Temp (24hrs), Av.3 °F (36.8 °C), Min:98.3 °F (36.8 °C), Max:98.3 °F (36.8 °C)    Body mass index is 35.02 kg/(m^2). Wt Readings from Last 1 Encounters:   17 67.1 kg (147 lb 14.9 oz)        Physical Exam:     General: Pleasant,  cooperative, no apparent distress, appears stated age. Eyes: Conjunctivae/corneas clear. EOMs intact.    Nose: Nares normal.   Mouth/Throat: Lips, mucosa, and tongue normal. Top dentures in place, no lower teeth- gums normal.   Neck: Supple, symmetrical, trachea midline. Back: Symmetric   Lungs: Clear to auscultation bilaterally. Heart: Regular rate and rhythm, S1, S2 normal. No murmur, click, rub or gallop. Abdomen: Soft, non-tender. Bowel sounds normal. No distention. Musculoskeletal:  Unremarkable. Extremities:  Extremities normal, atraumatic, no cyanosis or edema. Calves                                 supple, non tender to palpation. Pulses: 2+ and symmetric bilateral upper extremities. Cap. refill <2 seconds   Skin: Skin color, texture, turgor normal.  No rashes or lesions. Neurologic: CN II-XII grossly intact. Alert and oriented x3. Labs:   Recent Results (from the past 72 hour(s))   CBC WITH AUTOMATED DIFF    Collection Time: 09/06/17  2:37 PM   Result Value Ref Range    WBC 5.8 3.6 - 11.0 K/uL    RBC 3.48 (L) 3.80 - 5.20 M/uL    HGB 9.5 (L) 11.5 - 16.0 g/dL    HCT 31.3 (L) 35.0 - 47.0 %    MCV 89.9 80.0 - 99.0 FL    MCH 27.3 26.0 - 34.0 PG    MCHC 30.4 30.0 - 36.5 g/dL    RDW 15.6 (H) 11.5 - 14.5 %    PLATELET 394 511 - 077 K/uL    NEUTROPHILS 67 32 - 75 %    LYMPHOCYTES 21 12 - 49 %    MONOCYTES 9 5 - 13 %    EOSINOPHILS 2 0 - 7 %    BASOPHILS 1 0 - 1 %    ABS. NEUTROPHILS 3.9 1.8 - 8.0 K/UL    ABS. LYMPHOCYTES 1.2 0.8 - 3.5 K/UL    ABS. MONOCYTES 0.5 0.0 - 1.0 K/UL    ABS. EOSINOPHILS 0.1 0.0 - 0.4 K/UL    ABS. BASOPHILS 0.0 0.0 - 0.1 K/UL   METABOLIC PANEL, COMPREHENSIVE    Collection Time: 09/06/17  2:37 PM   Result Value Ref Range    Sodium 141 136 - 145 mmol/L    Potassium 4.8 3.5 - 5.1 mmol/L    Chloride 106 97 - 108 mmol/L    CO2 26 21 - 32 mmol/L    Anion gap 9 5 - 15 mmol/L    Glucose 98 65 - 100 mg/dL    BUN 35 (H) 6 - 20 MG/DL    Creatinine 1.38 (H) 0.55 - 1.02 MG/DL    BUN/Creatinine ratio 25 (H) 12 - 20      GFR est AA 47 (L) >60 ml/min/1.73m2    GFR est non-AA 39 (L) >60 ml/min/1.73m2    Calcium 8.9 8.5 - 10.1 MG/DL    Bilirubin, total 0.2 0.2 - 1.0 MG/DL    ALT (SGPT) 19 12 - 78 U/L    AST (SGOT) 18 15 - 37 U/L    Alk. phosphatase 134 (H) 45 - 117 U/L    Protein, total 7.5 6.4 - 8.2 g/dL    Albumin 3.1 (L) 3.5 - 5.0 g/dL    Globulin 4.4 (H) 2.0 - 4.0 g/dL    A-G Ratio 0.7 (L) 1.1 - 2.2     URINALYSIS W/ REFLEX CULTURE    Collection Time: 09/06/17  2:52 PM   Result Value Ref Range    Color YELLOW/STRAW      Appearance TURBID (A) CLEAR      Specific gravity 1.012 1.003 - 1.030      pH (UA) 6.0 5.0 - 8.0      Protein 30 (A) NEG mg/dL    Glucose NEGATIVE  NEG mg/dL    Ketone NEGATIVE  NEG mg/dL    Bilirubin NEGATIVE  NEG      Blood LARGE (A) NEG      Urobilinogen 0.2 0.2 - 1.0 EU/dL    Nitrites POSITIVE (A) NEG      Leukocyte Esterase LARGE (A) NEG      WBC >100 (H) 0 - 4 /hpf    RBC  0 - 5 /hpf    Epithelial cells FEW FEW /lpf    Bacteria 1+ (A) NEG /hpf    UA:UC IF INDICATED URINE CULTURE ORDERED (A) CNI      Granular cast 0-2 (A) NEG /lpf    WBC Cast 2-5 (A) NEG /lpf       Assessment:     Hydronephrosis    Plan:     Scheduled for cystoscopy left retrogrades left ureteral stent exchange. Labs done per surgeon's orders. MRSA swab done per anesthesia protocol. Labs reviewed- results unremarkable except H/H low (9.5/31.3). Also creatine elevated and GFR decreased (1.38/39) but seems to be patient's baseline. UA triggered C&S- results pending and MRSA results pending. EKG done 5/16/2017 reviewed in [facility]- unremarkable. Patient denies any change in cardiac status in interim.     Sandra Akbar NP

## 2017-09-13 NOTE — ANESTHESIA POSTPROCEDURE EVALUATION
Post-Anesthesia Evaluation and Assessment    Patient: Dayanara Hassan MRN: [id number]  SSN: [id number]    YOB: 1955  Age: 65 y.o. Sex: female       Cardiovascular Function/Vital Signs  Visit Vitals    /55    Pulse 99    Temp 36.4 °C (97.5 °F)    Resp 19    Ht 4' 6.5\" (1.384 m)    Wt 66.2 kg (145 lb 15.1 oz)    SpO2 96%    BMI 34.55 kg/m2       Patient is status post general anesthesia for Procedure(s):  CYSTOSCOPY WITH LEFT RETROGRADES, LEFT STENT EXCHANGE OF SOLITARY KIDNEY. Nausea/Vomiting: None    Postoperative hydration reviewed and adequate. Pain:  Pain Scale 1: Numeric (0 - 10) (09/13/17 1128)  Pain Intensity 1: 0 (09/13/17 1128)   Managed    Neurological Status:   Neuro (WDL): Within Defined Limits (09/13/17 1128)  Neuro  LUE Motor Response: Purposeful (09/13/17 1128)  LLE Motor Response: Purposeful (09/13/17 1128)  RUE Motor Response: Purposeful (09/13/17 1128)  RLE Motor Response: Purposeful (09/13/17 1128)   At baseline    Mental Status and Level of Consciousness: Arousable    Pulmonary Status:   O2 Device: Nasal cannula (09/13/17 1128)   Adequate oxygenation and airway patent    Complications related to anesthesia: None    Post-anesthesia assessment completed.  No concerns    Signed By: Britney Tidwell MD     September 13, 2017

## 2017-09-13 NOTE — IP AVS SNAPSHOT
[address] [facility] 104 1007 [facility] 
[phone] Patient: Giovanni Pizano MRN: [id number] XEQ:[id number] You are allergic to the following Allergen Reactions Hydrocodone Nausea Only Ibuprofen Unknown (comments) Pt cannot take because of having only one kidney Penicillins Rash Tolerates Cephalexin Recent Documentation Height Weight BMI OB Status Smoking Status 1.384 m 66.2 kg 34.55 kg/m2 Hysterectomy Never Smoker Unresulted Labs Order Current Status CULTURE, URINE In process Emergency Contacts Name Discharge Info Relation Home Work Mobile Nicole Dangelo DISCHARGE CAREGIVER [3] Caregiver [13] [phone] MarkmanijeannieDominic [facility]) DISCHARGE CAREGIVER [3] Caregiver [13] [phone] CAROL Alfaro  Other Relative [6] [phone] About your hospitalization You were admitted on:  September 13, 2017 You last received care in the:  [facility] PACU You were discharged on:  September 13, 2017 Unit phone number:  [phone] Why you were hospitalized Your primary diagnosis was:  Not on File Providers Seen During Your Hospitalizations Provider Role Specialty Primary office phone Rodolfo Gupta MD Attending Provider Urology [phone] Your Primary Care Physician (PCP) Primary Care Physician Office Phone Office Fax [address] [facility], [address] [phone] Follow-up Information Follow up With Details Comments Contact Info Tashia Mtz [address], [place]   [address] 
[phone] Rodolfo Gupta MD Follow up in 3 month(s)  402 [facility] 1330 350 [facility] 
[phone] Current Discharge Medication List  
  
CONTINUE these medications which have NOT CHANGED Dose & Instructions Dispensing Information Comments Morning Noon Evening Bedtime  
 aspirin delayed-release 81 mg tablet Your last dose was: Your next dose is:    
   
   
 Dose:  81 mg Take 81 mg by mouth nightly. Refills:  0  
     
   
   
   
  
 atorvastatin 10 mg tablet Commonly known as:  LIPITOR Your last dose was: Your next dose is:    
   
   
 take 1 tablet by mouth once daily TO START WHEN YOU RUN OUT OF CRESTOR Quantity:  90 Tab Refills:  1  
     
   
   
   
  
 cyanocobalamin 1,000 mcg tablet Your last dose was: Your next dose is:    
   
   
 Dose:  1000 mcg Take 1 Tab by mouth daily. For B12 Quantity:  180 Tab Refills:  1  
     
   
   
   
  
 glipiZIDE SR 2.5 mg CR tablet Commonly known as:  GLUCOTROL XL Your last dose was: Your next dose is:    
   
   
 take 1 tablet by mouth twice a day Quantity:  60 Tab Refills:  5  
     
   
   
   
  
 metoprolol tartrate 25 mg tablet Commonly known as:  LOPRESSOR Your last dose was: Your next dose is:    
   
   
 take 1/2 tablet by mouth every 12 hours Quantity:  60 Tab Refills:  3  
     
   
   
   
  
 multivitamin with iron tablet Your last dose was: Your next dose is:    
   
   
 Dose:  1 Tab Take 1 Tab by mouth daily. Refills:  0  
     
   
   
   
  
 pioglitazone 30 mg tablet Commonly known as:  ACTOS Your last dose was: Your next dose is:    
   
   
 Dose:  30 mg Take 1 Tab by mouth daily. Quantity:  30 Tab Refills:  11 sertraline 50 mg tablet Commonly known as:  ZOLOFT Your last dose was: Your next dose is:    
   
   
 take 1 tablet by mouth once daily BEFORE BREAKFAST Quantity:  90 Tab Refills:  3  
     
   
   
   
  
 tolterodine ER 2 mg ER capsule Commonly known as:  DETROL-LA Your last dose was: Your next dose is:    
   
   
 Dose:  2 mg Take 1 Cap by mouth nightly. Quantity:  30 Cap Refills:  2 Discharge Instructions DISCHARGE SUMMARY from Your Nurse PATIENT INSTRUCTIONS: 
 
AFTER ANESTHESIA & SEDATION, and WHILE TAKING PAIN MEDICINE After general anesthesia / intravenous sedation and the 24 hours following, and/or while taking prescription Opiates: · Limit your activities · Do not drive and operate hazardous machinery until you have been of all narcotics and sedatives for over 24 hours · Do not make important personal or business decisions · Do  not drink alcoholic beverages · If you have not urinated within 8 hours after discharge, please contact your surgeon on call. SIGNS OF INFECTION Report the following Signs of Infection or General Problems after surgery to your surgeon: 
· Excessive pain, swelling, redness or odor of or around the surgical area · Temperature over 101; Temperature over 100 if on medications that affect your ability to fight infections · Nausea and vomiting lasting longer than 4 hours or if unable to take medications · Any signs of decreased circulation or nerve impairment to extremity: change in color, persistent  numbness, tingling, coldness or increase pain · If you have any questions. [address] Breathing deep and coughing are very important exercises to do after surgery. Deep breathing and coughing open the little air tubes and air sacks in your lungs. You take deep breaths every day. You may not even notice - it is just something you do when you sigh or yawn. It is a natural exercise you do to keep these air passages open. After surgery, take deep breaths and cough, on purpose. Coughing and deep breathing help prevent bronchitis and pneumonia after surgery. If you had chest or belly surgery, use a pillow as a \"hug buddy\" and hold it tightly to your chest or belly when you cough.   
 
DIRECTIONS: 
 1. Take 10 to 15 slow deep breaths every hour while awake. 2. Breathe in deeply, and hold it for 2 seconds. 3. Exhale slowly through puckered lips, like blowing up a balloon. 4. After every 4th or 5th deep breath, hug your pillow to your chest or belly and give a hard, deep cough. Yes, it will probably hurt. But doing this exercise is very important part of healing after surgery. Take your pain medicine to help you do this exercise without too much pain. ANKLE PUMPS Ankle pumps increase the circulation of oxygenated blood to your lower extremities and decrease your risk for circulation problems such as blood clots. They also stretch the muscles, tendons and ligaments in your foot and ankle, and prevent joint contracture in the ankle and foot, especially after surgeries on the legs. It is important to do ankle pump exercises regularly after surgery because immobility increases your risk for developing a blood clot. Your doctor may also have you take an Aspirin for the next few days as well. If your doctor did not ask you to take an Aspirin, consult with him before starting Aspirin therapy on your own. The exercise is quite simple. · Slowly point your foot forward, feeling the muscles on the top of your lower leg stretch, and hold this position for 5 seconds. · Next, pull your foot back toward you as far as possible, stretching the calf muscles, and hold that position for 5 seconds. · Repeat with the other foot. · Perform 10 repetitions every hour while awake for both ankles if possible (down and then up with the foot once is one repetition). You should feel gentle stretching of the muscles in your lower leg when doing this exercise. If you feel pain, or your range of motion is limited, don't push too hard. Only go the limit your joint and muscles will let you go.   If you have increasing pain, progressively worsening leg warmth or swelling, STOP the exercise and call your doctor. Other Wound Care information: 
 
 
 
               
Below is information on the medication(s) your doctor is prescribing for you: The maximum daily dose of acetaminophen was discussed with the patient. She was encouraged not to exceed 3,000 mg of acetaminophen during a 24 hour period and was asked to keep in mind that acetaminophen can also be found in many over-the-counter cold medications as well as narcotics that may be given for pain. The patient expresses understanding of these issues and questions were answered. 4 THINGS ABOUT PAIN MEDICINE I ALWAYS TALK ABOUT: 
There are 4 side effects I always talk about for pain medications. 1. They make you sleepy and drowsy. Do not drive a car or operate machines while taking pain medication. Do not make any major decisions. Take a nap. Relax. Let your body recover from the affects of anesthesia and surgery. 2. Some people have quite a problem with itching and. .. 3. Nausea or vomiting. I mention these together because research tends to suggest there is a gene-related issue. While some have a hard time with these problems, others do not. These are expected and know side effects. Over-the-counter Benadryl® (the drug name is diphenhydramine) can help with the itching. Your doctor may also have given you some medicine for nausea. IF HE DID NOT, CALL HIM/HER. 4. Last but not least is the problem of constipation (not suri able to have a bowel movement - poop.)  All pain medicine can slow down the movement of food through the gut. The slower it goes, the worse it can be. Frankly, this means hard poop adding insult to the injury of surgery. And if you had tummy surgery, like having your gall bladder removed or a hernia repair, YOU DO NOT WANT THIS PROBLEM. There are 4 things I recommend. · Drink lots of fluids.   For healthy people with no heart problems, this means at least 64 ounces of liquids or more per day. For example, a Big Gulp® from [employer] is 32 ounces. So you need to drink at least 2  Big Gulp®'s of fluids every day. If you have heart problems you may not be able to do this. Talk to your doctor about what you should do to prevent constipation. · Drinking fruit juice like apple, pear, or prune juice gives you extra \"BANG\" for your beverage. These drinks are high in natural fiber. If you are a diabetic, drink sugar-free fluids with fiber additives (see next 2 points.)  Avoid drinking extra fruit juice unless this is a regular part of your diet plan. · Eat extra fresh fruits and vegetables. · Add extra fiber-products. Fiber products like Metamucil®, Citrucel®, Miralax® or Benefiber® can help. These products are over-the-counter and you do not need a prescription from your doctor. If you have followed these recommendations and still have some difficulty having a good poop, take and over-the-counter stimulant like Dulcolax® (biscodyl)  or Senokot® (senna concentrate). These may help get things moving. [address] EFFECT GUIDE The [address] EFFECT GUIDE was provided to the PATIENT AND CARE PROVIDER. Information provided includes instruction about drug purpose and common side effects for the following medications:  
· NONE Medication information added to discharge record on September 13, 2017 at 11:53 AM. Some information we wish all of our patients to be familiar with and General Information for Healthy Lifestyle choices: · Make a list of your current medications with your Primary Care Provider. · Update this list whenever your medications are discontinued, doses are changed, or new medications (including over-the-counter products like ibuprofen, vitamins, or herbal remedies) are added. · Carry medication information at all times in case of emergency situations No smoking / No tobacco products / Avoid exposure to second hand smoke Surgeon General's Warning:  Quitting smoking now greatly reduces serious risk to your health. Obesity, smoking, and sedentary lifestyle greatly increases your risk for illness. A healthy diet, regular physical exercise & weight monitoring are important for maintaining a healthy lifestyle. A Note About Congestive Heart Failure: You may be retaining fluid if you have a history of heart failure or if you experience any of the following symptoms:   
 
· Weight gain of 3 pounds or more overnight or 5 pounds in a week · Increased swelling in our hands or feet · Shortness of breath while lying flat in bed Please call your doctor as soon as you notice any of these symptoms; do not wait until your next office visit. A Note About Strokes: 
Recognize signs and symptoms of STROKE. The simple mnemonic, F.A.S.T., can help you remember signs of a stroke and what to do if you suspect a stroke is occuring to you or someone you are with: 
 
F - Face looks uneven A - Arms unable to move, or move evenly S - Speech is slurred or non-existent T - Time - CALL 911 as soon as signs and symptoms begin - DO NOT go to bed or wait to see if you get better - TIME IS BRAIN. Warning Signs of HEART ATTACK Call 911 if you have these symptoms: 
 
? Chest discomfort. Most heart attacks involve discomfort in the center of the chest that lasts more than a few minutes, or that goes away and comes back. It can feel like uncomfortable pressure, squeezing, fullness, or pain. ? Discomfort in other areas of the upper body. Symptoms can include pain or discomfort in one or both arms, the back, neck, jaw, or stomach. ? Shortness of breath with or without chest discomfort. ? Other signs may include breaking out in a cold sweat, nausea, or lightheadedness. Don't wait more than five minutes to call [address]!  Fast action can save your life. Calling 911 is almost always the fastest way to get lifesaving treatment. Emergency Medical Services staff can begin treatment when they arrive  up to an hour sooner than if someone gets to the hospital by car. AT THE COMPLETION OF DISCHARGE INSTRUCTION REVIEW, WE VERIFY: 
The discharge information has been reviewed with the patient and caregiver. Questions have been asked and answered meeting patient and caregiver expectations. The patient and caregiver verbalized understanding. Your discharge nurse was Melissa MORA, RN-BC Board Certified - Pain Management Other information found in your discharge envelope: PRESCRIPTIONS      Sent electronically to your pharmacy PHYSICAL THERAPY PRESCRIPTION 
     APPOINTMENT CARDS [facility] Pamphlet for block or block with On-Q Catheter from Anesthesia  Service Medical device information sheets/pamphlets from their  School/work excuse note. /parent work excuse note. The following personal items collected during your admission for safe keeping are returned to you:  
 
Dental Appliance: Dental Appliances: Uppers Vi cindy:   
Hearing Aid:   
Jewelry: Jewelry: None Clothing: Clothing: Jacket/Coat, Pants, Socks, Footwear, Shirt, Undergarments Other Valuables: Other Valuables: None Valuables sent to safe:   
 
 
Discharge Instructions Attachments/References URETERAL STENT PLACEMENT : POST-OP (ENGLISH) CYSTOSCOPY: POST-OP (ENGLISH) Discharge Orders None Introducing [facility]! [facility] introduces Shopping Mail patient portal. Now you can access parts of your medical record, email your doctor's office, and request medication refills online. 1. In your internet browser, go to [email or web address] 2. Click on the First Time User? Click Here link in the Sign In box. You will see the New Member Sign Up page. 3. Enter your [facility] Access Code exactly as it appears below. You will not need to use this code after youve completed the sign-up process. If you do not sign up before the expiration date, you must request a new code. · [facility] Access Code: [email or web address] Expires: 9/24/2017  1:42 PM 
 
4. Enter the last four digits of your Social Security Number (xxxx) and Date of Birth (mm/dd/yyyy) as indicated and click Submit. You will be taken to the next sign-up page. 5. Create a [facility] ID. This will be your [facility] login ID and cannot be changed, so think of one that is secure and easy to remember. 6. Create a [facility] password. You can change your password at any time. 7. Enter your Password Reset Question and Answer. This can be used at a later time if you forget your password. 8. Enter your e-mail address. You will receive e-mail notification when new information is available in [address] 9. Click Sign Up. You can now view and download portions of your medical record. 10. Click the Download Summary menu link to download a portable copy of your medical information. If you have questions, please visit the Frequently Asked Questions section of the [facility] website. Remember, [facility] is NOT to be used for urgent needs. For medical emergencies, dial 911. Now available from your iPhone and Android! General Information Please provide this summary of care documentation to your next provider. Patient Signature:  ____________________________________________________________ Date:  ____________________________________________________________  
  
[facility] Provider Signature:  ____________________________________________________________ Date:  ____________________________________________________________ More Information Ureteral Stent Placement: What to Expect at Home Your Recovery A ureteral (say \"you-REE-ter-ul\") stent is a thin, hollow tube that is placed in the ureter to help urine pass from the kidney into the bladder. Ureters are the tubes that connect the kidneys to the bladder. You may have a small amount of blood in your urine for 1 to 3 days after the procedure. While the stent is in place, you may have to urinate more often, feel a sudden need to urinate, or feel like you cannot completely empty your bladder. You may feel some pain when you urinate or do strenuous activity. You also may notice a small amount of blood in your urine after strenuous activities. These side effects usually do not prevent people from doing their normal daily activities. You may have a thin string coming out of your urethra. Your urethra is the tube that carries urine from your bladder to outside your body. This string is attached to the stent. Try not to pull on the string. The doctor will use the string to pull out the stent when you no longer need it. After the procedure, urine may flow better from your kidneys to your bladder. A ureteral stent may be left in place for several days or for as long as several months. Your doctor will take it out when you no longer need it. This care sheet gives you a general idea about how long it will take for you to recover. But each person recovers at a different pace. Follow the steps below to get better as quickly as possible. How can you care for yourself at home? Activity · Rest when you feel tired. Getting enough sleep will help you recover. · Avoid strenuous activities, such as bicycle riding, jogging, weight lifting, or aerobic exercise, until your doctor says it is okay. · Ask your doctor when you can drive again. · Most people are able to return to work the day after the procedure. If your work requires intense activity, you may feel pain in your kidney area or get tired easily.  If this happens, you may need to do less strenuous activities while the stent is in. · Ask your doctor when it is okay for you to have sex. Diet · You can eat your normal diet. If your stomach is upset, try bland, low-fat foods like plain rice, broiled chicken, toast, and yogurt. · Drink plenty of fluids (unless your doctor tells you not to). Medicines · Your doctor will tell you if and when you can restart your medicines. He or she will also give you instructions about taking any new medicines. · If you take blood thinners, such as warfarin (Coumadin), clopidogrel (Plavix), or aspirin, be sure to talk to your doctor. He or she will tell you if and when to start taking those medicines again. Make sure that you understand exactly what your doctor wants you to do. · Be safe with medicines. Take pain medicines exactly as directed. ¨ If the doctor gave you a prescription medicine for pain, take it as prescribed. ¨ If you are not taking a prescription pain medicine, ask your doctor if you can take an over-the-counter medicine. · If you think your pain medicine is making you sick to your stomach: 
¨ Take your medicine after meals (unless your doctor has told you not to). ¨ Ask your doctor for a different pain medicine. · If your doctor prescribed antibiotics, take them as directed. Do not stop taking them just because you feel better. You need to take the full course of antibiotics. Follow-up care is a key part of your treatment and safety. Be sure to make and go to all appointments, and call your doctor if you are having problems. It's also a good idea to know your test results and keep a list of the medicines you take. When should you call for help? Call 911 anytime you think you may need emergency care. For example, call if: 
· You passed out (lost consciousness). · You have severe trouble breathing. · You have sudden chest pain and shortness of breath, or you cough up blood. · You have severe belly pain. Call your doctor now or seek immediate medical care if: · Part or all of the stent comes out of your urethra. · You have pain that does not get better after you take pain medicine. · You have symptoms of a urinary infection. For example: ¨ You have blood or pus in your urine. ¨ You have pain in your back just below your rib cage. This is called flank pain. ¨ You have a fever, chills, or body aches. ¨ It hurts to urinate. ¨ You have groin or belly pain. · You cannot control when you urinate, or you leak urine. Watch closely for changes in your health, and be sure to contact your doctor if you have any problems. Where can you learn more? Go to http://antoni-jacqui.info/. Enter M223 in the search box to learn more about \"Ureteral Stent Placement: What to Expect at Home. \" Current as of: August 12, 2016 Content Version: 11.3 © 0290-6060 [employer]. Care instructions adapted under license by [employer] (which disclaims liability or warranty for this information). If you have questions about a medical condition or this instruction, always ask your healthcare professional. [place] [address] any warranty or liability for your use of this information. Cystoscopy: What to Expect at [facility] Your Recovery A cystoscopy is a procedure that lets a doctor look inside of the bladder and the urethra. The urethra is the tube that carries urine from the bladder to outside the body. The doctor uses a thin, lighted tool called a cystoscope. Your bladder is filled with fluid. This stretches the bladder so that your doctor can look closely at the inside of your bladder. After the cystoscopy, your urethra may be sore at first, and it may burn when you urinate for the first few days after the procedure. You may feel the need to urinate more often, and your urine may be pink.  These symptoms should get better in 1 or 2 days. You will probably be able to go back to most of your usual activities in 1 or 2 days. This care sheet gives you a general idea about how long it will take for you to recover. But each person recovers at a different pace. Follow the steps below to get better as quickly as possible. How can you care for yourself at home? Activity · Rest when you feel tired. Getting enough sleep will help you recover. · Try to walk each day. Start by walking a little more than you did the day before. Bit by bit, increase the amount you walk. Walking boosts blood flow and helps prevent pneumonia and constipation. · Avoid strenuous activities, such as bicycle riding, jogging, weight lifting, or aerobic exercise, until your doctor says it is okay. · Ask your doctor when you can drive again. · Most people are able to return to work within 1 or 2 days after the procedure. · You may shower and take baths as usual. 
· Ask your doctor when it is okay for you to have sex. Diet · You can eat your normal diet. If your stomach is upset, try bland, low-fat foods like plain rice, broiled chicken, toast, and yogurt. · Drink plenty of fluids (unless your doctor tells you not to). Medicines · Take pain medicines exactly as directed. ¨ If the doctor gave you a prescription medicine for pain, take it as prescribed. ¨ If you are not taking a prescription pain medicine, ask your doctor if you can take an over-the-counter medicine. · If you think your pain medicine is making you sick to your stomach: 
¨ Take your medicine after meals (unless your doctor has told you not to). ¨ Ask your doctor for a different pain medicine. · If your doctor prescribed antibiotics, take them as directed. Do not stop taking them just because you feel better. You need to take the full course of antibiotics. Follow-up care is a key part of your treatment and safety.  Be sure to make and go to all appointments, and call your doctor if you are having problems. It's also a good idea to know your test results and keep a list of the medicines you take. When should you call for help? Call 911 anytime you think you may need emergency care. For example, call if: 
· You passed out (lost consciousness). · You have severe trouble breathing. · You have sudden chest pain and shortness of breath, or you cough up blood. · You have severe belly pain. Call your doctor now or seek immediate medical care if: 
· You are sick to your stomach or cannot keep fluids down. · Your urine is still red or you see blood clots after you have urinated several times. · You have trouble passing urine or stool, especially if you have pain or swelling in your lower belly. · You have signs of a blood clot, such as: 
¨ Pain in your calf, back of the knee, thigh, or groin. ¨ Redness and swelling in your leg or groin. · You develop a fever or severe chills. · You have pain in your back just below your rib cage. This is called flank pain. Watch closely for changes in your health, and be sure to contact your doctor if: 
· You have pain or burning when you urinate. A burning feeling is normal for a day or two after the test, but call if it does not get better. · You have a frequent urge to urinate but can pass only small amounts of urine. · Your urine is pink, red, or cloudy, or smells bad. It is normal for the urine to have a pinkish color for a few days after the test, but call if it does not get better. Where can you learn more? Go to http://antoni-ajcqui.info/. Enter G378 in the search box to learn more about \"Cystoscopy: What to Expect at Home. \" Current as of: November 11, 2016 Content Version: 11.3 © 5462-3867 [employer].  Care instructions adapted under license by [employer] (which disclaims liability or warranty for this information). If you have questions about a medical condition or this instruction, always ask your healthcare professional. [place] [address] any warranty or liability for your use of this information.

## 2017-11-13 ENCOUNTER — DOCUMENTATION ONLY (OUTPATIENT)
Dept: FAMILY MEDICINE CLINIC | Age: 62
End: 2017-11-13

## 2017-11-13 NOTE — PROGRESS NOTES
Call from [facility] (who is on [address]) asking for [facility] phone number so she could call for a refill.     Hany Montanez April

## 2017-11-20 ENCOUNTER — DOCUMENTATION ONLY (OUTPATIENT)
Dept: FAMILY MEDICINE CLINIC | Age: 62
End: 2017-11-20

## 2017-11-20 NOTE — PROGRESS NOTES
Receipt of patient's PAP order of 1 bottle of Detrol LA, 2mg, 90 capsules, has been logged into the log book. Routing to the provider for dose confirmation and permission to deliver to the patient. (They usually  at [address], so it will be packaged for our clinic there next Monday, 11-27-17.) Nanci Bragg RN     tolterodine ER (DETROL-LA) 2 mg ER capsule [[id number]]   Order Details   Dose: 2 mg Route: Oral Frequency: EVERY BEDTIME   Dispense Quantity:  30 Cap Refills:  2 Fills Remaining:  --           Sig: Take 1 Cap by mouth nightly.          Written Date:  06/30/17 Expiration Date:  --     Start Date:  06/30/17 End Date:  --            Ordering Provider:  -- NATA #:  -- NPI:  --    Authorizing Provider: Mora Lewis NATA #:  [id number] NPI:  [id number]    Ordering User:   CARRIE Lewis

## 2017-11-21 NOTE — PROGRESS NOTES
Telephone call made to the patient's caregiver, Lexy Barney, to let her know that the patient's PAP Detrol will be sent to the [address] [place] [address] clinic on 11-27-17. There was no answer on her name identified voicemail, so the message was left about the patient's medication being sent to the 11-27-17 [facility] clinic.  Annmarie Castillo RN

## 2017-11-27 ENCOUNTER — CLINICAL SUPPORT (OUTPATIENT)
Dept: FAMILY MEDICINE CLINIC | Age: 62
End: 2017-11-27

## 2017-11-27 DIAGNOSIS — Z71.9 COUNSELED BY NURSE: Primary | ICD-10-CM

## 2017-11-27 NOTE — PROGRESS NOTES
Pt Cg Chuckie Dancer came to  the pt's PAP medication Detrol LA 90 capsules. 1 bottle. Ms. Martha Carrasco signed for the medications.  Caty Quirzo RN

## 2017-12-04 ENCOUNTER — TELEPHONE (OUTPATIENT)
Dept: FAMILY MEDICINE CLINIC | Age: 62
End: 2017-12-04

## 2017-12-04 NOTE — TELEPHONE ENCOUNTER
Caretaker, Eden Castillo, work number [phone], would like to talk with RN. Just switched to [facility], and prescriptions are too expensive.     Jorge Luis Rueda

## 2017-12-05 NOTE — TELEPHONE ENCOUNTER
Tc was placed to [facility] and I told her I would be in touch with Fiona Toure to verify the process that is needed for patient to receive the Actos at a discounted price, this call was made 10 min before the previous message from [facility]. (see Dilip Loya note from same thread 12/5).

## 2017-12-05 NOTE — TELEPHONE ENCOUNTER
Nico Rain from [employer] called. Actos is not available on PAP, there is a discount program available through a program that she does not handle. She has mailed the patient an application form that pt needs to fill out and mail to the drug company. Pt needs a prescription for #90/3 refills mailed to her as the [facility]. The Rx has to be for the generic form on Actos - Pioglitazone, it cannot be written for Actos, it has to be the generic med. Forwarding this message to Care A Kalani Finn call back nurse and Chuck Carmona.

## 2017-12-06 DIAGNOSIS — E11.9 TYPE 2 DIABETES MELLITUS WITHOUT COMPLICATION, WITHOUT LONG-TERM CURRENT USE OF INSULIN (HCC): ICD-10-CM

## 2017-12-06 RX ORDER — PIOGLITAZONEHYDROCHLORIDE 30 MG/1
30 TABLET ORAL DAILY
Qty: 30 TAB | Refills: 11
Start: 2017-12-06 | End: 2017-12-08 | Stop reason: SDUPTHER

## 2017-12-08 DIAGNOSIS — E11.9 TYPE 2 DIABETES MELLITUS WITHOUT COMPLICATION, WITHOUT LONG-TERM CURRENT USE OF INSULIN (HCC): ICD-10-CM

## 2017-12-08 RX ORDER — PIOGLITAZONEHYDROCHLORIDE 30 MG/1
30 TABLET ORAL DAILY
Qty: 30 TAB | Refills: 11 | Status: SHIPPED | OUTPATIENT
Start: 2017-12-08 | End: 2018-07-17

## 2017-12-08 RX ORDER — PIOGLITAZONEHYDROCHLORIDE 30 MG/1
30 TABLET ORAL DAILY
Qty: 30 TAB | Refills: 11 | Status: SHIPPED | OUTPATIENT
Start: 2017-12-08 | End: 2017-12-08 | Stop reason: SDUPTHER

## 2017-12-08 NOTE — PROGRESS NOTES
Dr Cosmo Macias printed and signed the Rx for generic ACTOS, I placed it in mail basket for  to take from [facility] for mail processing. Closing encounter.

## 2018-01-23 DIAGNOSIS — E78.00 HIGH CHOLESTEROL: ICD-10-CM

## 2018-01-23 RX ORDER — ATORVASTATIN CALCIUM 10 MG/1
TABLET, FILM COATED ORAL
Qty: 90 TAB | Refills: 1 | Status: SHIPPED | OUTPATIENT
Start: 2018-01-23 | End: 2018-03-26 | Stop reason: SDUPTHER

## 2018-01-23 NOTE — TELEPHONE ENCOUNTER
Phone message left on legal caretaker, Tre Guzman that refill was sent to pharmacy today for Atorvastatin. Also pt LOV was 6/2017 and needs to be seen for f/u. Scheduled for March 3/26/18 in [facility] clinic location and in am for ease of legal caretaker schedule. Appt confirmation printed and placed in mail.

## 2018-01-23 NOTE — TELEPHONE ENCOUNTER
She needs to be seen in the next few months, her last appt was 6/17. She is an [place] pt.  Thank you

## 2018-02-14 ENCOUNTER — TELEPHONE (OUTPATIENT)
Dept: FAMILY MEDICINE CLINIC | Age: 63
End: 2018-02-14

## 2018-02-14 NOTE — TELEPHONE ENCOUNTER
Per reply email from [facility] today, she has not received a call on the patient's behalf requesting a refill for the Detrol LA. She will order it today. She also stated that this is the last refill until the PAP application is renewed with updated financial paperwork. Specifically, she needs a copy of the patient's 's 2017 Social Security and Pension statements. Return call made to the patient's caregiver, Mike Casillas, [phone]. She stated that she had left a message for [employer] requesting the refill. Reviewed Milagros's message with her. Senia Tyler stated she would fax the needed financial paperwork directly to [employer] and she was given Milagros's fax number, [phone]. She also understands that the [facility] will call her when the patient's PAP medication has arrived and been approved for delivery.  Jayleen Helms RN

## 2018-02-14 NOTE — TELEPHONE ENCOUNTER
Saloni Hugo ([facility]) left message that patient will need a refill of Detrol LA, and she would like to pick it up at [place] on 2/26. Please call her at either [phone] or [phone].     Petar Batres April

## 2018-02-14 NOTE — TELEPHONE ENCOUNTER
At this time, the [facility] has not received a PAP order of Earnest CLINE for the patient. Email sent to [facility] asking if it was on the way to us.  Gavin Han RN

## 2018-02-19 ENCOUNTER — DOCUMENTATION ONLY (OUTPATIENT)
Dept: FAMILY MEDICINE CLINIC | Age: 63
End: 2018-02-19

## 2018-02-19 NOTE — PROGRESS NOTES
Receipt of patient's PAP medication, 1 bottle of Detrol LA 2mg capsules, 90 caps, has been logged into the log book. Routing to the provider for dose confirmation and permission to deliver to the patient. Nanci Prabhakar RN    tolterodine ER (DETROL-LA) 2 mg ER capsule [[id number]]   Order Details   Dose: 2 mg Route: Oral Frequency: EVERY BEDTIME   Dispense Quantity:  30 Cap Refills:  2 Fills Remaining:  --           Sig: Take 1 Cap by mouth nightly.          Written Date:  06/30/17 Expiration Date:  --     Start Date:  06/30/17 End Date:  --            Ordering Provider:  -- NATA #:  -- NPI:  --    Authorizing Provider: Mora Herndon NATA #:  [id number] NPI:  [id number]    Ordering User:   CARRIE Herndon

## 2018-02-21 NOTE — PROGRESS NOTES
Telephone call made to the patient's caregiver, Dl Crook (formerly Mellisa). There was no answer at her cell number  so a message was left on her name identified voicemail stating that the [facility] has received a bottle of Detrol LA for Edel Bowling and will have it at the 02-26-18 [facility] as Jamila Koenig previously requested. The message also asked her to please call the [facility] office if she needed to change the delivery date.  April Acosta RN

## 2018-02-26 ENCOUNTER — CLINICAL SUPPORT (OUTPATIENT)
Dept: FAMILY MEDICINE CLINIC | Age: 63
End: 2018-02-26

## 2018-02-26 DIAGNOSIS — Z71.9 COUNSELED BY NURSE: Primary | ICD-10-CM

## 2018-02-27 ENCOUNTER — APPOINTMENT (OUTPATIENT)
Dept: CT IMAGING | Age: 63
End: 2018-02-27
Attending: EMERGENCY MEDICINE
Payer: SELF-PAY

## 2018-02-27 ENCOUNTER — HOSPITAL ENCOUNTER (EMERGENCY)
Age: 63
Discharge: HOME | End: 2018-02-27
Attending: EMERGENCY MEDICINE
Payer: SELF-PAY

## 2018-02-27 ENCOUNTER — APPOINTMENT (OUTPATIENT)
Dept: GENERAL RADIOLOGY | Age: 63
End: 2018-02-27
Attending: EMERGENCY MEDICINE
Payer: SELF-PAY

## 2018-02-27 ENCOUNTER — PATIENT OUTREACH (OUTPATIENT)
Dept: FAMILY MEDICINE CLINIC | Age: 63
End: 2018-02-27

## 2018-02-27 VITALS
HEIGHT: 55 IN | SYSTOLIC BLOOD PRESSURE: 128 MMHG | OXYGEN SATURATION: 96 % | RESPIRATION RATE: 17 BRPM | DIASTOLIC BLOOD PRESSURE: 62 MMHG | BODY MASS INDEX: 34.44 KG/M2 | TEMPERATURE: 97.9 F | HEART RATE: 98 BPM | WEIGHT: 148.81 LBS

## 2018-02-27 DIAGNOSIS — S00.93XA CONTUSION OF HEAD, UNSPECIFIED PART OF HEAD, INITIAL ENCOUNTER: ICD-10-CM
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
## 2018-02-27 DIAGNOSIS — S43.004A SHOULDER DISLOCATION, RIGHT, INITIAL ENCOUNTER: ICD-10-CM

## 2018-02-27 PROCEDURE — 96374 THER/PROPH/DIAG INJ IV PUSH: CPT

## 2018-02-27 PROCEDURE — 74011250637 HC RX REV CODE- 250/637: Performed by: EMERGENCY MEDICINE

## 2018-02-27 PROCEDURE — L3650 SO 8 ABD RESTRAINT PRE OTS: HCPCS

## 2018-02-27 PROCEDURE — 73060 X-RAY EXAM OF HUMERUS: CPT

## 2018-02-27 PROCEDURE — 70450 CT HEAD/BRAIN W/O DYE: CPT

## 2018-02-27 PROCEDURE — 75810000301 HC ER LEVEL 1 CLOSED TREATMNT FRACTURE/DISLOCATION

## 2018-02-27 PROCEDURE — 73030 X-RAY EXAM OF SHOULDER: CPT

## 2018-02-27 PROCEDURE — 99284 EMERGENCY DEPT VISIT MOD MDM: CPT

## 2018-02-27 PROCEDURE — 96375 TX/PRO/DX INJ NEW DRUG ADDON: CPT

## 2018-02-27 PROCEDURE — 99152 MOD SED SAME PHYS/QHP 5/>YRS: CPT

## 2018-02-27 PROCEDURE — 74011250636 HC RX REV CODE- 250/636: Performed by: EMERGENCY MEDICINE

## 2018-02-27 RX ORDER — SODIUM CHLORIDE 9 MG/ML
75 INJECTION, SOLUTION INTRAVENOUS CONTINUOUS
Status: DISCONTINUED | OUTPATIENT
Start: 2018-02-27 | End: 2018-02-27 | Stop reason: HOSPADM

## 2018-02-27 RX ORDER — ACETAMINOPHEN 325 MG/1
650 TABLET ORAL
Status: COMPLETED | OUTPATIENT
Start: 2018-02-27 | End: 2018-02-27

## 2018-02-27 RX ORDER — MORPHINE SULFATE 2 MG/ML
2 INJECTION, SOLUTION INTRAMUSCULAR; INTRAVENOUS
Status: COMPLETED | OUTPATIENT
Start: 2018-02-27 | End: 2018-02-27

## 2018-02-27 RX ORDER — ONDANSETRON 2 MG/ML
4 INJECTION INTRAMUSCULAR; INTRAVENOUS
Status: COMPLETED | OUTPATIENT
Start: 2018-02-27 | End: 2018-02-27

## 2018-02-27 RX ORDER — PROPOFOL 10 MG/ML
INJECTION, EMULSION INTRAVENOUS
Status: DISCONTINUED
Start: 2018-02-27 | End: 2018-02-27 | Stop reason: HOSPADM

## 2018-02-27 RX ORDER — SODIUM CHLORIDE 0.9 % (FLUSH) 0.9 %
5-10 SYRINGE (ML) INJECTION EVERY 8 HOURS
Status: DISCONTINUED | OUTPATIENT
Start: 2018-02-27 | End: 2018-02-27 | Stop reason: HOSPADM

## 2018-02-27 RX ORDER — SODIUM CHLORIDE 0.9 % (FLUSH) 0.9 %
5-10 SYRINGE (ML) INJECTION AS NEEDED
Status: DISCONTINUED | OUTPATIENT
Start: 2018-02-27 | End: 2018-02-27 | Stop reason: HOSPADM

## 2018-02-27 RX ORDER — PROPOFOL 10 MG/ML
200 INJECTION, EMULSION INTRAVENOUS
Status: COMPLETED | OUTPATIENT
Start: 2018-02-27 | End: 2018-02-27

## 2018-02-27 RX ADMIN — MORPHINE SULFATE 2 MG: 2 INJECTION, SOLUTION INTRAMUSCULAR; INTRAVENOUS at 03:25

## 2018-02-27 RX ADMIN — ACETAMINOPHEN 650 MG: 325 TABLET ORAL at 01:59

## 2018-02-27 RX ADMIN — SODIUM CHLORIDE 500 ML: 900 INJECTION, SOLUTION INTRAVENOUS at 04:37

## 2018-02-27 RX ADMIN — ONDANSETRON 4 MG: 2 INJECTION INTRAMUSCULAR; INTRAVENOUS at 03:25

## 2018-02-27 RX ADMIN — Medication 10 ML: at 03:35

## 2018-02-27 RX ADMIN — SODIUM CHLORIDE 75 ML/HR: 900 INJECTION, SOLUTION INTRAVENOUS at 03:35

## 2018-02-27 RX ADMIN — PROPOFOL 150 MG: 10 INJECTION, EMULSION INTRAVENOUS at 03:48

## 2018-02-27 NOTE — CONSULTS
Orthopedic History and Physical     Patient: José Miguel An MRN: [id number]  SSN: [id number]    YOB: 1955  Age: 59 y.o. Sex: female          Subjective:     Patient is a 59 y.o.  female who complains of right shoulder pain. Pt with hx of mental retardation, UTI, CKD, ARF, CAD, hyperlipidemia, nephrectomy, DM. Pt was got up out of bed and tripped over a laundry basket. She landed on her right side. She was unable to move her right arm. She presented to ER, radiographs reveal right shoulder anterior inferior dislocation. Pt is right hand dominant. Patient Active Problem List    Diagnosis Date Noted    UTI (urinary tract infection) 01/13/2017    CKD (chronic kidney disease), stage III 01/13/2017    Candiduria 12/01/2016    Acute renal failure (ARF) ([address] 11/28/2016    Ureteral obstruction, left 01/05/2016    Coronary artery disease 09/17/2014    Thrombocytopenia ([address] 76/88/0039    Complicated UTI (urinary tract infection) 06/22/2014    Hyperlipidemia     Mental retardation     History of nephrectomy     H/O Nephrolithiasis     DM (diabetes mellitus) ([address]      Past Medical History:   Diagnosis Date    Anemia     Chronic kidney disease     stage 3    Coronary artery disease 9/17/2014    A. Echo (9/16/14):  EF 45% with mid-distal septal/anterior HK, DD.   PASP 37.    Coronary artery disease     MI 9/2014    Depression     Diabetes ([address]     DM type 2 causing renal disease ([address]     History of nephrectomy     Right kidney    Hx MRSA infection     had negative swab [id number] but + swab on 5/16/2017    Hyperlipidemia     Hypertension     Mental retardation     Nephrolithiasis     hx    Uterine cancer ([address]     hx    UTI (lower urinary tract infection)     Vitamin D deficiency       Past Surgical History:   Procedure Laterality Date    HX CHOLECYSTECTOMY      HX DILATION AND CURETTAGE      HX HYSTERECTOMY      HX ORTHOPAEDIC      ORIF left arm, hx of fracture    HX ORTHOPAEDIC      Right ankle fx repair    HX OTHER SURGICAL  9/2014    I&D right leg wound    HX UROLOGICAL  1998    right nephrectomy    HX UROLOGICAL Left     multiple renal stents      Prior to Admission medications    Medication Sig Start Date End Date Taking? Authorizing Provider   atorvastatin (LIPITOR) 10 mg tablet take 1 tablet by mouth once daily 1/23/18   CARRIE Zeng   pioglitazone (ACTOS) 30 mg tablet Take 1 Tab by mouth daily. Please provide generic only 12/8/17   Gloria Iverson MD   metoprolol tartrate (LOPRESSOR) 25 mg tablet take 1/2 tablet by mouth every 12 hours 8/16/17   [facility] CARRIE Becker   glipiZIDE SR (GLUCOTROL XL) 2.5 mg CR tablet take 1 tablet by mouth twice a day 8/1/17   CARRIE Mixon   tolterodine ER (DETROL-LA) 2 mg ER capsule Take 1 Cap by mouth nightly. 6/30/17   CARRIE Zeng   sertraline (ZOLOFT) 50 mg tablet take 1 tablet by mouth once daily BEFORE BREAKFAST 5/10/17   CARRIE Zeng   aspirin delayed-release 81 mg tablet Take 81 mg by mouth nightly. Historical Provider   multivitamin with iron tablet Take 1 Tab by mouth daily. Historical Provider   cyanocobalamin 1,000 mcg tablet Take 1 Tab by mouth daily. For B12 2/24/14   Cameron Hu NP     Current Facility-Administered Medications   Medication Dose Route Frequency    sodium chloride (NS) flush 5-10 mL  5-10 mL IntraVENous Q8H    sodium chloride (NS) flush 5-10 mL  5-10 mL IntraVENous PRN    0.9% sodium chloride infusion  75 mL/hr IntraVENous CONTINUOUS    sodium chloride 0.9 % bolus infusion 500 mL  500 mL IntraVENous ONCE     Current Outpatient Prescriptions   Medication Sig    atorvastatin (LIPITOR) 10 mg tablet take 1 tablet by mouth once daily    pioglitazone (ACTOS) 30 mg tablet Take 1 Tab by mouth daily.  Please provide generic only    metoprolol tartrate (LOPRESSOR) 25 mg tablet take 1/2 tablet by mouth every 12 hours    glipiZIDE SR (GLUCOTROL XL) 2.5 mg CR tablet take 1 tablet by mouth twice a day    tolterodine ER (DETROL-LA) 2 mg ER capsule Take 1 Cap by mouth nightly.  sertraline (ZOLOFT) 50 mg tablet take 1 tablet by mouth once daily BEFORE BREAKFAST    aspirin delayed-release 81 mg tablet Take 81 mg by mouth nightly.  multivitamin with iron tablet Take 1 Tab by mouth daily.  cyanocobalamin 1,000 mcg tablet Take 1 Tab by mouth daily. For B12      Allergies   Allergen Reactions    Hydrocodone Nausea Only    Ibuprofen Unknown (comments)     Pt cannot take because of having only one kidney    Penicillins Rash     Tolerates Cephalexin       Social History   Substance Use Topics    Smoking status: Never Smoker    Smokeless tobacco: Never Used    Alcohol use No      Family History   Problem Relation Age of Onset    Mental Retardation Mother     Heart Disease Mother     Breast Cancer Sister     No Known Problems Brother     Malignant Hyperthermia Neg Hx     Pseudocholinesterase Deficiency Neg Hx     Delayed Awakening Neg Hx     Post-op Nausea/Vomiting Neg Hx     Emergence Delirium Neg Hx     Post-op Cognitive Dysfunction Neg Hx         Review of Systems  A comprehensive review of systems was negative except for that written in the HPI. Objective:     Patient Vitals for the past 1 hrs:   BP Pulse Resp SpO2   18 0355 145/82 79 19 95 %   18 0350 - 87 17 94 %   18 0345 122/69 93 16 99 %   18 0340 - 98 20 99 %   18 0335 - 86 20 99 %   18 0331 - 92 16 96 %   18 0330 123/88 92 14 96 %   18 0325 (!) 132/96 - - 96 %     Temp (24hrs), Av.9 °F (36.6 °C), Min:97.9 °F (36.6 °C), Max:97.9 °F (36.6 °C)      EXAM:   Physical Exam:   Patient appears generally well, mood and affect are appropriate and pleasant. Extremities: Right shoulder with obvious anterior dislocation. Able to move right hand, RP +2. No tingling/ numbness. Vascular: 2+ dorsalis pedis pulses bilaterally.     Imaging Review    Imaging Review: CLINICAL HISTORY: Right shoulder pain     FINDINGS:     AP and oblique views of the right shoulder  Visualized osseous structures demonstrate anteroinferior dislocation. .   There is no significant soft tissue abnormality identified.     IMPRESSION  IMPRESSION:     Right glenohumeral anteroinferior dislocation. Labs: No results found for this or any previous visit (from the past 12 hour(s)). Assessment:     Patient Active Problem List    Diagnosis Date Noted    UTI (urinary tract infection) 01/13/2017    CKD (chronic kidney disease), stage III 01/13/2017    Candiduria 12/01/2016    Acute renal failure (ARF) [facility] 11/28/2016    Ureteral obstruction, left 01/05/2016    Coronary artery disease 09/17/2014    Thrombocytopenia ([facility] 41/65/0133    Complicated UTI (urinary tract infection) 06/22/2014    Hyperlipidemia     Mental retardation     History of nephrectomy     H/O Nephrolithiasis     DM (diabetes mellitus) ([facility]          Plan:   Right anterior shoulder dislocation  Discussed with patient need for shoulder reduction with conscious sedation. Consents for closed reduction and conscious sedation signed. Pt. Understands Proceeded with conscious sedation provided by ER MD and tolerated well. Achieved appropriate sedation. Along with traction and countertraction, reduced right shoulder. No audible clunk, however, no further deformity. No sulcus sign. Pt. Moving arm without problems when waking up from anesthesia. Pt. Immobilized with shoulder sling. Post reduction films reveal: improved alignment and appropriate reduction     Discussed case with Josie and will follow-up in the office in one week.      Vivi Toscano NP  Orthopaedic Nurse Practitioner    [address] (P:  [phone])

## 2018-02-27 NOTE — ED PROVIDER NOTES
HPI Comments: 59 y.o. female with past medical history significant for MR, HLD, anemia, uterine CA s/p hysterectomy, DMTII, HTN, CAD with h/o MI, stage 3 CKD, s/p right nephrectomy, who presents to the ED accompanied by caretaker, with chief complaint of pain to the right upper arm following a fall that occurred at 0030 this morning. Caretaker states that patient lives at home with her , while the caretaker lives in an adjacent apartment. Pt had an unwitnessed fall early this morning when she tripped over a laundry basket in the laundry room of her home. Nanci Rhea and hit her head, and there is a contusion noted to the forehead. However, pt denies any LOC associated with the fall. Pt's main complaint at this time is pain in the right upper arm, which is \"aching\" in quality. She ranks her pain level as an 8/10 in severity at this time. Caretaker reports that she had EMS come to the house to evaluate the patient, and they recommended that caretaker take the patient to the ED to rule out fracture. History is overall limited due to pt's history of developmental delay. Social hx: Negative for Tobacco use; Negative for EtOH use; Negative for Illicit Drug Abuse    Patient lives at home with . Has a caretaker. PCP: CARRIE Randall    Full history, physical exam, and ROS unable to be obtained due to:        Note written by Hood Gordon. Valeriy Ruiz, as dictated by Natali Acosta,  1:50 AM     The history is provided by the patient and a caregiver. The history is limited by a developmental delay. No  was used. Past Medical History:   Diagnosis Date    Anemia     Chronic kidney disease     stage 3    Coronary artery disease 9/17/2014    A. Echo (9/16/14):  EF 45% with mid-distal septal/anterior HK, DD.   PASP 37.    Coronary artery disease     MI 9/2014    Depression     Diabetes [facility]     DM type 2 causing renal disease ([facility]     History of nephrectomy Right kidney    Hx MRSA infection     had negative swab [id number] but + swab on 5/16/2017    Hyperlipidemia     Hypertension     Mental retardation     Nephrolithiasis     hx    Uterine cancer ([facility] [address]     hx    UTI (lower urinary tract infection)     Vitamin D deficiency        Past Surgical History:   Procedure Laterality Date    HX CHOLECYSTECTOMY      HX DILATION AND CURETTAGE      HX HYSTERECTOMY      HX ORTHOPAEDIC      ORIF left arm, hx of fracture    HX ORTHOPAEDIC      Right ankle fx repair    HX OTHER SURGICAL  9/2014    I&D right leg wound    HX UROLOGICAL  1998    right nephrectomy    HX UROLOGICAL Left     multiple renal stents         Family History:   Problem Relation Age of Onset    Mental Retardation Mother     Heart Disease Mother     Breast Cancer Sister     No Known Problems Brother     Malignant Hyperthermia Neg Hx     Pseudocholinesterase Deficiency Neg Hx     Delayed Awakening Neg Hx     Post-op Nausea/Vomiting Neg Hx     Emergence Delirium Neg Hx     Post-op Cognitive Dysfunction Neg Hx        Social History     Social History    Marital status:      Spouse name: N/A    Number of children: N/A    Years of education: N/A     Occupational History    Not on file. Social History Main Topics    Smoking status: Never Smoker    Smokeless tobacco: Never Used    Alcohol use No    Drug use: No    Sexual activity: Not on file     Other Topics Concern    Not on file     Social History Narrative     ALLERGIES: Hydrocodone; Ibuprofen; and Penicillins    Review of Systems   Unable to perform ROS: Other (h/o MR)       Vitals:    02/27/18 0138 02/27/18 0142   BP: (!) 155/93 (!) 155/93   Pulse: 85 85   Resp: 16 16   Temp: 97.9 °F (36.6 °C) 97.9 °F (36.6 °C)   SpO2: 92% 95%   Weight: 67.5 kg (148 lb 13 oz) 67.5 kg (148 lb 13 oz)   Height:  4' 6\" (1.372 m)            Physical Exam   Constitutional: She appears well-developed and well-nourished. No distress.    HENT: Head: Normocephalic. Head is with contusion (3 cm contusion to the right forehead). Right Ear: External ear normal.   Left Ear: External ear normal.   Nose: Nose normal.   Mouth/Throat: Oropharynx is clear and moist. No oropharyngeal exudate. Eyes: Conjunctivae and EOM are normal. Pupils are equal, round, and reactive to light. Right eye exhibits no discharge. Left eye exhibits no discharge. No scleral icterus. Neck: Normal range of motion. Neck supple. No JVD present. No tracheal deviation present. Cardiovascular: Normal rate, regular rhythm, normal heart sounds and intact distal pulses. Exam reveals no gallop and no friction rub. No murmur heard. Pulmonary/Chest: Effort normal and breath sounds normal. No stridor. No respiratory distress. She has no decreased breath sounds. She has no wheezes. She has no rhonchi. She has no rales. She exhibits no tenderness. Abdominal: Soft. Bowel sounds are normal. She exhibits no distension. There is no tenderness. There is no rebound and no guarding. Musculoskeletal: She exhibits tenderness. She exhibits no edema. Right shoulder: She exhibits decreased range of motion, tenderness and deformity (obvious dislocation). RUE: tenderness to the proximal humerus; RUE NVI   Neurological: She is alert. She has normal strength and normal reflexes. No cranial nerve deficit or sensory deficit. She exhibits normal muscle tone. Coordination normal. GCS eye subscore is 4. GCS verbal subscore is 5. GCS motor subscore is 6. Skin: Skin is warm and dry. No rash noted. She is not diaphoretic. No erythema. No pallor. Psychiatric: She has a normal mood and affect. Her behavior is normal. Judgment and thought content normal.   Nursing note and vitals reviewed. Note written by Willie Carr. Smita Carvajal, as dictated by Riccardo Jason, DO 1:50 AM       MDM  Number of Diagnoses or Management Options  Contusion of head, unspecified part of head, initial encounter:   Fall, initial encounter:   Shoulder dislocation, right, initial encounter:      Amount and/or Complexity of Data Reviewed  Tests in the radiology section of CPT®: ordered and reviewed  Discuss the patient with other providers: yes (Ortho)    Risk of Complications, Morbidity, and/or Mortality  Presenting problems: moderate  Diagnostic procedures: low  Management options: moderate    Patient Progress  Patient progress: stable        ED Course       Procedural Sedation  Date/Time: 2018 3:45 AM  Performed by: Agatha Stokes by: Nanci Arellano     Consent:     Consent obtained:  Written    Consent given by:  Patient    Risks discussed:  Inadequate sedation, nausea, vomiting, respiratory compromise necessitating ventilatory assistance and intubation, prolonged hypoxia resulting in organ damage, prolonged sedation necessitating reversal, allergic reaction and dysrhythmia    Alternatives discussed:  Analgesia without sedation  Indications:     Procedure performed:  Imaging studies    Procedure necessitating sedation performed by:  Different physician    Intended level of sedation:  Moderate (conscious sedation)  Pre-sedation assessment:     Time since last food or drink:  4 hrs    ASA classification: class 2 - patient with mild systemic disease      Neck mobility: normal      Mouth openin finger widths    Thyromental distance:  3 finger widths    Mallampati score:  II - soft palate, uvula, fauces visible    Pre-sedation assessments completed and reviewed: airway patency, cardiovascular function, hydration status, mental status, nausea/vomiting, pain level, respiratory function and temperature      History of difficult intubation: no      Pre-sedation assessment completed:  2018 3:40 AM  Immediate pre-procedure details:     Reassessment: Patient reassessed immediately prior to procedure      Reviewed: vital signs and NPO status      Verified: bag valve mask available, emergency equipment available, intubation equipment available, IV patency confirmed, oxygen available, reversal medications available and suction available    Procedure details (see MAR for exact dosages):     Sedation start time:  2/27/2018 3:45 AM    Preoxygenation:  Nasal cannula    Sedation:  Propofol (140 mg; 30 mg bolus then in 10 mg incriments )    Intra-procedure monitoring:  Blood pressure monitoring, cardiac monitor, continuous pulse oximetry, continuous capnometry, frequent LOC assessments and frequent vital sign checks    Intra-procedure events: none      Sedation end time:  2/27/2018 4:05 AM  Post-procedure details:     Post-sedation assessment completed:  2/27/2018 4:05 AM    Attendance: Constant attendance by certified staff until patient recovered      Recovery: Patient returned to pre-procedure baseline      Estimated blood loss (see I/O flowsheets): no      Post-sedation assessments completed and reviewed: airway patency, cardiovascular function, hydration status, mental status, nausea/vomiting, pain level, respiratory function and temperature      Specimens recovered:  None    Patient is stable for discharge or admission: yes      Patient tolerance: Tolerated well, no immediate complications          CONSULT NOTE:  2:50 AM Iris Tierney DO communicated via [facility] Text with Dr. Javid Lane and Rajeev Mott NP, Consults for Orthopedics. Discussed available diagnostic tests and clinical findings. Rajeev Mott NP will evaluate the patient in the ED and assist with joint reduction. Dr. Javid Lane is in agreement with plan. Chief Complaint   Patient presents with   [facility] Fall       The patient's presenting problems have been discussed, and they are in agreement with the care plan formulated and outlined with them. I have encouraged them to ask questions as they arise throughout their visit.     MEDICATIONS GIVEN:  Medications   sodium chloride (NS) flush 5-10 mL ( IntraVENous Canceled Entry 2/27/18 0600)   sodium chloride (NS) flush 5-10 mL (not administered)   0.9% sodium chloride infusion (0 mL/hr IntraVENous IV Completed 2/27/18 0502)   acetaminophen (TYLENOL) tablet 650 mg (650 mg Oral Given 2/27/18 0159)   morphine injection 2 mg (2 mg IntraVENous Given 2/27/18 0325)   ondansetron (ZOFRAN) injection 4 mg (4 mg IntraVENous Given 2/27/18 0325)   propofol (DIPRIVAN) 10 mg/mL injection 200 mg (150 mg IntraVENous Given by Provider 2/27/18 0348)   sodium chloride 0.9 % bolus infusion 500 mL (0 mL IntraVENous IV Completed 2/27/18 0502)       LABS REVIEWED:  No results found for this or any previous visit (from the past 24 hour(s)). VITAL SIGNS:  Patient Vitals for the past 12 hrs:   Temp Pulse Resp BP SpO2   02/27/18 0455 - 98 17 128/62 96 %   02/27/18 0450 - 100 23 122/67 96 %   02/27/18 0445 - 98 16 128/57 97 %   02/27/18 0440 - 93 23 123/75 98 %   02/27/18 0435 - 95 19 127/61 97 %   02/27/18 0430 - 88 13 126/57 -   02/27/18 0425 - 86 15 106/64 -   02/27/18 0420 - 88 16 123/63 -   02/27/18 0417 - 92 22 119/57 -   02/27/18 0415 - 92 18 123/81 -   02/27/18 0410 - 91 20 - 100 %   02/27/18 0405 - 89 19 110/66 96 %   02/27/18 0402 - 87 19 93/55 96 %   02/27/18 0400 - - - (!) 88/36 -   02/27/18 0355 - 79 19 145/82 95 %   02/27/18 0350 - 87 17 - 94 %   02/27/18 0345 - 93 16 122/69 99 %   02/27/18 0340 - 98 20 - 99 %   02/27/18 0335 - 86 20 - 99 %   02/27/18 0331 - 92 16 - 96 %   02/27/18 0330 - 92 14 123/88 96 %   02/27/18 0325 - - - (!) 132/96 96 %   02/27/18 0142 97.9 °F (36.6 °C) 85 16 (!) 155/93 95 %   02/27/18 0138 97.9 °F (36.6 °C) 85 16 (!) 155/93 92 %       RADIOLOGY RESULTS:  The following have been ordered and reviewed:  Xr Humerus Rt    Result Date: 2/27/2018  CLINICAL HISTORY: Right shoulder pain FINDINGS: AP and oblique views of the right shoulder Visualized osseous structures demonstrate anteroinferior dislocation. . There is no significant soft tissue abnormality identified.      IMPRESSION: Right glenohumeral anteroinferior dislocation. Ct Head Wo Cont    Result Date: 2/27/2018  EXAM:  CT HEAD WO CONT Clinical history: Fall INDICATION:   fall COMPARISON: None. CONTRAST:  None. TECHNIQUE: Unenhanced CT of the head was performed using 5 mm images. Brain and bone windows were generated. CT dose reduction was achieved through use of a standardized protocol tailored for this examination and automatic exposure control for dose modulation. FINDINGS: There is motion and streak artifact. . Sulcal and ventricular prominence. Basal ganglia calcifications. Minimal soft tissue edema overlying the right frontal bone. . There is no intracranial hemorrhage, extra-axial collection, mass, mass effect or midline shift. The basilar cisterns are open. No acute infarct is identified. The bone windows demonstrate no abnormalities. The visualized portions of the paranasal sinuses and mastoid air cells are clear. IMPRESSION: No acute intracranial process     PROCEDURES:  Procedural sedation    PROGRESS NOTES:  Discussed results and plan with patient. Patient will be discharged home with ortho and PCP followup. Patient instructed to return to the emergency room for any worsening symptoms or any other concerns. DIAGNOSIS:    1. Fall, initial encounter    2. Contusion of head, unspecified part of head, initial encounter    3.  Shoulder dislocation, right, initial encounter        PLAN:  Follow-up Information     Follow up With Details Comments [address], DO Schedule an appointment as soon as possible for a visit  [address] [place] [address] [id number] [place] [address] David Frederick In 1 week  [id number]  [phone]      [facility] EMERGENCY DEPT  If symptoms worsen [address] [facility]  [phone]        Current Discharge Medication List      CONTINUE these medications which have NOT CHANGED    Details   atorvastatin (LIPITOR) 10 mg tablet take 1 tablet by mouth once daily  Qty: 90 Tab, Refills: 1    Associated Diagnoses: High cholesterol      pioglitazone (ACTOS) 30 mg tablet Take 1 Tab by mouth daily. Please provide generic only  Qty: 30 Tab, Refills: 11    Associated Diagnoses: Type 2 diabetes mellitus without complication, without long-term current use of insulin (HCC)      metoprolol tartrate (LOPRESSOR) 25 mg tablet take 1/2 tablet by mouth every 12 hours  Qty: 60 Tab, Refills: 3    Associated Diagnoses: Essential hypertension with goal blood pressure less than 130/85      glipiZIDE SR (GLUCOTROL XL) 2.5 mg CR tablet take 1 tablet by mouth twice a day  Qty: 60 Tab, Refills: 5      tolterodine ER (DETROL-LA) 2 mg ER capsule Take 1 Cap by mouth nightly. Qty: 30 Cap, Refills: 2      sertraline (ZOLOFT) 50 mg tablet take 1 tablet by mouth once daily BEFORE BREAKFAST  Qty: 90 Tab, Refills: 3      aspirin delayed-release 81 mg tablet Take 81 mg by mouth nightly. multivitamin with iron tablet Take 1 Tab by mouth daily. cyanocobalamin 1,000 mcg tablet Take 1 Tab by mouth daily. For B12  Qty: 180 Tab, Refills: 1    Associated Diagnoses: DM (diabetes mellitus) ([address] Acquired solitary kidney             ED COURSE: The patient's hospital course has been uncomplicated.

## 2018-02-27 NOTE — DISCHARGE INSTRUCTIONS
We hope that we have addressed all of your medical concerns. The examination and treatment you received in the Emergency Department were for an emergent problem and were not intended as complete care. It is important that you follow up with your healthcare provider(s) for ongoing care. If your symptoms worsen or do not improve as expected, and you are unable to reach your usual health care provider(s), you should return to the Emergency Department. Today's healthcare is undergoing tremendous change, and patient satisfaction surveys are one of the many tools to assess the quality of medical care. You may receive a survey from the [employer] regarding your experience in the Emergency Department. I hope that your experience has been completely positive, particularly the medical care that I provided. As such, please participate in the survey; anything less than excellent does not meet my expectations or intentions. [facility] and 8 [facility] participate in nationally recognized quality of care measures. If your blood pressure is greater than 120/80, as reported below, we urge that you seek medical care to address the potential of high blood pressure, commonly known as hypertension. Hypertension can be hereditary or can be caused by certain medical conditions, pain, stress, or \"white coat syndrome. \"       Please make an appointment with your health care provider(s) for follow up of your Emergency Department visit.        VITALS:   Patient Vitals for the past 8 hrs:   Temp Pulse Resp BP SpO2   02/27/18 0435 - 95 19 127/61 97 %   02/27/18 0430 - 88 13 126/57 -   02/27/18 0425 - 86 15 106/64 -   02/27/18 0420 - 88 16 123/63 -   02/27/18 0417 - 92 22 119/57 -   02/27/18 0415 - 92 18 123/81 -   02/27/18 0410 - 91 20 - 100 %   02/27/18 0405 - 89 19 110/66 96 %   02/27/18 0402 - 87 19 93/55 96 %   02/27/18 0400 - - - (!) 88/36 -   02/27/18 0355 - 79 19 145/82 95 %   02/27/18 0350 - 87 17 - 94 %   02/27/18 0345 - 93 16 122/69 99 %   02/27/18 0340 - 98 20 - 99 %   02/27/18 0335 - 86 20 - 99 %   02/27/18 0331 - 92 16 - 96 %   02/27/18 0330 - 92 14 123/88 96 %   02/27/18 0325 - - - (!) 132/96 96 %   02/27/18 0142 97.9 °F (36.6 °C) 85 16 (!) 155/93 95 %   02/27/18 0138 97.9 °F (36.6 °C) 85 16 (!) 155/93 92 %          Thank you for allowing us to provide you with medical care today. We realize that you have many choices for your emergency care needs. Please choose us in the future for any continued health care needs. Brynn Joyner [address] [facility]: [phone]            No results found for this or any previous visit (from the past 24 hour(s)). Xr Humerus Rt    Result Date: 2/27/2018  CLINICAL HISTORY: Right shoulder pain FINDINGS: AP and oblique views of the right shoulder Visualized osseous structures demonstrate anteroinferior dislocation. . There is no significant soft tissue abnormality identified. IMPRESSION: Right glenohumeral anteroinferior dislocation. Ct Head Wo Cont    Result Date: 2/27/2018  EXAM:  CT HEAD WO CONT Clinical history: Fall INDICATION:   fall COMPARISON: None. CONTRAST:  None. TECHNIQUE: Unenhanced CT of the head was performed using 5 mm images. Brain and bone windows were generated. CT dose reduction was achieved through use of a standardized protocol tailored for this examination and automatic exposure control for dose modulation. FINDINGS: There is motion and streak artifact. . Sulcal and ventricular prominence. Basal ganglia calcifications. Minimal soft tissue edema overlying the right frontal bone. . There is no intracranial hemorrhage, extra-axial collection, mass, mass effect or midline shift. The basilar cisterns are open. No acute infarct is identified. The bone windows demonstrate no abnormalities.  The visualized portions of the paranasal sinuses and mastoid air cells are clear. IMPRESSION: No acute intracranial process              Preventing Falls: Care Instructions  Your Care Instructions    Getting around your home safely can be a challenge if you have injuries or health problems that make it easy for you to fall. Loose rugs and furniture in walkways are among the dangers for many older people who have problems walking or who have poor eyesight. People who have conditions such as arthritis, osteoporosis, or dementia also have to be careful not to fall. You can make your home safer with a few simple measures. Follow-up care is a key part of your treatment and safety. Be sure to make and go to all appointments, and call your doctor if you are having problems. It's also a good idea to know your test results and keep a list of the medicines you take. How can you care for yourself at home? Taking care of yourself  · You may get dizzy if you do not drink enough water. To prevent dehydration, drink plenty of fluids, enough so that your urine is light yellow or clear like water. Choose water and other caffeine-free clear liquids. If you have kidney, heart, or liver disease and have to limit fluids, talk with your doctor before you increase the amount of fluids you drink. · Exercise regularly to improve your strength, muscle tone, and balance. Walk if you can. Swimming may be a good choice if you cannot walk easily. · Have your vision and hearing checked each year or any time you notice a change. If you have trouble seeing and hearing, you might not be able to avoid objects and could lose your balance. · Know the side effects of the medicines you take. Ask your doctor or pharmacist whether the medicines you take can affect your balance. Sleeping pills or sedatives can affect your balance. · Limit the amount of alcohol you drink. Alcohol can impair your balance and other senses. · Ask your doctor whether calluses or corns on your feet need to be removed. If you wear loose-fitting shoes because of calluses or corns, you can lose your balance and fall. · Talk to your doctor if you have numbness in your feet. Preventing falls at home  · Remove raised doorway thresholds, throw rugs, and clutter. Repair loose carpet or raised areas in the floor. · Move furniture and electrical cords to keep them out of walking paths. · Use nonskid floor wax, and wipe up spills right away, especially on ceramic tile floors. · If you use a walker or cane, put rubber tips on it. If you use crutches, clean the bottoms of them regularly with an abrasive pad, such as steel wool. · Keep your house well lit, especially [place], and outside walkways. Use night-lights in areas such as hallways and bathrooms. Add extra light switches or use remote switches (such as switches that go on or off when you clap your hands) to make it easier to turn lights on if you have to get up during the night. · Install sturdy handrails on stairways. · Move items in your cabinets so that the things you use a lot are on the lower shelves (about waist level). · Keep a cordless phone and a flashlight with new batteries by your bed. If possible, put a phone in each of the main rooms of your house, or carry a cell phone in case you fall and cannot reach a phone. Or, you can wear a device around your neck or wrist. You push a button that sends a signal for help. · Wear low-heeled shoes that fit well and give your feet good support. Use footwear with nonskid soles. Check the heels and soles of your shoes for wear. Repair or replace worn heels or soles. · Do not wear socks without shoes on wood floors. · Walk on the grass when the sidewalks are slippery. If you live in an area that gets snow and ice in the winter, sprinkle salt on slippery steps and sidewalks. Preventing falls in the bath  · Install grab bars and nonskid mats inside and outside your shower or tub and near the toilet and sinks.   · Use shower chairs and bath benches. · Use a hand-held shower head that will allow you to sit while showering. · Get into a tub or shower by putting the weaker leg in first. Get out of a tub or shower with your strong side first.  · Repair loose toilet seats and consider installing a raised toilet seat to make getting on and off the toilet easier. · Keep your bathroom door unlocked while you are in the shower. Where can you learn more? Go to http://antoni-jacqui.info/. Enter 0476 [id number] 69 [id number] in the search box to learn more about \"Preventing Falls: Care Instructions. \"  Current as of: May 12, 2017  Content Version: 11.4  © 4639-2383 [employer]. Care instructions adapted under license by [employer] (which disclaims liability or warranty for this information). If you have questions about a medical condition or this instruction, always ask your healthcare professional. [place] [address] any warranty or liability for your use of this information. Head Injury: Care Instructions  Your Care Instructions    Most injuries to the head are minor. Bumps, cuts, and scrapes on the head and face usually heal well and can be treated the same as injuries to other parts of the body. Although it's rare, once in a while a more serious problem shows up after you are home. So it's good to be on the lookout for symptoms for a day or two. Follow-up care is a key part of your treatment and safety. Be sure to make and go to all appointments, and call your doctor if you are having problems. It's also a good idea to know your test results and keep a list of the medicines you take. How can you care for yourself at home? · Follow your doctor's instructions. He or she will tell you if you need someone to watch you closely for the next 24 hours or longer. · Take it easy for the next few days or more if you are not feeling well.   · Ask your doctor when it's okay for you to go back to activities like driving a car, riding a bike, or operating machinery. When should you call for help? Call 911 anytime you think you may need emergency care. For example, call if:  ? · You have a seizure. ? · You passed out (lost consciousness). ? · You are confused or can't stay awake. ?Call your doctor now or seek immediate medical care if:  ? · You have new or worse vomiting. ? · You feel less alert. ? · You have new weakness or numbness in any part of your body. ? Watch closely for changes in your health, and be sure to contact your doctor if:  ? · You do not get better as expected. ? · You have new symptoms, such as headaches, trouble concentrating, or changes in mood. Where can you learn more? Go to http://antoni-jacqui.info/. Enter D587 in the search box to learn more about \"Head Injury: Care Instructions. \"  Current as of: October 14, 2016  Content Version: 11.4  © 8662-8896 [employer]. Care instructions adapted under license by [facility] (which disclaims liability or warranty for this information). If you have questions about a medical condition or this instruction, always ask your healthcare professional. [place] [address] any warranty or liability for your use of this information. Dislocated Shoulder: Care Instructions  Your Care Instructions    When the upper arm comes out of the shoulder socket, it is called a dislocated shoulder. After the doctor puts the shoulder back in place, he or she may put your arm in a sling or shoulder immobilizer. This will keep it from moving. Exercise and physical therapy can help your shoulder get strong and move normally again. It may take up to a year for your shoulder to heal and be free of pain. If your shoulder keeps coming out of place, talk to your doctor about surgery. It can prevent dislocations. You may have had a sedative to help you relax.  You may be unsteady after having sedation. It can take a few hours for the medicine's effects to wear off. Common side effects of sedation include nausea, vomiting, and feeling sleepy or tired. The doctor has checked you carefully, but problems can develop later. If you notice any problems or new symptoms, get medical treatment right away. Follow-up care is a key part of your treatment and safety. Be sure to make and go to all appointments, and call your doctor if you are having problems. It's also a good idea to know your test results and keep a list of the medicines you take. How can you care for yourself at home? · If the doctor gave you a sedative:  ¨ For 24 hours, don't do anything that requires attention to detail. It takes time for the medicine's effects to completely wear off. ¨ For your safety, do not drive or operate any machinery that could be dangerous. Wait until the medicine wears off and you can think clearly and react easily. · If your doctor put your arm in a sling or shoulder immobilizer, wear it as directed. · Take pain medicines exactly as directed. ¨ If the doctor gave you a prescription medicine for pain, take it as prescribed. ¨ If you are not taking a prescription pain medicine, ask your doctor if you can take an over-the-counter medicine. · Put ice or a cold pack on your shoulder for 10 to 20 minutes at a time. Try to do this every 1 to 2 hours for the next 3 days (when you are awake). Put a thin cloth between the ice and your skin. · You may use warm packs after the first 3 days for 15 to 20 minutes at a time. This can ease pain. · If your doctor gave you exercises to do at home, do them exactly as your doctor told you. · Do not do anything that makes the pain worse. When should you call for help? Call 911 anytime you think you may need emergency care. For example, call if:  · You have trouble breathing. · You passed out (lost consciousness).   Call your doctor now or seek immediate medical care if:  · You have new or worse nausea or vomiting. · You have new or worse pain. · Your hand or fingers are cool or pale or change color. · You have tingling, weakness, or numbness in your hand or fingers. Watch closely for changes in your health, and be sure to contact your doctor if:  · You do not get better as expected. Where can you learn more? Go to http://antoni-jacqui.info/. Enter W592 in the search box to learn more about \"Dislocated Shoulder: Care Instructions. \"  Current as of: March 21, 2017  Content Version: 11.4  © 5794-5988 [employer]. Care instructions adapted under license by [employer] (which disclaims liability or warranty for this information). If you have questions about a medical condition or this instruction, always ask your healthcare professional. [address] any warranty or liability for your use of this information.

## 2018-02-27 NOTE — PROGRESS NOTES
2/27/18: GI Call attempted:  Pt to ED following a fall;  Contusion to forehead and dislocated R shoulder w/ successful reduction done in ED    Number called is for [facility], named on PHI from and caregiver. No number for calling pt directly. LMOM    3/1/18:  Spoke to caregiver, Brady Farris  Pt lives with spouse (who is home with her) and caregiver checks on her multiple times during the day. Pt with developmental delay. Goals Addressed             Most Recent       Post ED     Coordinate Pain Management Plan for Patient. 3/1/18:  Per caregiver, pt saw ortho, Dr Tina Contreras,  yesterday - given a sling and to wear it  most of her awake hours except for one gentle exercise recommended by Dr OQUENDO ;  Given rx for Oxycodone 5mg #40 for pain, just took the first one about an hr ago. Pain had been unrelieved by Tylenol. Caregiver does not have Oxy in pt pill boxes, she will give pt as needed and states, \"I doubt she will take more than a few. ..had it in the past and hardly used it\". Will see Dr Tina Contreras again on 3/14/18 for recheck and to determine if ready for PT.       Knowledge and adherence to medication plan. 3/1/18:  Med rec completed with caregiver wo prepares pill boxes for pt. No barriers to obtaining meds and added Oxycodone to med list.  Patient is compliant with meds. /kenny         Post Hospitalization     COMPLETED: Attends follow-up appointments as directed. On track (1/11/2017)             Pt had follow up with Dr. Clemente Wall but rescheduled PCP follow up appointment from 12/26/16 to 1/23/17 per suggestion of Dr. Tram Wynn office since pt will have stent replaced again on 1/10/17. Jeffery Mota, RN         Attends follow-up appointments as directed. Post ED appts: ED 2/27 for head contusion and dislocated Rt shoulder after she tripped and fell. Per caregiver:   Pt saw Dr Tina Andres Junior 2/28/18 and for a 2wk f/u on 3/14/18.    Wearing a sling most of the day, \"doctor said she may have torn a ligament so important to rest it for now except for one gentle exercise he instructed her to do. PT in a couple of weeks. PCP appt 3/26/18 in [facility] and would like to keep this. Difficult for pt to get to other clinics due to lives in [facility]./kenny          NN role explained to caregiver and contact information given to her Ashley Carroll). Denies questions or concerns at this time.

## 2018-03-01 RX ORDER — OXYCODONE HYDROCHLORIDE 5 MG/1
5 TABLET ORAL
COMMUNITY
End: 2018-03-26 | Stop reason: ALTCHOICE

## 2018-03-14 ENCOUNTER — HOSPITAL ENCOUNTER (OUTPATIENT)
Dept: PREADMISSION TESTING | Age: 63
Discharge: HOME | End: 2018-03-14
Payer: SELF-PAY

## 2018-03-14 VITALS
HEIGHT: 55 IN | SYSTOLIC BLOOD PRESSURE: 141 MMHG | HEART RATE: 74 BPM | RESPIRATION RATE: 22 BRPM | WEIGHT: 147.1 LBS | TEMPERATURE: 98.5 F | DIASTOLIC BLOOD PRESSURE: 71 MMHG | BODY MASS INDEX: 34.04 KG/M2 | OXYGEN SATURATION: 100 %

## 2018-03-14 LAB
ALBUMIN SERPL-MCNC: 2.9 G/DL (ref 3.5–5)
ALBUMIN/GLOB SERPL: 0.6 {RATIO} (ref 1.1–2.2)
ALP SERPL-CCNC: 141 U/L (ref 45–117)
ALT SERPL-CCNC: 18 U/L (ref 12–78)
ANION GAP SERPL CALC-SCNC: 12 MMOL/L (ref 5–15)
APPEARANCE UR: ABNORMAL
AST SERPL-CCNC: 21 U/L (ref 15–37)
ATRIAL RATE: 73 BPM
BACTERIA URNS QL MICRO: ABNORMAL /HPF
BASOPHILS # BLD: 0.1 K/UL (ref 0–0.1)
BASOPHILS NFR BLD: 1 % (ref 0–1)
BILIRUB SERPL-MCNC: 0.2 MG/DL (ref 0.2–1)
BILIRUB UR QL: NEGATIVE
BUN SERPL-MCNC: 37 MG/DL (ref 6–20)
BUN/CREAT SERPL: 26 (ref 12–20)
CALCIUM SERPL-MCNC: 9 MG/DL (ref 8.5–10.1)
CALCULATED P AXIS, ECG09: 46 DEGREES
CALCULATED R AXIS, ECG10: 5 DEGREES
CALCULATED T AXIS, ECG11: 29 DEGREES
CHLORIDE SERPL-SCNC: 108 MMOL/L (ref 97–108)
CO2 SERPL-SCNC: 22 MMOL/L (ref 21–32)
COLOR UR: ABNORMAL
CREAT SERPL-MCNC: 1.41 MG/DL (ref 0.55–1.02)
DIAGNOSIS, 93000: NORMAL
DIFFERENTIAL METHOD BLD: ABNORMAL
EOSINOPHIL # BLD: 0.1 K/UL (ref 0–0.4)
EOSINOPHIL NFR BLD: 2 % (ref 0–7)
EPITH CASTS URNS QL MICRO: ABNORMAL /LPF
ERYTHROCYTE [DISTWIDTH] IN BLOOD BY AUTOMATED COUNT: 15.3 % (ref 11.5–14.5)
GLOBULIN SER CALC-MCNC: 4.6 G/DL (ref 2–4)
GLUCOSE SERPL-MCNC: 110 MG/DL (ref 65–100)
GLUCOSE UR STRIP.AUTO-MCNC: NEGATIVE MG/DL
HCT VFR BLD AUTO: 31.8 % (ref 35–47)
HGB BLD-MCNC: 9.3 G/DL (ref 11.5–16)
HGB UR QL STRIP: ABNORMAL
IMM GRANULOCYTES # BLD: 0 K/UL (ref 0–0.04)
IMM GRANULOCYTES NFR BLD AUTO: 0 % (ref 0–0.5)
KETONES UR QL STRIP.AUTO: NEGATIVE MG/DL
LEUKOCYTE ESTERASE UR QL STRIP.AUTO: ABNORMAL
LYMPHOCYTES # BLD: 1.3 K/UL (ref 0.8–3.5)
LYMPHOCYTES NFR BLD: 21 % (ref 12–49)
MCH RBC QN AUTO: 26.6 PG (ref 26–34)
MCHC RBC AUTO-ENTMCNC: 29.2 G/DL (ref 30–36.5)
MCV RBC AUTO: 90.9 FL (ref 80–99)
MONOCYTES # BLD: 0.6 K/UL (ref 0–1)
MONOCYTES NFR BLD: 9 % (ref 5–13)
NEUTS SEG # BLD: 4 K/UL (ref 1.8–8)
NEUTS SEG NFR BLD: 66 % (ref 32–75)
NITRITE UR QL STRIP.AUTO: POSITIVE
NRBC # BLD: 0 K/UL (ref 0–0.01)
NRBC BLD-RTO: 0 PER 100 WBC
P-R INTERVAL, ECG05: 148 MS
PH UR STRIP: 6 [PH] (ref 5–8)
PLATELET # BLD AUTO: 198 K/UL (ref 150–400)
PMV BLD AUTO: 10 FL (ref 8.9–12.9)
POTASSIUM SERPL-SCNC: 5 MMOL/L (ref 3.5–5.1)
PROT SERPL-MCNC: 7.5 G/DL (ref 6.4–8.2)
PROT UR STRIP-MCNC: 100 MG/DL
Q-T INTERVAL, ECG07: 348 MS
QRS DURATION, ECG06: 80 MS
QTC CALCULATION (BEZET), ECG08: 383 MS
RBC # BLD AUTO: 3.5 M/UL (ref 3.8–5.2)
RBC #/AREA URNS HPF: ABNORMAL /HPF (ref 0–5)
SODIUM SERPL-SCNC: 142 MMOL/L (ref 136–145)
SP GR UR REFRACTOMETRY: 1.01 (ref 1–1.03)
UA: UC IF INDICATED,UAUC: ABNORMAL
UROBILINOGEN UR QL STRIP.AUTO: 0.2 EU/DL (ref 0.2–1)
VENTRICULAR RATE, ECG03: 73 BPM
WBC # BLD AUTO: 6 K/UL (ref 3.6–11)
WBC URNS QL MICRO: >100 /HPF (ref 0–4)

## 2018-03-14 PROCEDURE — 87077 CULTURE AEROBIC IDENTIFY: CPT | Performed by: UROLOGY

## 2018-03-14 PROCEDURE — 81001 URINALYSIS AUTO W/SCOPE: CPT | Performed by: UROLOGY

## 2018-03-14 PROCEDURE — 93005 ELECTROCARDIOGRAM TRACING: CPT

## 2018-03-14 PROCEDURE — 36415 COLL VENOUS BLD VENIPUNCTURE: CPT | Performed by: UROLOGY

## 2018-03-14 PROCEDURE — 85025 COMPLETE CBC W/AUTO DIFF WBC: CPT | Performed by: UROLOGY

## 2018-03-14 PROCEDURE — 87186 SC STD MICRODIL/AGAR DIL: CPT | Performed by: UROLOGY

## 2018-03-14 PROCEDURE — 87086 URINE CULTURE/COLONY COUNT: CPT | Performed by: UROLOGY

## 2018-03-14 PROCEDURE — 80053 COMPREHEN METABOLIC PANEL: CPT | Performed by: UROLOGY

## 2018-03-14 NOTE — PERIOP NOTES
[facility] PREOPERATIVE INSTRUCTIONS    Surgery Date:   3/20/18      Surgery arrival time given by surgeon: NO  (If [facility] staff will call you between 3pm - 7pm the day before surgery with your arrival time. If your surgery is on a Monday, we will call you the preceding Friday. Please call [phone] after 7pm if you did not receive your arrival time.)  1. Report to the 2nd Floor Admitting Desk on the day of your surgery. Bring your insurance card, photo identification, and any copayment (if applicable). 2. You must have a responsible adult to drive you home and stay with you the first 24 hours after surgery if you are going home the same day of your surgery. 3. Nothing to eat or drink after midnight the night before surgery. This means NO water, gum, mints, coffee, juice, etc.    4. MEDICATIONS TO TAKE THE MORNING OF SURGERY WITH A SIP OF WATER:  metoprolol  5. No alcoholic beverages 24 hours before and after your surgery. 6. If you are being admitted to the hospital, please leave personal belongings/luggage in your car until you have an assigned hospital room number. (The hospital discharge time is NOON. Your adult  should be at the hospital prior to the noon discharge time unless otherwise instructed.)   7. STOP Aspirin and/or any non-steroidal anti-inflammatory drugs (i.e. Ibuprofen, Naproxen, Advil, Aleve) as directed by your surgeon. Stop herbal supplements 1 week prior to surgery. 8. If you are currently taking Plavix, Coumadin, or any other blood-thinning/ anticoagulant You may take Tylenol. medication contact your surgeon for instructions. 9. Wear comfortable clothes. Wear your glasses instead of contacts. Please leave all money, jewelry and valuables at home. No make-up, particularly mascara, the day of surgery. 10. REMOVE ALL body piercings, rings, and jewelry and leave at home. Wear your hair loose or down, no pony-tails, buns, or any metal hair clips.    11. If you shower the morning of surgery, please do not apply any lotions, powders, or deodorants afterwards. Do not shave any body area within 24 hours of your surgery. 12. Please follow all instructions to avoid any potential surgical cancellation. 13. Should your physical condition change, (i.e. fever, cold, flu, etc.) please notify your surgeon as soon as possible. 14. It is important to be on time. If a situation occurs where you may be delayed, please call:  [phone] on the day of surgery. 15. The [employer] staff can be reached at [phone]. 16. Special instructions:   Free  parking 7a-5p. The patient and caregiver were contacted in person. They verbalize  understanding of all instructions do not  need reinforcement.

## 2018-03-14 NOTE — H&P
SARAH Pre-Op History & Physical    Patient: Hair Current                  MRN: [id number]          SSN: [id number]  YOB: 1955          Age: 59 y.o. Sex: female                Subjective:   Patient is a 59 y.o.  female who presents with history of left hydronephrosis that has been attributed to ureteral narrowing secondary to radiation therapy for cervical cancer. Has solitary left kidney due to right nephrectomy in 1998. Requires periodic cystoscopies and stent exchanges- last was done 9/13/2017. The patient was evaluated in the surgeon's office and it was determined that the most appropriate plan of care is to proceed with surgical intervention. Patient's caregiver- Walter Gonzalez- is present and helps answer questions. Patient's  CARRIE Anne    Patient was seen in ED 02/27/2018 for a dislocated right shoulder after she fell at home. Seeing Dr. Janee Douglas for orthopedic follow up. Past Medical History:   Diagnosis Date    Anemia     Chronic kidney disease     stage 3    Coronary artery disease 9/17/2014    A. Echo (9/16/14):  EF 45% with mid-distal septal/anterior HK, DD.   PASP 37.    Coronary artery disease     MI 9/2014    Depression     Diabetes ([address]     DM type 2 causing renal disease ([address]     History of nephrectomy     Right kidney    Hx MRSA infection     had negative swab [id number] but + swab on 5/16/2017, negative swab 09/06/2017    Hyperlipidemia     Hypertension     Mental retardation     Nephrolithiasis     hx    Uterine cancer ([address]     hx    UTI (lower urinary tract infection)     Vitamin D deficiency       Past Surgical History:   Procedure Laterality Date    HX CHOLECYSTECTOMY      HX DILATION AND CURETTAGE      HX HYSTERECTOMY      HX ORTHOPAEDIC      ORIF left arm, hx of fracture    HX ORTHOPAEDIC      Right ankle fx repair    HX OTHER SURGICAL  9/2014    I&D right leg wound    HX UROLOGICAL  1998    right nephrectomy    HX UROLOGICAL Left     multiple renal stents      Prior to Admission medications    Medication Sig Start Date End Date Taking? Authorizing Provider   oxyCODONE IR (ROXICODONE) 5 mg immediate release tablet Take 5 mg by mouth every four (4) hours as needed for Pain (#40 RX from Dr Loida Kearns). Historical Provider   atorvastatin (LIPITOR) 10 mg tablet take 1 tablet by mouth once daily 1/23/18   CARRIE Zeng   pioglitazone (ACTOS) 30 mg tablet Take 1 Tab by mouth daily. Please provide generic only 12/8/17   Merlin Castellon MD   metoprolol tartrate (LOPRESSOR) 25 mg tablet take 1/2 tablet by mouth every 12 hours 8/16/17   [facility] CARRIE Becker   glipiZIDE SR (GLUCOTROL XL) 2.5 mg CR tablet take 1 tablet by mouth twice a day  Patient taking differently: take 1 tablet by mouth 8/1/17   CARRIE Pena   tolterodine ER (DETROL-LA) 2 mg ER capsule Take 1 Cap by mouth nightly. 6/30/17   CARRIE Zeng   sertraline (ZOLOFT) 50 mg tablet take 1 tablet by mouth once daily BEFORE BREAKFAST 5/10/17   CARRIE Zeng   aspirin delayed-release 81 mg tablet Take 81 mg by mouth nightly. Historical Provider   multivitamin with iron tablet Take 1 Tab by mouth daily. Historical Provider   cyanocobalamin 1,000 mcg tablet Take 1 Tab by mouth daily. For B12 2/24/14   Antimony Score, NP     Current Outpatient Prescriptions   Medication Sig    oxyCODONE IR (ROXICODONE) 5 mg immediate release tablet Take 5 mg by mouth every four (4) hours as needed for Pain (#40 RX from Dr Loida Kearns).  atorvastatin (LIPITOR) 10 mg tablet take 1 tablet by mouth once daily    pioglitazone (ACTOS) 30 mg tablet Take 1 Tab by mouth daily.  Please provide generic only    metoprolol tartrate (LOPRESSOR) 25 mg tablet take 1/2 tablet by mouth every 12 hours    glipiZIDE SR (GLUCOTROL XL) 2.5 mg CR tablet take 1 tablet by mouth twice a day (Patient taking differently: take 1 tablet by mouth)    tolterodine ER (DETROL-LA) 2 mg ER capsule Take 1 Cap by mouth nightly.  sertraline (ZOLOFT) 50 mg tablet take 1 tablet by mouth once daily BEFORE BREAKFAST    aspirin delayed-release 81 mg tablet Take 81 mg by mouth nightly.  multivitamin with iron tablet Take 1 Tab by mouth daily.  cyanocobalamin 1,000 mcg tablet Take 1 Tab by mouth daily. For B12     No current facility-administered medications for this encounter. Allergies   Allergen Reactions    Hydrocodone Nausea Only    Ibuprofen Unknown (comments)     Pt cannot take because of having only one kidney    Penicillins Rash     Tolerates Cephalexin       Social History   Substance Use Topics    Smoking status: Never Smoker    Smokeless tobacco: Never Used    Alcohol use No      History   Drug Use No     Family History   Problem Relation Age of Onset    Mental Retardation Mother     Heart Disease Mother     Breast Cancer Sister     No Known Problems Brother     Malignant Hyperthermia Neg Hx     Pseudocholinesterase Deficiency Neg Hx     Delayed Awakening Neg Hx     Post-op Nausea/Vomiting Neg Hx     Emergence Delirium Neg Hx     Post-op Cognitive Dysfunction Neg Hx          Review of Systems    Patient denies difficulty swallowing, mouth sores, or loose teeth. Patient denies any recent dental procedures or any planned prior to surgery. Patient denies chest pain, tightness, pain radiating down left arm, palpitations. Denies dizziness, visual disturbances, or lightheadedness. Patient denies shortness of breath, wheezing, cough, fever, or chills. Patient denies diarrhea, constipation, or abdominal pain. Patient denies urinary problems including dysuria, hesitancy, urgency, or incontinence. Denies skin breakdown, rashes, insect bites or open area.          Objective:   Patient Vitals for the past 24 hrs:   Temp Pulse Resp BP SpO2   18 1048 98.5 °F (36.9 °C) 74 22 141/71 100 %     Temp (24hrs), Av.5 °F (36.9 °C), Min:98.5 °F (36.9 °C), Max:98.5 °F (36.9 °C)    Body mass index is 35.47 kg/(m^2). Wt Readings from Last 1 Encounters:   03/14/18 66.7 kg (147 lb 1.6 oz)        Physical Exam:     General: Pleasant,  cooperative, no apparent distress, appears stated age. Eyes: Conjunctivae/corneas clear. EOMs intact. Nose: Nares normal.   Mouth/Throat: Lips, mucosa, and tongue normal. Top dentures in place- no lower teeth. Neck: Supple, symmetrical, trachea midline. Back: Symmetric   Lungs: Clear to auscultation bilaterally. Heart: Regular rate and rhythm, S1, S2 normal. No murmur, click, rub or gallop. Abdomen: Soft, non-tender. Bowel sounds normal. No distention. Musculoskeletal:  ROM right shoulder limited by discomfort. Extremities:  Extremities normal, atraumatic, no cyanosis or edema. Calves                                 supple, non tender to palpation. Pulses: 2+ and symmetric bilateral upper extremities. Cap. refill <2 seconds   Skin: Skin color, texture, turgor normal.  No rashes or lesions. Neurologic: CN II-XII grossly intact. Alert and oriented x3. Labs:   Recent Results (from the past 72 hour(s))   CBC WITH AUTOMATED DIFF    Collection Time: 03/14/18 11:53 AM   Result Value Ref Range    WBC 6.0 3.6 - 11.0 K/uL    RBC 3.50 (L) 3.80 - 5.20 M/uL    HGB 9.3 (L) 11.5 - 16.0 g/dL    HCT 31.8 (L) 35.0 - 47.0 %    MCV 90.9 80.0 - 99.0 FL    MCH 26.6 26.0 - 34.0 PG    MCHC 29.2 (L) 30.0 - 36.5 g/dL    RDW 15.3 (H) 11.5 - 14.5 %    PLATELET 410 496 - 043 K/uL    MPV 10.0 8.9 - 12.9 FL    NRBC 0.0 0  WBC    ABSOLUTE NRBC 0.00 0.00 - 0.01 K/uL    NEUTROPHILS 66 32 - 75 %    LYMPHOCYTES 21 12 - 49 %    MONOCYTES 9 5 - 13 %    EOSINOPHILS 2 0 - 7 %    BASOPHILS 1 0 - 1 %    IMMATURE GRANULOCYTES 0 0.0 - 0.5 %    ABS. NEUTROPHILS 4.0 1.8 - 8.0 K/UL    ABS. LYMPHOCYTES 1.3 0.8 - 3.5 K/UL    ABS. MONOCYTES 0.6 0.0 - 1.0 K/UL    ABS. EOSINOPHILS 0.1 0.0 - 0.4 K/UL    ABS. BASOPHILS 0.1 0.0 - 0.1 K/UL    ABS. IMM.  GRANS. 0.0 0.00 - 0.04 K/UL    DF AUTOMATED     METABOLIC PANEL, COMPREHENSIVE    Collection Time: 03/14/18 11:53 AM   Result Value Ref Range    Sodium 142 136 - 145 mmol/L    Potassium 5.0 3.5 - 5.1 mmol/L    Chloride 108 97 - 108 mmol/L    CO2 22 21 - 32 mmol/L    Anion gap 12 5 - 15 mmol/L    Glucose 110 (H) 65 - 100 mg/dL    BUN 37 (H) 6 - 20 MG/DL    Creatinine 1.41 (H) 0.55 - 1.02 MG/DL    BUN/Creatinine ratio 26 (H) 12 - 20      GFR est AA 46 (L) >60 ml/min/1.73m2    GFR est non-AA 38 (L) >60 ml/min/1.73m2    Calcium 9.0 8.5 - 10.1 MG/DL    Bilirubin, total 0.2 0.2 - 1.0 MG/DL    ALT (SGPT) 18 12 - 78 U/L    AST (SGOT) 21 15 - 37 U/L    Alk.  phosphatase 141 (H) 45 - 117 U/L    Protein, total 7.5 6.4 - 8.2 g/dL    Albumin 2.9 (L) 3.5 - 5.0 g/dL    Globulin 4.6 (H) 2.0 - 4.0 g/dL    A-G Ratio 0.6 (L) 1.1 - 2.2     URINALYSIS W/ REFLEX CULTURE    Collection Time: 03/14/18 11:53 AM   Result Value Ref Range    Color YELLOW/STRAW      Appearance TURBID (A) CLEAR      Specific gravity 1.010 1.003 - 1.030      pH (UA) 6.0 5.0 - 8.0      Protein 100 (A) NEG mg/dL    Glucose NEGATIVE  NEG mg/dL    Ketone NEGATIVE  NEG mg/dL    Bilirubin NEGATIVE  NEG      Blood LARGE (A) NEG      Urobilinogen 0.2 0.2 - 1.0 EU/dL    Nitrites POSITIVE (A) NEG      Leukocyte Esterase LARGE (A) NEG      WBC >100 (H) 0 - 4 /hpf    RBC  0 - 5 /hpf    Epithelial cells FEW FEW /lpf    Bacteria 1+ (A) NEG /hpf    UA:UC IF INDICATED URINE CULTURE ORDERED (A) CNI     EKG, 12 LEAD, INITIAL    Collection Time: 03/14/18 12:24 PM   Result Value Ref Range    Ventricular Rate 73 BPM    Atrial Rate 73 BPM    P-R Interval 148 ms    QRS Duration 80 ms    Q-T Interval 348 ms    QTC Calculation (Bezet) 383 ms    Calculated P Axis 46 degrees    Calculated R Axis 5 degrees    Calculated T Axis 29 degrees    Diagnosis       Normal sinus rhythm  Minimal voltage criteria for LVH, may be normal variant  Borderline ECG  When compared with ECG of 16-MAY-2017 15:58,  No significant change was found         Assessment:     Hydronephrosis. Plan:     Scheduled for cystoscopy left retrograde left ureteral stent exchange. Labs and EKG done per surgeon's orders. Labs reviewed- multiple abnormal values but seems to be patient's baseline. UA triggered C&S pending. Final EKG reading  pending. MRSA nasal swab sent per protocol due to history of prior + test and only 1 recorded negative since that time. MRSA result pending.       Charlie Lenz NP

## 2018-03-15 LAB
BACTERIA SPEC CULT: NORMAL
BACTERIA SPEC CULT: NORMAL
SERVICE CMNT-IMP: NORMAL

## 2018-03-16 NOTE — PERIOP NOTES
Faxed abnormal UA and Urine culture to Dr. Cassidy Mendoza office and also left VM for Selene Cervantes at that office regarding these results. Urine culture is still preliminary but does have some sensitivities noted at this time--culture states + for E Coli but checking further for Enterococcus. These were sent since it is Friday and there are no NP's in the PAT office today to review and/or treat this UTI.

## 2018-03-19 ENCOUNTER — ANESTHESIA EVENT (OUTPATIENT)
Dept: SURGERY | Age: 63
End: 2018-03-19
Payer: SELF-PAY

## 2018-03-19 NOTE — PERIOP NOTES
Patient with history of MRSA. Only 1 negative culture, which was obtained during the patient's PAT appointment, since a positive culture on 5/16/2017. Order for Contact Isolation placed in [facility] under signed and held, multiphase, for the DOS, 3/20/2018.

## 2018-03-20 ENCOUNTER — HOSPITAL ENCOUNTER (OUTPATIENT)
Age: 63
Setting detail: OUTPATIENT SURGERY
Discharge: HOME | End: 2018-03-20
Attending: UROLOGY | Admitting: UROLOGY
Payer: SELF-PAY

## 2018-03-20 ENCOUNTER — APPOINTMENT (OUTPATIENT)
Dept: GENERAL RADIOLOGY | Age: 63
End: 2018-03-20
Attending: UROLOGY
Payer: SELF-PAY

## 2018-03-20 ENCOUNTER — ANESTHESIA (OUTPATIENT)
Dept: SURGERY | Age: 63
End: 2018-03-20
Payer: SELF-PAY

## 2018-03-20 VITALS
HEART RATE: 112 BPM | TEMPERATURE: 98.2 F | DIASTOLIC BLOOD PRESSURE: 56 MMHG | RESPIRATION RATE: 15 BRPM | SYSTOLIC BLOOD PRESSURE: 116 MMHG | OXYGEN SATURATION: 99 %

## 2018-03-20 LAB
GLUCOSE BLD STRIP.AUTO-MCNC: 127 MG/DL (ref 65–100)
GLUCOSE BLD STRIP.AUTO-MCNC: 143 MG/DL (ref 65–100)
SERVICE CMNT-IMP: ABNORMAL
SERVICE CMNT-IMP: ABNORMAL

## 2018-03-20 PROCEDURE — C1758 CATHETER, URETERAL: HCPCS | Performed by: UROLOGY

## 2018-03-20 PROCEDURE — 87186 SC STD MICRODIL/AGAR DIL: CPT | Performed by: UROLOGY

## 2018-03-20 PROCEDURE — 74011636320 HC RX REV CODE- 636/320: Performed by: UROLOGY

## 2018-03-20 PROCEDURE — 77030019927 HC TBNG IRR CYSTO BAXT -A: Performed by: UROLOGY

## 2018-03-20 PROCEDURE — 74420 UROGRAPHY RTRGR +-KUB: CPT

## 2018-03-20 PROCEDURE — 74011250636 HC RX REV CODE- 250/636: Performed by: ANESTHESIOLOGY

## 2018-03-20 PROCEDURE — 76210000000 HC OR PH I REC 2 TO 2.5 HR: Performed by: UROLOGY

## 2018-03-20 PROCEDURE — 74011250636 HC RX REV CODE- 250/636

## 2018-03-20 PROCEDURE — C2617 STENT, NON-COR, TEM W/O DEL: HCPCS | Performed by: UROLOGY

## 2018-03-20 PROCEDURE — 77030018832 HC SOL IRR H20 ICUM -A: Performed by: UROLOGY

## 2018-03-20 PROCEDURE — 76210000020 HC REC RM PH II FIRST 0.5 HR: Performed by: UROLOGY

## 2018-03-20 PROCEDURE — C1769 GUIDE WIRE: HCPCS | Performed by: UROLOGY

## 2018-03-20 PROCEDURE — 76010000160 HC OR TIME 0.5 TO 1 HR INTENSV-TIER 1: Performed by: UROLOGY

## 2018-03-20 PROCEDURE — 87086 URINE CULTURE/COLONY COUNT: CPT | Performed by: UROLOGY

## 2018-03-20 PROCEDURE — 77030018836 HC SOL IRR NACL ICUM -A: Performed by: UROLOGY

## 2018-03-20 PROCEDURE — 77030032490 HC SLV COMPR SCD KNE COVD -B: Performed by: UROLOGY

## 2018-03-20 PROCEDURE — 77030020143 HC AIRWY LARYN INTUB CGAS -A: Performed by: ANESTHESIOLOGY

## 2018-03-20 PROCEDURE — 82962 GLUCOSE BLOOD TEST: CPT

## 2018-03-20 PROCEDURE — 76060000032 HC ANESTHESIA 0.5 TO 1 HR: Performed by: UROLOGY

## 2018-03-20 PROCEDURE — 74011250636 HC RX REV CODE- 250/636: Performed by: UROLOGY

## 2018-03-20 PROCEDURE — 74011000250 HC RX REV CODE- 250

## 2018-03-20 PROCEDURE — 87077 CULTURE AEROBIC IDENTIFY: CPT | Performed by: UROLOGY

## 2018-03-20 PROCEDURE — 77030020782 HC GWN BAIR PAWS FLX 3M -B

## 2018-03-20 DEVICE — URETERAL STENT
Type: IMPLANTABLE DEVICE | Site: URETER | Status: FUNCTIONAL
Brand: CONTOUR™

## 2018-03-20 RX ORDER — LIDOCAINE HYDROCHLORIDE 10 MG/ML
0.1 INJECTION, SOLUTION EPIDURAL; INFILTRATION; INTRACAUDAL; PERINEURAL AS NEEDED
Status: DISCONTINUED | OUTPATIENT
Start: 2018-03-20 | End: 2018-03-20 | Stop reason: HOSPADM

## 2018-03-20 RX ORDER — SODIUM CHLORIDE 0.9 % (FLUSH) 0.9 %
5-10 SYRINGE (ML) INJECTION AS NEEDED
Status: DISCONTINUED | OUTPATIENT
Start: 2018-03-20 | End: 2018-03-20 | Stop reason: HOSPADM

## 2018-03-20 RX ORDER — HYDROMORPHONE HYDROCHLORIDE 1 MG/ML
.25-1 INJECTION, SOLUTION INTRAMUSCULAR; INTRAVENOUS; SUBCUTANEOUS
Status: DISCONTINUED | OUTPATIENT
Start: 2018-03-20 | End: 2018-03-20 | Stop reason: HOSPADM

## 2018-03-20 RX ORDER — SODIUM CHLORIDE, SODIUM LACTATE, POTASSIUM CHLORIDE, CALCIUM CHLORIDE 600; 310; 30; 20 MG/100ML; MG/100ML; MG/100ML; MG/100ML
100 INJECTION, SOLUTION INTRAVENOUS CONTINUOUS
Status: DISCONTINUED | OUTPATIENT
Start: 2018-03-20 | End: 2018-03-20 | Stop reason: HOSPADM

## 2018-03-20 RX ORDER — MIDAZOLAM HYDROCHLORIDE 1 MG/ML
INJECTION, SOLUTION INTRAMUSCULAR; INTRAVENOUS AS NEEDED
Status: DISCONTINUED | OUTPATIENT
Start: 2018-03-20 | End: 2018-03-20 | Stop reason: HOSPADM

## 2018-03-20 RX ORDER — CEFAZOLIN SODIUM/WATER 2 G/20 ML
2 SYRINGE (ML) INTRAVENOUS ONCE
Status: DISCONTINUED | OUTPATIENT
Start: 2018-03-20 | End: 2018-03-20

## 2018-03-20 RX ORDER — ESMOLOL HYDROCHLORIDE 10 MG/ML
INJECTION INTRAVENOUS AS NEEDED
Status: DISCONTINUED | OUTPATIENT
Start: 2018-03-20 | End: 2018-03-20 | Stop reason: HOSPADM

## 2018-03-20 RX ORDER — SODIUM CHLORIDE 0.9 % (FLUSH) 0.9 %
5-10 SYRINGE (ML) INJECTION EVERY 8 HOURS
Status: DISCONTINUED | OUTPATIENT
Start: 2018-03-20 | End: 2018-03-20 | Stop reason: HOSPADM

## 2018-03-20 RX ORDER — LINEZOLID 2 MG/ML
600 INJECTION, SOLUTION INTRAVENOUS
Status: COMPLETED | OUTPATIENT
Start: 2018-03-20 | End: 2018-03-20

## 2018-03-20 RX ORDER — PROPOFOL 10 MG/ML
INJECTION, EMULSION INTRAVENOUS AS NEEDED
Status: DISCONTINUED | OUTPATIENT
Start: 2018-03-20 | End: 2018-03-20 | Stop reason: HOSPADM

## 2018-03-20 RX ORDER — LIDOCAINE HYDROCHLORIDE 20 MG/ML
INJECTION, SOLUTION EPIDURAL; INFILTRATION; INTRACAUDAL; PERINEURAL AS NEEDED
Status: DISCONTINUED | OUTPATIENT
Start: 2018-03-20 | End: 2018-03-20 | Stop reason: HOSPADM

## 2018-03-20 RX ORDER — FENTANYL CITRATE 50 UG/ML
INJECTION, SOLUTION INTRAMUSCULAR; INTRAVENOUS AS NEEDED
Status: DISCONTINUED | OUTPATIENT
Start: 2018-03-20 | End: 2018-03-20 | Stop reason: HOSPADM

## 2018-03-20 RX ORDER — HEPARIN SODIUM 5000 [USP'U]/.5ML
INJECTION, SOLUTION INTRAVENOUS; SUBCUTANEOUS AS NEEDED
Status: DISCONTINUED | OUTPATIENT
Start: 2018-03-20 | End: 2018-03-20 | Stop reason: HOSPADM

## 2018-03-20 RX ADMIN — ESMOLOL HYDROCHLORIDE 10 MG: 10 INJECTION INTRAVENOUS at 13:51

## 2018-03-20 RX ADMIN — MIDAZOLAM HYDROCHLORIDE 2 MG: 1 INJECTION, SOLUTION INTRAMUSCULAR; INTRAVENOUS at 13:27

## 2018-03-20 RX ADMIN — LIDOCAINE HYDROCHLORIDE 40 MG: 20 INJECTION, SOLUTION EPIDURAL; INFILTRATION; INTRACAUDAL; PERINEURAL at 13:33

## 2018-03-20 RX ADMIN — HEPARIN SODIUM 5000 UNITS: 5000 INJECTION, SOLUTION INTRAVENOUS; SUBCUTANEOUS at 13:41

## 2018-03-20 RX ADMIN — SODIUM CHLORIDE, SODIUM LACTATE, POTASSIUM CHLORIDE, AND CALCIUM CHLORIDE 100 ML/HR: 600; 310; 30; 20 INJECTION, SOLUTION INTRAVENOUS at 11:12

## 2018-03-20 RX ADMIN — FENTANYL CITRATE 50 MCG: 50 INJECTION, SOLUTION INTRAMUSCULAR; INTRAVENOUS at 13:29

## 2018-03-20 RX ADMIN — LINEZOLID 600 MG: 600 INJECTION, SOLUTION INTRAVENOUS at 14:10

## 2018-03-20 RX ADMIN — PROPOFOL 100 MG: 10 INJECTION, EMULSION INTRAVENOUS at 13:33

## 2018-03-20 NOTE — DISCHARGE INSTRUCTIONS
Follow up with Dr. Toya Fallon on Monday, 4/23/18 at 11 am at the [place] office to botox of the bladder. Please call Dr. Cha Panda office if unable to void, if you have a fever greater than 101.5      DISCHARGE SUMMARY from your Nurse    The following personal items collected during your admission are returned to you:   Dental Appliance: Dental Appliances: Uppers, With patient (w/ pt belongings)  Vision: Visual Aid: Glasses, With patient  Hearing Aid:    Jewelry: Jewelry: None  Clothing: Clothing: Pants, Undergarments, Shirt, Footwear, Socks (placed in locker)  Other Valuables: Other Valuables: Eyeglasses (w/ pt belongings)  Valuables sent to safe:      PATIENT INSTRUCTIONS:    After general anesthesia or intravenous sedation, for 24 hours or while taking prescription Narcotics:  · Limit your activities  · Do not drive and operate hazardous machinery  · Do not make important personal or business decisions  · Do  not drink alcoholic beverages  · If you have not urinated within 8 hours after discharge, please contact your surgeon on call. Report the following to your surgeon:  · Excessive pain, swelling, redness or odor of or around the surgical area  · Temperature over 100.5  · Nausea and vomiting lasting longer than 4 hours or if unable to take medications  · Any signs of decreased circulation or nerve impairment to extremity: change in color, persistent  numbness, tingling, coldness or increase pain  · Any questions    COUGH AND DEEP BREATHE    Breathing deep and coughing are very important exercises to do after surgery. Deep breathing and coughing open the little air tubes and air sacks in your lungs. You take deep breaths every day. You may not even notice - it is just something you do when you sigh or yawn. It is a natural exercise you do to keep these air passages open. After surgery, take deep breaths and cough, on purpose.       Coughing and deep breathing help prevent bronchitis and pneumonia after surgery. If you had chest or belly surgery, use a pillow as a \"hug esther\" and hold it tightly to your chest or belly when you cough. DIRECTIONS:  6. Take 10 to 15 slow deep breaths every hour while awake. 7. Breathe in deeply, and hold it for 2 seconds. 8. Exhale slowly through puckered lips, like blowing up a balloon. 9. After every 4th or 5th deep breath, hug your pillow to your chest or belly and give a hard, deep cough. Yes, it will probably hurt. But doing this exercise is very important part of healing after surgery. Take your pain medicine to help you do this exercise without too much pain. IF YOU HAVE BEEN DIAGNOSED WITH SLEEP APNEA, PLEASE USE YOUR SLEEP APNEA DEVICE OR CPAP MACHINE WHEN YOU INTEND TO NAP AFTER TAKING PAIN MEDICATION. Ankle Pumps    Ankle pumps increase the circulation of oxygenated blood to your lower extremities and decrease your risk for circulation problems such as blood clots. They also stretch the muscles, tendons and ligaments in your foot and ankle, and prevent joint contracture in the ankle and foot, especially after surgeries on the legs. It is important to do ankle pump exercises regularly after surgery because immobility increases your risk for developing a blood clot. Your doctor may also have you take an Aspirin for the next few days as well. If your doctor did not ask you to take an Aspirin, consult with him before starting Aspirin therapy on your own. Slowly point your foot forward, feeling the muscles on the top of your lower leg stretch, and hold this position for 5 seconds. Next, pull your foot back toward you as far as possible, stretching the calf muscles, and hold that position for 5 seconds. Repeat with the other foot. Perform 10 repetitions every hour while awake for both ankles if possible (down and then up with the foot once is one repetition).     You should feel gentle stretching of the muscles in your lower leg when doing this exercise. If you feel pain, or your range of motion is limited, don't  Push too hard. Only go the limit your joint and muscles will let you go. If you have increasing pain, progressively worsening leg warmth or swelling, STOP the exercise and call your doctor. Below is information about the medications your doctor is prescribing after your visit:    Other information in your discharge envelope:  []     PRESCRIPTIONS  []     PHYSICAL THERAPY PRESCRIPTION  []     APPOINTMENT CARDS  []     Regional Anesthesia Pamphlet for block or block with On-Q Catheter from [facility]  []     Medical device information sheets/pamphlets from their    []     School/work excuse note. []     /parent work excuse note. These are general instructions for a healthy lifestyle:    *  Please give a list of your current medications to your Primary Care Provider. *  Please update this list whenever your medications are discontinued, doses are      changed, or new medications (including over-the-counter products) are added. *  Please carry medication information at all times in case of emergency situations. About Smoking  No smoking / No tobacco products / Avoid exposure to second hand smoke    Surgeon General's Warning:  Quitting smoking now greatly reduces serious risk to your health. Obesity, smoking, and sedentary lifestyle greatly increases your risk for illness and disease. A healthy diet, regular physical exercise & weight monitoring are important for maintaining a healthy lifestyle. Congestive Heart Failure  You may be retaining fluid if you have a history of heart failure or if you experience any of the following symptoms:  Weight gain of 3 pounds or more overnight or 5 pounds in a week, increased swelling in our hands or feet or shortness of breath while lying flat in bed.   Please call your doctor as soon as you notice any of these symptoms; do not wait until your next office visit. Recognize signs and symptoms of STROKE:  F - face looks uneven  A - arms unable to move or move even  S - speech slurred or non-existent  T - time-call 911 as soon as signs and symptoms begin-DO NOT go         Back to bed or wait to see if you get better-TIME IS BRAIN. Warning signs of HEART ATTACK  Call 911 if you have these symptoms    · Chest discomfort. Most heart attacks involve discomfort in the center of the chest that lasts more than a few minutes, or that goes away and comes back. It can feel like uncomfortable pressure, squeezing, fullness, or pain. · Discomfort in other areas of the upper body. Symptoms can include pain or discomfort in one or both        Arms, the back, neck, jaw, or stomach. ·  Shortness of breath with or without chest discomfort. · Other signs may include breaking out in a cold sweat, nausea, or lightheadedness    Don't wait more than five minutes to call 911 - MINUTES MATTER! Fast action can save your life. Calling 911 is almost always the fastest way to get lifesaving treatment. Emergency Medical Services staff can begin treatment when they arrive - up to an hour sooner than if someone gets to the hospital by car. [facility] MEDICATION AND [facility]FFECT GUIDE    The [employer] MEDICATION AND SIDE EFFECT GUIDE was provided to the PATIENT AND CARE PROVIDER. Information provided includes instruction about drug purpose and common side effects for the following medications:    Patient not sent home with any prescriptions                DISCHARGE SUMMARY from your Nurse    The following personal items collected during your admission are returned to you:   Dental Appliance: Dental Appliances: Uppers, With patient (w/ pt belongings)  Vision: Visual Aid: Glasses, With patient  Hearing Aid:    Jewelry: Jewelry: None  Clothing: Clothing: Pants, Undergarments, Shirt, Footwear, Socks (placed in locker)  Other Valuables:  Other Valuables: Eyeglasses (w/ pt belongings)  Valuables sent to safe:      PATIENT INSTRUCTIONS:    After general anesthesia or intravenous sedation, for 24 hours or while taking prescription Narcotics:  Limit your activities  Do not drive and operate hazardous machinery  Do not make important personal or business decisions  Do  not drink alcoholic beverages  If you have not urinated within 8 hours after discharge, please contact your surgeon on call. Report the following to your surgeon:  Excessive pain, swelling, redness or odor of or around the surgical area  Temperature over 100.5  Nausea and vomiting lasting longer than 4 hours or if unable to take medications  Any signs of decreased circulation or nerve impairment to extremity: change in color, persistent  numbness, tingling, coldness or increase pain  Any questions    COUGH AND DEEP BREATHE    Breathing deep and coughing are very important exercises to do after surgery. Deep breathing and coughing open the little air tubes and air sacks in your lungs. You take deep breaths every day. You may not even notice - it is just something you do when you sigh or yawn. It is a natural exercise you do to keep these air passages open. After surgery, take deep breaths and cough, on purpose. Coughing and deep breathing help prevent bronchitis and pneumonia after surgery. If you had chest or belly surgery, use a pillow as a \"hug buddy\" and hold it tightly to your chest or belly when you cough. DIRECTIONS:  Take 10 to 15 slow deep breaths every hour while awake. Breathe in deeply, and hold it for 2 seconds. Exhale slowly through puckered lips, like blowing up a balloon. After every 4th or 5th deep breath, hug your pillow to your chest or belly and give a hard, deep cough. Yes, it will probably hurt. But doing this exercise is very important part of healing after surgery.   Take your pain medicine to help you do this exercise without too much pain.    IF YOU HAVE BEEN DIAGNOSED WITH SLEEP APNEA, PLEASE USE YOUR SLEEP APNEA DEVICE OR CPAP MACHINE WHEN YOU INTEND TO NAP AFTER TAKING PAIN MEDICATION. Ankle Pumps    Ankle pumps increase the circulation of oxygenated blood to your lower extremities and decrease your risk for circulation problems such as blood clots. They also stretch the muscles, tendons and ligaments in your foot and ankle, and prevent joint contracture in the ankle and foot, especially after surgeries on the legs. It is important to do ankle pump exercises regularly after surgery because immobility increases your risk for developing a blood clot. Your doctor may also have you take an Aspirin for the next few days as well. If your doctor did not ask you to take an Aspirin, consult with him before starting Aspirin therapy on your own. Slowly point your foot forward, feeling the muscles on the top of your lower leg stretch, and hold this position for 5 seconds. Next, pull your foot back toward you as far as possible, stretching the calf muscles, and hold that position for 5 seconds. Repeat with the other foot. Perform 10 repetitions every hour while awake for both ankles if possible (down and then up with the foot once is one repetition). You should feel gentle stretching of the muscles in your lower leg when doing this exercise. If you feel pain, or your range of motion is limited, don't  Push too hard. Only go the limit your joint and muscles will let you go. If you have increasing pain, progressively worsening leg warmth or swelling, STOP the exercise and call your doctor.      Below is information about the medications your doctor is prescribing after your visit:    Other information in your discharge envelope:  []     PRESCRIPTIONS  []     PHYSICAL THERAPY PRESCRIPTION  []     APPOINTMENT CARDS  []     Regional Anesthesia Pamphlet for block or block with On-Q Catheter from [facility]  []     Medical device information sheets/pamphlets from their    []     School/work excuse note. []     /parent work excuse note. These are general instructions for a healthy lifestyle:    *  Please give a list of your current medications to your Primary Care Provider. *  Please update this list whenever your medications are discontinued, doses are      changed, or new medications (including over-the-counter products) are added. *  Please carry medication information at all times in case of emergency situations. About Smoking  No smoking / No tobacco products / Avoid exposure to second hand smoke    Surgeon General's Warning:  Quitting smoking now greatly reduces serious risk to your health. Obesity, smoking, and sedentary lifestyle greatly increases your risk for illness and disease. A healthy diet, regular physical exercise & weight monitoring are important for maintaining a healthy lifestyle. Congestive Heart Failure  You may be retaining fluid if you have a history of heart failure or if you experience any of the following symptoms:  Weight gain of 3 pounds or more overnight or 5 pounds in a week, increased swelling in our hands or feet or shortness of breath while lying flat in bed. Please call your doctor as soon as you notice any of these symptoms; do not wait until your next office visit. Recognize signs and symptoms of STROKE:  F - face looks uneven  A - arms unable to move or move even  S - speech slurred or non-existent  T - time-call 911 as soon as signs and symptoms begin-DO NOT go         Back to bed or wait to see if you get better-TIME IS BRAIN. Warning signs of HEART ATTACK  Call 911 if you have these symptoms    Chest discomfort. Most heart attacks involve discomfort in the center of the chest that lasts more than a few minutes, or that goes away and comes back. It can feel like uncomfortable pressure, squeezing, fullness, or pain.   Discomfort in other areas of the upper body. Symptoms can include pain or discomfort in one or both        Arms, the back, neck, jaw, or stomach. Shortness of breath with or without chest discomfort. Other signs may include breaking out in a cold sweat, nausea, or lightheadedness    Don't wait more than five minutes to call 911 - MINUTES MATTER! Fast action can save your life. Calling 911 is almost always the fastest way to get lifesaving treatment. Emergency Medical Services staff can begin treatment when they arrive - up to an hour sooner than if someone gets to the hospital by car. [facility] MEDICATION AND SIDE EFFECT GUIDE    The [employer] MEDICATION AND SIDE EFFECT GUIDE was provided to the PATIENT AND CARE PROVIDER.   Information provided includes instruction about drug purpose and common side effects for the following medications:        ·

## 2018-03-20 NOTE — H&P (VIEW-ONLY)
PAT Pre-Op History & Physical    Patient: José Miguel An                  MRN: [id number]          SSN: [id number]  YOB: 1955          Age: 59 y.o. Sex: female                Subjective:   Patient is a 59 y.o.  female who presents with history of left hydronephrosis that has been attributed to ureteral narrowing secondary to radiation therapy for cervical cancer. Has solitary left kidney due to right nephrectomy in 1998. Requires periodic cystoscopies and stent exchanges- last was done 9/13/2017. The patient was evaluated in the surgeon's office and it was determined that the most appropriate plan of care is to proceed with surgical intervention. Patient's caregiver- Briana Bustamante- is present and helps answer questions. Patient's  CARRIE Anne    Patient was seen in ED 02/27/2018 for a dislocated right shoulder after she fell at home. Seeing Dr. Pia Oneal for orthopedic follow up. Past Medical History:   Diagnosis Date    Anemia     Chronic kidney disease     stage 3    Coronary artery disease 9/17/2014    A. Echo (9/16/14):  EF 45% with mid-distal septal/anterior HK, DD.   PASP 37.    Coronary artery disease     MI 9/2014    Depression     Diabetes ([address]     DM type 2 causing renal disease ([address]     History of nephrectomy     Right kidney    Hx MRSA infection     had negative swab [id number] but + swab on 5/16/2017, negative swab 09/06/2017    Hyperlipidemia     Hypertension     Mental retardation     Nephrolithiasis     hx    Uterine cancer ([address]     hx    UTI (lower urinary tract infection)     Vitamin D deficiency       Past Surgical History:   Procedure Laterality Date    HX CHOLECYSTECTOMY      HX DILATION AND CURETTAGE      HX HYSTERECTOMY      HX ORTHOPAEDIC      ORIF left arm, hx of fracture    HX ORTHOPAEDIC      Right ankle fx repair    HX OTHER SURGICAL  9/2014    I&D right leg wound    HX UROLOGICAL  1998    right nephrectomy    HX UROLOGICAL Left     multiple renal stents      Prior to Admission medications    Medication Sig Start Date End Date Taking? Authorizing Provider   oxyCODONE IR (ROXICODONE) 5 mg immediate release tablet Take 5 mg by mouth every four (4) hours as needed for Pain (#40 RX from Dr Danielle Kearns). Historical Provider   atorvastatin (LIPITOR) 10 mg tablet take 1 tablet by mouth once daily 1/23/18   CARRIE Zeng   pioglitazone (ACTOS) 30 mg tablet Take 1 Tab by mouth daily. Please provide generic only 12/8/17   Nathanael Schmitz MD   metoprolol tartrate (LOPRESSOR) 25 mg tablet take 1/2 tablet by mouth every 12 hours 8/16/17   CARRIE Rock   glipiZIDE SR (GLUCOTROL XL) 2.5 mg CR tablet take 1 tablet by mouth twice a day  Patient taking differently: take 1 tablet by mouth 8/1/17   CARRIE Ramires   tolterodine ER (DETROL-LA) 2 mg ER capsule Take 1 Cap by mouth nightly. 6/30/17   CARRIE Zeng   sertraline (ZOLOFT) 50 mg tablet take 1 tablet by mouth once daily BEFORE BREAKFAST 5/10/17   CARRIE Zeng   aspirin delayed-release 81 mg tablet Take 81 mg by mouth nightly. Historical Provider   multivitamin with iron tablet Take 1 Tab by mouth daily. Historical Provider   cyanocobalamin 1,000 mcg tablet Take 1 Tab by mouth daily. For B12 2/24/14   Karin Regan NP     Current Outpatient Prescriptions   Medication Sig    oxyCODONE IR (ROXICODONE) 5 mg immediate release tablet Take 5 mg by mouth every four (4) hours as needed for Pain (#40 RX from Dr Danielle Kearns).  atorvastatin (LIPITOR) 10 mg tablet take 1 tablet by mouth once daily    pioglitazone (ACTOS) 30 mg tablet Take 1 Tab by mouth daily.  Please provide generic only    metoprolol tartrate (LOPRESSOR) 25 mg tablet take 1/2 tablet by mouth every 12 hours    glipiZIDE SR (GLUCOTROL XL) 2.5 mg CR tablet take 1 tablet by mouth twice a day (Patient taking differently: take 1 tablet by mouth)    tolterodine ER (DETROL-LA) 2 mg ER capsule Take 1 Cap by mouth nightly.  sertraline (ZOLOFT) 50 mg tablet take 1 tablet by mouth once daily BEFORE BREAKFAST    aspirin delayed-release 81 mg tablet Take 81 mg by mouth nightly.  multivitamin with iron tablet Take 1 Tab by mouth daily.  cyanocobalamin 1,000 mcg tablet Take 1 Tab by mouth daily. For B12     No current facility-administered medications for this encounter. Allergies   Allergen Reactions    Hydrocodone Nausea Only    Ibuprofen Unknown (comments)     Pt cannot take because of having only one kidney    Penicillins Rash     Tolerates Cephalexin       Social History   Substance Use Topics    Smoking status: Never Smoker    Smokeless tobacco: Never Used    Alcohol use No      History   Drug Use No     Family History   Problem Relation Age of Onset    Mental Retardation Mother     Heart Disease Mother     Breast Cancer Sister     No Known Problems Brother     Malignant Hyperthermia Neg Hx     Pseudocholinesterase Deficiency Neg Hx     Delayed Awakening Neg Hx     Post-op Nausea/Vomiting Neg Hx     Emergence Delirium Neg Hx     Post-op Cognitive Dysfunction Neg Hx          Review of Systems    Patient denies difficulty swallowing, mouth sores, or loose teeth. Patient denies any recent dental procedures or any planned prior to surgery. Patient denies chest pain, tightness, pain radiating down left arm, palpitations. Denies dizziness, visual disturbances, or lightheadedness. Patient denies shortness of breath, wheezing, cough, fever, or chills. Patient denies diarrhea, constipation, or abdominal pain. Patient denies urinary problems including dysuria, hesitancy, urgency, or incontinence. Denies skin breakdown, rashes, insect bites or open area.          Objective:   Patient Vitals for the past 24 hrs:   Temp Pulse Resp BP SpO2   18 1048 98.5 °F (36.9 °C) 74 22 141/71 100 %     Temp (24hrs), Av.5 °F (36.9 °C), Min:98.5 °F (36.9 °C), Max:98.5 °F (36.9 °C)    Body mass index is 35.47 kg/(m^2). Wt Readings from Last 1 Encounters:   03/14/18 66.7 kg (147 lb 1.6 oz)        Physical Exam:     General: Pleasant,  cooperative, no apparent distress, appears stated age. Eyes: Conjunctivae/corneas clear. EOMs intact. Nose: Nares normal.   Mouth/Throat: Lips, mucosa, and tongue normal. Top dentures in place- no lower teeth. Neck: Supple, symmetrical, trachea midline. Back: Symmetric   Lungs: Clear to auscultation bilaterally. Heart: Regular rate and rhythm, S1, S2 normal. No murmur, click, rub or gallop. Abdomen: Soft, non-tender. Bowel sounds normal. No distention. Musculoskeletal:  ROM right shoulder limited by discomfort. Extremities:  Extremities normal, atraumatic, no cyanosis or edema. Calves                                 supple, non tender to palpation. Pulses: 2+ and symmetric bilateral upper extremities. Cap. refill <2 seconds   Skin: Skin color, texture, turgor normal.  No rashes or lesions. Neurologic: CN II-XII grossly intact. Alert and oriented x3. Labs:   Recent Results (from the past 72 hour(s))   CBC WITH AUTOMATED DIFF    Collection Time: 03/14/18 11:53 AM   Result Value Ref Range    WBC 6.0 3.6 - 11.0 K/uL    RBC 3.50 (L) 3.80 - 5.20 M/uL    HGB 9.3 (L) 11.5 - 16.0 g/dL    HCT 31.8 (L) 35.0 - 47.0 %    MCV 90.9 80.0 - 99.0 FL    MCH 26.6 26.0 - 34.0 PG    MCHC 29.2 (L) 30.0 - 36.5 g/dL    RDW 15.3 (H) 11.5 - 14.5 %    PLATELET 066 388 - 755 K/uL    MPV 10.0 8.9 - 12.9 FL    NRBC 0.0 0  WBC    ABSOLUTE NRBC 0.00 0.00 - 0.01 K/uL    NEUTROPHILS 66 32 - 75 %    LYMPHOCYTES 21 12 - 49 %    MONOCYTES 9 5 - 13 %    EOSINOPHILS 2 0 - 7 %    BASOPHILS 1 0 - 1 %    IMMATURE GRANULOCYTES 0 0.0 - 0.5 %    ABS. NEUTROPHILS 4.0 1.8 - 8.0 K/UL    ABS. LYMPHOCYTES 1.3 0.8 - 3.5 K/UL    ABS. MONOCYTES 0.6 0.0 - 1.0 K/UL    ABS. EOSINOPHILS 0.1 0.0 - 0.4 K/UL    ABS. BASOPHILS 0.1 0.0 - 0.1 K/UL    ABS. IMM.  GRANS. 0.0 0.00 - 0.04 K/UL    DF AUTOMATED     METABOLIC PANEL, COMPREHENSIVE    Collection Time: 03/14/18 11:53 AM   Result Value Ref Range    Sodium 142 136 - 145 mmol/L    Potassium 5.0 3.5 - 5.1 mmol/L    Chloride 108 97 - 108 mmol/L    CO2 22 21 - 32 mmol/L    Anion gap 12 5 - 15 mmol/L    Glucose 110 (H) 65 - 100 mg/dL    BUN 37 (H) 6 - 20 MG/DL    Creatinine 1.41 (H) 0.55 - 1.02 MG/DL    BUN/Creatinine ratio 26 (H) 12 - 20      GFR est AA 46 (L) >60 ml/min/1.73m2    GFR est non-AA 38 (L) >60 ml/min/1.73m2    Calcium 9.0 8.5 - 10.1 MG/DL    Bilirubin, total 0.2 0.2 - 1.0 MG/DL    ALT (SGPT) 18 12 - 78 U/L    AST (SGOT) 21 15 - 37 U/L    Alk.  phosphatase 141 (H) 45 - 117 U/L    Protein, total 7.5 6.4 - 8.2 g/dL    Albumin 2.9 (L) 3.5 - 5.0 g/dL    Globulin 4.6 (H) 2.0 - 4.0 g/dL    A-G Ratio 0.6 (L) 1.1 - 2.2     URINALYSIS W/ REFLEX CULTURE    Collection Time: 03/14/18 11:53 AM   Result Value Ref Range    Color YELLOW/STRAW      Appearance TURBID (A) CLEAR      Specific gravity 1.010 1.003 - 1.030      pH (UA) 6.0 5.0 - 8.0      Protein 100 (A) NEG mg/dL    Glucose NEGATIVE  NEG mg/dL    Ketone NEGATIVE  NEG mg/dL    Bilirubin NEGATIVE  NEG      Blood LARGE (A) NEG      Urobilinogen 0.2 0.2 - 1.0 EU/dL    Nitrites POSITIVE (A) NEG      Leukocyte Esterase LARGE (A) NEG      WBC >100 (H) 0 - 4 /hpf    RBC  0 - 5 /hpf    Epithelial cells FEW FEW /lpf    Bacteria 1+ (A) NEG /hpf    UA:UC IF INDICATED URINE CULTURE ORDERED (A) CNI     EKG, 12 LEAD, INITIAL    Collection Time: 03/14/18 12:24 PM   Result Value Ref Range    Ventricular Rate 73 BPM    Atrial Rate 73 BPM    P-R Interval 148 ms    QRS Duration 80 ms    Q-T Interval 348 ms    QTC Calculation (Bezet) 383 ms    Calculated P Axis 46 degrees    Calculated R Axis 5 degrees    Calculated T Axis 29 degrees    Diagnosis       Normal sinus rhythm  Minimal voltage criteria for LVH, may be normal variant  Borderline ECG  When compared with ECG of 16-MAY-2017 15:58,  No significant change was found         Assessment:     Hydronephrosis. Plan:     Scheduled for cystoscopy left retrograde left ureteral stent exchange. Labs and EKG done per surgeon's orders. Labs reviewed- multiple abnormal values but seems to be patient's baseline. UA triggered C&S pending. Final EKG reading  pending. MRSA nasal swab sent per protocol due to history of prior + test and only 1 recorded negative since that time. MRSA result pending.       Federico Verma NP

## 2018-03-20 NOTE — IP AVS SNAPSHOT
[address] 
[phone] Patient: Shaun Cain MRN: [id number] LBV:[id number] A check grabiel indicates which time of day the medication should be taken. My Medications CHANGE how you take these medications Instructions Each Dose to Equal  
 Morning Noon Evening Bedtime  
 glipiZIDE SR 2.5 mg CR tablet Commonly known as:  GLUCOTROL XL What changed:  See the new instructions. Your last dose was: Your next dose is:    
   
   
 take 1 tablet by mouth twice a day CONTINUE taking these medications Instructions Each Dose to Equal  
 Morning Noon Evening Bedtime  
 aspirin delayed-release 81 mg tablet Your last dose was: Your next dose is: Take 81 mg by mouth nightly. 81 mg  
    
   
   
   
  
 atorvastatin 10 mg tablet Commonly known as:  LIPITOR Your last dose was: Your next dose is:    
   
   
 take 1 tablet by mouth once daily  
     
   
   
   
  
 cyanocobalamin 1,000 mcg tablet Your last dose was: Your next dose is: Take 1 Tab by mouth daily. For B12  
 1000 mcg  
    
   
   
   
  
 metoprolol tartrate 25 mg tablet Commonly known as:  LOPRESSOR Your last dose was: Your next dose is:    
   
   
 take 1/2 tablet by mouth every 12 hours  
     
   
   
   
  
 multivitamin with iron tablet Your last dose was: Your next dose is: Take 1 Tab by mouth daily. 1 Tab OTHER Your last dose was: Your next dose is:    
   
   
 Indications: Antibiotic started on friday 3/16/18, unsure of name  
     
   
   
   
  
 oxyCODONE IR 5 mg immediate release tablet Commonly known as:  Ghulam Lerner Your last dose was: Your next dose is: Take 5 mg by mouth every four (4) hours as needed for Pain (#40 RX from Ortho, Dr Jose Henning). 5 mg  
    
   
   
   
  
 pioglitazone 30 mg tablet Commonly known as:  ACTOS Your last dose was: Your next dose is: Take 1 Tab by mouth daily. Please provide generic only 30 mg  
    
   
   
   
  
 sertraline 50 mg tablet Commonly known as:  ZOLOFT Your last dose was: Your next dose is:    
   
   
 take 1 tablet by mouth once daily BEFORE BREAKFAST  
     
   
   
   
  
 tolterodine ER 2 mg ER capsule Commonly known as:  DETROL-LA Your last dose was: Your next dose is: Take 1 Cap by mouth nightly. 2 mg

## 2018-03-20 NOTE — ANESTHESIA PREPROCEDURE EVALUATION
Anesthetic History   No history of anesthetic complications            Review of Systems / Medical History  Patient summary reviewed, nursing notes reviewed and pertinent labs reviewed    Pulmonary  Within defined limits                 Neuro/Psych         Psychiatric history    Comments: Mental retardation Cardiovascular    Hypertension: well controlled          Past MI, CAD and hyperlipidemia  Pertinent negatives: No cardiac stents  Exercise tolerance: <4 METS     GI/Hepatic/Renal  Within defined limits             Comments: S/p nephrectomy Endo/Other    Diabetes: poorly controlled    Anemia     Other Findings              Physical Exam    Airway  Mallampati: II  TM Distance: < 4 cm  Neck ROM: normal range of motion   Mouth opening: Normal     Cardiovascular  Regular rate and rhythm,  S1 and S2 normal,  no murmur, click, rub, or gallop  Rhythm: regular  Rate: normal      Pertinent negatives: No murmur   Dental    Dentition: Edentulous and Full upper dentures     Pulmonary  Breath sounds clear to auscultation               Abdominal  GI exam deferred       Other Findings            Anesthetic Plan    ASA: 3  Anesthesia type: general          Induction: Intravenous  Anesthetic plan and risks discussed with: Patient      Informed consent obtained.

## 2018-03-20 NOTE — ANESTHESIA POSTPROCEDURE EVALUATION
Post-Anesthesia Evaluation and Assessment    Patient: Jasper Silva MRN: [id number]  SSN: [id number]    YOB: 1955  Age: 59 y.o. Sex: female       Cardiovascular Function/Vital Signs  Visit Vitals    /74    Pulse (!) 115    Temp 36.8 °C (98.3 °F)    Resp 15    SpO2 99%       Patient is status post general anesthesia for Procedure(s):  CYSTOSCOPY LEFT RETROGRADE, LEFT STENT CHANGE. Nausea/Vomiting: None    Postoperative hydration reviewed and adequate. Pain:  Pain Scale 1: Numeric (0 - 10) (03/20/18 1431)  Pain Intensity 1: 0 (03/20/18 1431)   Managed    Neurological Status:   Neuro (WDL): Within Defined Limits (03/20/18 1420)   At baseline    Mental Status and Level of Consciousness: Alert and oriented     Pulmonary Status:   O2 Device: Room air (03/20/18 1420)   Adequate oxygenation and airway patent    Complications related to anesthesia: None    Post-anesthesia assessment completed.  No concerns    Signed By: Taz Teran DO     March 20, 2018

## 2018-03-20 NOTE — IP AVS SNAPSHOT
[address] 
[phone] Patient: José Miguel An MRN: [id number] About your hospitalization You were admitted on:  March 20, 2018 You last received care in the:  [facility] PACU You were discharged on:  March 20, 2018 Why you were hospitalized Your primary diagnosis was:  Not on File Follow-up Information Follow up With Details Comments Contact Rosalee Mtz 89, [place]   [address] [facility] 57 
[phone] Your Scheduled Appointments Monday March 26, 2018  2:00 PM EDT Follow Up with Kayla Yee NP  
[facility] 250 [facility] 210 [facility] 57  
[phone] Discharge Orders None A check grabiel indicates which time of day the medication should be taken. My Medications CHANGE how you take these medications Instructions Each Dose to Equal  
 Morning Noon Evening Bedtime  
 glipiZIDE SR 2.5 mg CR tablet Commonly known as:  GLUCOTROL XL What changed:  See the new instructions. Your last dose was: Your next dose is:    
   
   
 take 1 tablet by mouth twice a day CONTINUE taking these medications Instructions Each Dose to Equal  
 Morning Noon Evening Bedtime  
 aspirin delayed-release 81 mg tablet Your last dose was: Your next dose is: Take 81 mg by mouth nightly. 81 mg  
    
   
   
   
  
 atorvastatin 10 mg tablet Commonly known as:  LIPITOR Your last dose was: Your next dose is:    
   
   
 take 1 tablet by mouth once daily  
     
   
   
   
  
 cyanocobalamin 1,000 mcg tablet Your last dose was: Your next dose is: Take 1 Tab by mouth daily. For B12  
 1000 mcg  
    
   
   
   
  
 metoprolol tartrate 25 mg tablet Commonly known as:  LOPRESSOR Your last dose was: Your next dose is:    
   
   
 take 1/2 tablet by mouth every 12 hours  
     
   
   
   
  
 multivitamin with iron tablet Your last dose was: Your next dose is: Take 1 Tab by mouth daily. 1 Tab OTHER Your last dose was: Your next dose is:    
   
   
 Indications: Antibiotic started on friday 3/16/18, unsure of name  
     
   
   
   
  
 oxyCODONE IR 5 mg immediate release tablet Commonly known as:  Gonzalez Cam Your last dose was: Your next dose is: Take 5 mg by mouth every four (4) hours as needed for Pain (#40 RX from Ortho, Dr Soren Charles). 5 mg  
    
   
   
   
  
 pioglitazone 30 mg tablet Commonly known as:  ACTOS Your last dose was: Your next dose is: Take 1 Tab by mouth daily. Please provide generic only 30 mg  
    
   
   
   
  
 sertraline 50 mg tablet Commonly known as:  ZOLOFT Your last dose was: Your next dose is:    
   
   
 take 1 tablet by mouth once daily BEFORE BREAKFAST  
     
   
   
   
  
 tolterodine ER 2 mg ER capsule Commonly known as:  DETROL-LA Your last dose was: Your next dose is: Take 1 Cap by mouth nightly. 2 mg Discharge Instructions Follow up with Dr. Julia Crane on Monday, 4/23/18 at 11 am at the [facility] office to botox of the bladder. Please call Dr. Philip Maynard office if unable to void, if you have a fever greater than 101.5 DISCHARGE SUMMARY from your Nurse The following personal items collected during your admission are returned to you:  
Dental Appliance: Dental Appliances: Uppers, With patient (w/ pt belongings) Vision: Visual Aid: Glasses, With patient Hearing Aid:   
Jewelry: Jewelry: None Clothing: Clothing: Pants, Undergarments, Shirt, Footwear, Socks (placed in locker) Other Valuables: Other Valuables: Eyeglasses (w/ pt belongings) Valuables sent to safe:   
 
PATIENT INSTRUCTIONS: 
 
After general anesthesia or intravenous sedation, for 24 hours or while taking prescription Narcotics: · Limit your activities · Do not drive and operate hazardous machinery · Do not make important personal or business decisions · Do  not drink alcoholic beverages · If you have not urinated within 8 hours after discharge, please contact your surgeon on call. Report the following to your surgeon: 
· Excessive pain, swelling, redness or odor of or around the surgical area · Temperature over 100.5 · Nausea and vomiting lasting longer than 4 hours or if unable to take medications · Any signs of decreased circulation or nerve impairment to extremity: change in color, persistent  numbness, tingling, coldness or increase pain · Any questions [address] Breathing deep and coughing are very important exercises to do after surgery. Deep breathing and coughing open the little air tubes and air sacks in your lungs. You take deep breaths every day. You may not even notice - it is just something you do when you sigh or yawn. It is a natural exercise you do to keep these air passages open. After surgery, take deep breaths and cough, on purpose. Coughing and deep breathing help prevent bronchitis and pneumonia after surgery. If you had chest or belly surgery, use a pillow as a \"hug buddy\" and hold it tightly to your chest or belly when you cough. DIRECTIONS: 
6. Take 10 to 15 slow deep breaths every hour while awake. 7. Breathe in deeply, and hold it for 2 seconds. 8. Exhale slowly through puckered lips, like blowing up a balloon. 9. After every 4th or 5th deep breath, hug your pillow to your chest or belly and give a hard, deep cough. Yes, it will probably hurt.   But doing this exercise is very important part of healing after surgery. Take your pain medicine to help you do this exercise without too much pain. IF YOU HAVE BEEN DIAGNOSED WITH SLEEP APNEA, PLEASE USE YOUR SLEEP APNEA DEVICE OR CPAP MACHINE WHEN YOU INTEND TO NAP AFTER TAKING PAIN MEDICATION. Ankle Pumps Ankle pumps increase the circulation of oxygenated blood to your lower extremities and decrease your risk for circulation problems such as blood clots. They also stretch the muscles, tendons and ligaments in your foot and ankle, and prevent joint contracture in the ankle and foot, especially after surgeries on the legs. It is important to do ankle pump exercises regularly after surgery because immobility increases your risk for developing a blood clot. Your doctor may also have you take an Aspirin for the next few days as well. If your doctor did not ask you to take an Aspirin, consult with him before starting Aspirin therapy on your own. Slowly point your foot forward, feeling the muscles on the top of your lower leg stretch, and hold this position for 5 seconds. Next, pull your foot back toward you as far as possible, stretching the calf muscles, and hold that position for 5 seconds. Repeat with the other foot. Perform 10 repetitions every hour while awake for both ankles if possible (down and then up with the foot once is one repetition). You should feel gentle stretching of the muscles in your lower leg when doing this exercise. If you feel pain, or your range of motion is limited, don't  Push too hard. Only go the limit your joint and muscles will let you go. If you have increasing pain, progressively worsening leg warmth or swelling, STOP the exercise and call your doctor. Below is information about the medications your doctor is prescribing after your visit: 
 
Other information in your discharge envelope: 
[]     PRESCRIPTIONS []     PHYSICAL THERAPY PRESCRIPTION 
 []     APPOINTMENT CARDS []     Regional Anesthesia Pamphlet for block or block with On-Q Catheter from Anest[facility] Service []     Medical device information sheets/pamphlets from their   
[]     School/work excuse note. []     /parent work excuse note. These are general instructions for a healthy lifestyle: *  Please give a list of your current medications to your Primary Care Provider. *  Please update this list whenever your medications are discontinued, doses are 
    changed, or new medications (including over-the-counter products) are added. *  Please carry medication information at all times in case of emergency situations. About Smoking No smoking / No tobacco products / Avoid exposure to second hand smoke Surgeon General's Warning:  Quitting smoking now greatly reduces serious risk to your health. Obesity, smoking, and sedentary lifestyle greatly increases your risk for illness and disease. A healthy diet, regular physical exercise & weight monitoring are important for maintaining a healthy lifestyle. Congestive Heart Failure You may be retaining fluid if you have a history of heart failure or if you experience any of the following symptoms:  Weight gain of 3 pounds or more overnight or 5 pounds in a week, increased swelling in our hands or feet or shortness of breath while lying flat in bed. Please call your doctor as soon as you notice any of these symptoms; do not wait until your next office visit. Recognize signs and symptoms of STROKE: 
F - face looks uneven A - arms unable to move or move even S - speech slurred or non-existent T - time-call 911 as soon as signs and symptoms begin-DO NOT go Back to bed or wait to see if you get better-TIME IS BRAIN. Warning signs of HEART ATTACK Call 911 if you have these symptoms · Chest discomfort.   Most heart attacks involve discomfort in the center of the chest that lasts more than a few minutes, or that goes away and comes back. It can feel like uncomfortable pressure, squeezing, fullness, or pain. · Discomfort in other areas of the upper body. Symptoms can include pain or discomfort in one or both Arms, the back, neck, jaw, or stomach. ·  Shortness of breath with or without chest discomfort. · Other signs may include breaking out in a cold sweat, nausea, or lightheadedness Don't wait more than five minutes to call 911 - MINUTES MATTER! Fast action can save your life. Calling 911 is almost always the fastest way to get lifesaving treatment. Emergency Medical Services staff can begin treatment when they arrive - up to an hour sooner than if someone gets to the hospital by car. [facility] MEDICATION AND SIDE EFFECT GUIDE The [address] [place] [address] [place] EFFECT GUIDE was provided to the PATIENT AND CARE PROVIDER. Information provided includes instruction about drug purpose and common side effects for the following medications: 
 
Patient not sent home with any prescriptions DISCHARGE SUMMARY from your Nurse The following personal items collected during your admission are returned to you:  
Dental Appliance: Dental Appliances: Uppers, With patient (w/ pt belongings) Vision: Visual Aid: Glasses, With patient Hearing Aid:   
Jewelry: Jewelry: None Clothing: Clothing: Pants, Undergarments, Shirt, Footwear, Socks (placed in locker) Other Valuables: Other Valuables: Eyeglasses (w/ pt belongings) Valuables sent to safe:   
 
PATIENT INSTRUCTIONS: 
 
After general anesthesia or intravenous sedation, for 24 hours or while taking prescription Narcotics: 
Limit your activities Do not drive and operate hazardous machinery Do not make important personal or business decisions Do  not drink alcoholic beverages If you have not urinated within 8 hours after discharge, please contact your surgeon on call. Report the following to your surgeon: Excessive pain, swelling, redness or odor of or around the surgical area Temperature over 100.5 Nausea and vomiting lasting longer than 4 hours or if unable to take medications Any signs of decreased circulation or nerve impairment to extremity: change in color, persistent  numbness, tingling, coldness or increase pain Any questions [address] Breathing deep and coughing are very important exercises to do after surgery. Deep breathing and coughing open the little air tubes and air sacks in your lungs. You take deep breaths every day. You may not even notice - it is just something you do when you sigh or yawn. It is a natural exercise you do to keep these air passages open. After surgery, take deep breaths and cough, on purpose. Coughing and deep breathing help prevent bronchitis and pneumonia after surgery. If you had chest or belly surgery, use a pillow as a \"hug esther\" and hold it tightly to your chest or belly when you cough. DIRECTIONS: 
Take 10 to 15 slow deep breaths every hour while awake. Breathe in deeply, and hold it for 2 seconds. Exhale slowly through puckered lips, like blowing up a balloon. After every 4th or 5th deep breath, hug your pillow to your chest or belly and give a hard, deep cough. Yes, it will probably hurt. But doing this exercise is very important part of healing after surgery. Take your pain medicine to help you do this exercise without too much pain. IF YOU HAVE BEEN DIAGNOSED WITH SLEEP APNEA, PLEASE USE YOUR SLEEP APNEA DEVICE OR CPAP MACHINE WHEN YOU INTEND TO NAP AFTER TAKING PAIN MEDICATION. Ankle Pumps Ankle pumps increase the circulation of oxygenated blood to your lower extremities and decrease your risk for circulation problems such as blood clots.  They also stretch the muscles, tendons and ligaments in your foot and ankle, and prevent joint contracture in the ankle and foot, especially after surgeries on the legs. It is important to do ankle pump exercises regularly after surgery because immobility increases your risk for developing a blood clot. Your doctor may also have you take an Aspirin for the next few days as well. If your doctor did not ask you to take an Aspirin, consult with him before starting Aspirin therapy on your own. Slowly point your foot forward, feeling the muscles on the top of your lower leg stretch, and hold this position for 5 seconds. Next, pull your foot back toward you as far as possible, stretching the calf muscles, and hold that position for 5 seconds. Repeat with the other foot. Perform 10 repetitions every hour while awake for both ankles if possible (down and then up with the foot once is one repetition). You should feel gentle stretching of the muscles in your lower leg when doing this exercise. If you feel pain, or your range of motion is limited, don't  Push too hard. Only go the limit your joint and muscles will let you go. If you have increasing pain, progressively worsening leg warmth or swelling, STOP the exercise and call your doctor. Below is information about the medications your doctor is prescribing after your visit: 
 
Other information in your discharge envelope: 
[]     PRESCRIPTIONS []     PHYSICAL THERAPY PRESCRIPTION 
[]     APPOINTMENT CARDS []     Regional Anesthesia Pamphlet for block or block with On-Q Catheter from [facility] []     Medical device information sheets/pamphlets from their   
[]     School/work excuse note. []     /parent work excuse note. These are general instructions for a healthy lifestyle: *  Please give a list of your current medications to your Primary Care Provider.  
*  Please update this list whenever your medications are discontinued, doses are 
 changed, or new medications (including over-the-counter products) are added. *  Please carry medication information at all times in case of emergency situations. About Smoking No smoking / No tobacco products / Avoid exposure to second hand smoke Surgeon General's Warning:  Quitting smoking now greatly reduces serious risk to your health. Obesity, smoking, and sedentary lifestyle greatly increases your risk for illness and disease. A healthy diet, regular physical exercise & weight monitoring are important for maintaining a healthy lifestyle. Congestive Heart Failure You may be retaining fluid if you have a history of heart failure or if you experience any of the following symptoms:  Weight gain of 3 pounds or more overnight or 5 pounds in a week, increased swelling in our hands or feet or shortness of breath while lying flat in bed. Please call your doctor as soon as you notice any of these symptoms; do not wait until your next office visit. Recognize signs and symptoms of STROKE: 
F - face looks uneven A - arms unable to move or move even S - speech slurred or non-existent T - time-call 911 as soon as signs and symptoms begin-DO NOT go Back to bed or wait to see if you get better-TIME IS BRAIN. Warning signs of HEART ATTACK Call 911 if you have these symptoms Chest discomfort. Most heart attacks involve discomfort in the center of the chest that lasts more than a few minutes, or that goes away and comes back. It can feel like uncomfortable pressure, squeezing, fullness, or pain. Discomfort in other areas of the upper body. Symptoms can include pain or discomfort in one or both Arms, the back, neck, jaw, or stomach. Shortness of breath with or without chest discomfort. Other signs may include breaking out in a cold sweat, nausea, or lightheadedness Don't wait more than five minutes to call 911 - MINUTES MATTER!  Fast action can save your life. Calling 911 is almost always the fastest way to get lifesaving treatment. Emergency Medical Services staff can begin treatment when they arrive - up to an hour sooner than if someone gets to the hospital by car. [facility] MEDICATION AND SIDE EFFECT GUIDE The [address] EFFECT GUIDE was provided to the PATIENT AND CARE PROVIDER. Information provided includes instruction about drug purpose and common side effects for the following medications: · Introducing [facility]! [facility] introduces [employer] patient portal. Now you can access parts of your medical record, email your doctor's office, and request medication refills online. 1. In your internet browser, go to [email or web address] 2. Click on the First Time User? Click Here link in the Sign In box. You will see the New Member Sign Up page. 3. Enter your [employer] Network Access Code exactly as it appears below. You will not need to use this code after youve completed the sign-up process. If you do not sign up before the expiration date, you must request a new code. · [employer] Access Code: [email or web address] Expires: 5/28/2018  5:58 AM 
 
4. Enter the last four digits of your Social Security Number (xxxx) and Date of Birth (mm/dd/yyyy) as indicated and click Submit. You will be taken to the next sign-up page. 5. Create a [employer] Network ID. This will be your [employer] Network login ID and cannot be changed, so think of one that is secure and easy to remember. 6. Create a [employer] password. You can change your password at any time. 7. Enter your Password Reset Question and Answer. This can be used at a later time if you forget your password. 8. Enter your e-mail address. You will receive e-mail notification when new information is available in [address] 9. Click Sign Up. You can now view and download portions of your medical record. 10. Click the Download Summary menu link to download a portable copy of your medical information. If you have questions, please visit the Frequently Asked Questions section of the MyChart website. Remember, [employer] is NOT to be used for urgent needs. For medical emergencies, dial 911. Now available from your iPhone and Android! Unresulted Labs-Please follow up with your PCP about these lab tests Order Current Status CULTURE, URINE In process CULTURE, URINE In process Providers Seen During Your Hospitalization Provider Specialty Primary office phone Brittani Ortega MD Urology [phone] Your Primary Care Physician (PCP) Primary Care Physician Office Phone Office Fax [address], [address] [phone] You are allergic to the following Allergen Reactions Hydrocodone Nausea Only Ibuprofen Unknown (comments) Pt cannot take because of having only one kidney Penicillins Rash Tolerates Cephalexin Recent Documentation OB Status Smoking Status Hysterectomy Never Smoker Emergency Contacts Name Discharge Info Relation Home Work Mobile Nicole Dangelo DISCHARGE CAREGIVER [3] Caregiver [13] [phone] [facility] DISCHARGE CAREGIVER [3] Caregiver [13] [phone] Patient Belongings The following personal items are in your possession at time of discharge: 
  Dental Appliances: Uppers, With patient (w/ pt belongings)  Visual Aid: Glasses, With patient   Hearing Aids/Status: Does not own  Home Medications: None   Jewelry: None  Clothing: Pants, Undergarments, Shirt, Footwear, Socks (placed in locker)    Other Valuables: Eyeglasses (w/ pt belongings) Discharge Instructions Attachments/References CYSTOSCOPY: POST-OP (ENGLISH) Patient Handouts Cystoscopy: What to Expect at [facility] Your Recovery A cystoscopy is a procedure that lets a doctor look inside of the bladder and the urethra. The urethra is the tube that carries urine from the bladder to outside the body. The doctor uses a thin, lighted tool called a cystoscope. Your bladder is filled with fluid. This stretches the bladder so that your doctor can look closely at the inside of your bladder. After the cystoscopy, your urethra may be sore at first, and it may burn when you urinate for the first few days after the procedure. You may feel the need to urinate more often, and your urine may be pink. These symptoms should get better in 1 or 2 days. You will probably be able to go back to most of your usual activities in 1 or 2 days. This care sheet gives you a general idea about how long it will take for you to recover. But each person recovers at a different pace. Follow the steps below to get better as quickly as possible. How can you care for yourself at home? Activity ? · Rest when you feel tired. Getting enough sleep will help you recover. ? · Try to walk each day. Start by walking a little more than you did the day before. Bit by bit, increase the amount you walk. Walking boosts blood flow and helps prevent pneumonia and constipation. ? · Avoid strenuous activities, such as bicycle riding, jogging, weight lifting, or aerobic exercise, until your doctor says it is okay. ? · Ask your doctor when you can drive again. ? · Most people are able to return to work within 1 or 2 days after the procedure. ? · You may shower and take baths as usual.  
? · Ask your doctor when it is okay for you to have sex. Diet ? · You can eat your normal diet. If your stomach is upset, try bland, low-fat foods like plain rice, broiled chicken, toast, and yogurt. ? · Drink plenty of fluids (unless your doctor tells you not to). Medicines ? · Take pain medicines exactly as directed.  
¨ If the doctor gave you a prescription medicine for pain, take it as prescribed. ¨ If you are not taking a prescription pain medicine, ask your doctor if you can take an over-the-counter medicine. ? · If you think your pain medicine is making you sick to your stomach: 
¨ Take your medicine after meals (unless your doctor has told you not to). ¨ Ask your doctor for a different pain medicine. ? · If your doctor prescribed antibiotics, take them as directed. Do not stop taking them just because you feel better. You need to take the full course of antibiotics. Follow-up care is a key part of your treatment and safety. Be sure to make and go to all appointments, and call your doctor if you are having problems. It's also a good idea to know your test results and keep a list of the medicines you take. When should you call for help? Call 911 anytime you think you may need emergency care. For example, call if: 
? · You passed out (lost consciousness). ? · You have severe trouble breathing. ? · You have sudden chest pain and shortness of breath, or you cough up blood. ? · You have severe belly pain. ?Call your doctor now or seek immediate medical care if: 
? · You are sick to your stomach or cannot keep fluids down. ? · Your urine is still red or you see blood clots after you have urinated several times. ? · You have trouble passing urine or stool, especially if you have pain or swelling in your lower belly. ? · You have signs of a blood clot, such as: 
¨ Pain in your calf, back of the knee, thigh, or groin. ¨ Redness and swelling in your leg or groin. ? · You develop a fever or severe chills. ? · You have pain in your back just below your rib cage. This is called flank pain. ? Watch closely for changes in your health, and be sure to contact your doctor if: 
? · You have pain or burning when you urinate. A burning feeling is normal for a day or two after the test, but call if it does not get better. ? · You have a frequent urge to urinate but can pass only small amounts of urine. ? · Your urine is pink, red, or cloudy, or smells bad. It is normal for the urine to have a pinkish color for a few days after the test, but call if it does not get better. Where can you learn more? Go to http://antoni-jacqui.info/. Enter S136 in the search box to learn more about \"Cystoscopy: What to Expect at Home. \" Current as of: May 12, 2017 Content Version: 11.4 © 2369-8432 Healthwise, [employer]orporated. Care instructions adapted under license by [employer] (which disclaims liability or warranty for this information). If you have questions about a medical condition or this instruction, always ask your healthcare professional. [address] any warranty or liability for your use of this information. Please provide this summary of care documentation to your next provider. Signatures-by signing, you are acknowledging that this After Visit Summary has been reviewed with you and you have received a copy. Patient Signature:  ____________________________________________________________ Date:  ____________________________________________________________  
  
Tracie Moseley Provider Signature:  ____________________________________________________________ Date:  ____________________________________________________________

## 2018-03-20 NOTE — OP NOTES
[facility] 80  OPERATIVE REPORT    Ceferino Hicks  MR#: [id number]  : 1955  ACCOUNT #: [de-identified]   DATE OF SERVICE: 2018    PREOPERATIVE DIAGNOSIS:  Solitary left kidney, obstructed left ureter. POSTOPERATIVE DIAGNOSIS:  Solitary left kidney, obstructed left ureter. PROCEDURE PERFORMED:  Cystoscopy, left retrograde pyelogram, left stent removal and replacement, urine cultures obtained from bladder and left kidney, stent change with 6 x 22 stent. SURGEON: Nam Holley MD     ASSISTANT: 0     ANESTHESIA:  General.    ESTIMATED BLOOD LOSS: 0    INDICATIONS: The patient is well known to us with the above diagnosis. 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 She has routine stent changes. Creatinine is preserved. She is on antibiotics for E. coli infection. She also has a second bacteria Enterococcus in the urine, have given her preoperative Zyvox for that. She presents for stent change. SPECIMENS REMOVED:   1. Urine culture from bladder. 2.  Urine culture from left kidney. IMPLANTS:  Left stent. COMPLICATIONS:  None. FINDINGS AT TIME OF THE PROCEDURE:  Full collecting system on the left, suggestion of stent obstruction, although creatinine is fairly preserved. DESCRIPTION OF THE PROCEDURE:  After consent was obtained, the patient was taken to the operating room. After adequate anesthesia was obtained, she was prepped and draped in lithotomy position. The rigid cystoscope was inserted in her bladder. There was a lot of cloudy urine. I have sent that for culture. I irrigated all the debris out of the bladder. The stent had some debris stuck to it, but I was able to wash it off. It was not encrusted. I then grasped the stent, pulled it out through the meatus. I cut the loop off and then passed a Bentson wire up under fluoroscopic guidance into the region of the renal pelvis.   Over that I passed the open-ended catheter, got that up to the renal pelvis. I drained out 70 mL of mostly clear urine; however, the end of it that drained out was cloudy. I sent that for culture as well. I then shot a gentle retrograde, outlining the collecting system with about 5 mL of dye. I then passed the Bentson wire back up and reinserted the scope over the wire and passed a 6 x 22 cm stent. Proximal end coiled in the upper pole caliceal infundibular system, and distal end coiled within the bladder. The remaining debris was irrigated out of the bladder. She was awakened from anesthesia and taken to recovery room in stable condition. PLAN:  She will go home later today. She will finish up her antibiotics. We will adjust antibiotics based on her cultures. 1.  For the obstructed left kidney, I would like to change her stent in about 4 months rather than waiting 6 months. Occasionally, she does get her stent completely occluded and infected, requiring urgent stent changes. 2.  Caregiver says she is having much more trouble with urinary incontinence. She has a small capacity inflamed bladder. She is on Detrol. I would like her to follow up with one of my urogynecology partners to discuss the potential for Botox injection, although I am not optimistic she would be a great candidate.       MD ANNE MARIE Ascencio / Brennan Robison  D: 03/20/2018 14:38     T: 03/20/2018 15:15  JOB #: [id number]

## 2018-03-20 NOTE — INTERVAL H&P NOTE
H&P Update: Rahul Wolf was seen and examined. History and physical has been reviewed. Significant clinical changes have occurred as noted:   On levaquin for uti    Signed By: Bethany Christian MD     March 20, 2018 1:20 PM

## 2018-03-21 ENCOUNTER — PATIENT OUTREACH (OUTPATIENT)
Dept: FAMILY MEDICINE CLINIC | Age: 63
End: 2018-03-21

## 2018-03-21 NOTE — PROGRESS NOTES
GI #2:  Call to [facility], caregiver:    Goals Addressed        General     Improved management of Incontinence (pt-stated)                  3/21/18:  Caregiver reports that patient has been wetting pads that she wears ( has been wearing for years) a lot. She became aware of the problem when she took pt to ED last month when she fell. Reports pt had several \"accidents\" while at the ED and all her clothes were wet. Reports pt's sister stated pt has been wetting in pads when they are at home together. CG not sure if \"lazy and doesn't want to get up\" or if she really can't feel it   Per urologist 3/20/18 when pt had stent replaced, bladder was very inflamed which could be a factor but he is referring to his partner, urogyn to be evaluated for botox of the bladder. Appt Dr Rodriguez Morning, 4/23/18 for this. CG working on reminding pt to try to void more frequently, NN suggests q2h, and trying to involve sister in this effort as they are together for longer periods during the day [facility] ED     Coordinate Pain Management Plan for Patient. 3/1/18:  Per caregiver, pt saw ortho, Dr Janette Marcum,  yesterday - given a sling and to wear it  most of her awake hours except for one gentle exercise recommended by Dr OQUENDO ;  Given rx for Oxycodone 5mg #40 for pain, just took the first one about an hr ago. Pain had been unrelieved by Tylenol. Caregiver does not have Oxy in pt pill boxes, she will give pt as needed and states, \"I doubt she will take more than a few. ..had it in the past and hardly used it\". Will see Dr Janette Marcum again on 3/14/18 for recheck and to determine if ready for PT.  3/21/18: Pt is no longer using pain med. Dr Janette Marcum has recommended PT./kenny       Knowledge and adherence to medication plan. 3/1/18:  Med rec completed with caregiver wo prepares pill boxes for pt. No barriers to obtaining meds and added Oxycodone to med list.  Patient is compliant with meds. Deleta Comp  3/20/18: Caregiver, Humble Slade, who prepares pill boxes, inquired if pt can take Metoprolol 25mg as whole pill at night instead of having to cut in half. States sometimes it has to be wasted, \"it's so tiny and sometimes crumbles when I cut it\"; Does not think it is scored and does use a pill cutter to split. Advised cg to ask provider at pcp appt 3/26 about this, ie: is Metoprolol ER an option? Plain metroprolol does appear to be scored in picture on GoodRx site;  Suggested also to ask pharmacy if there is another genericversion that could be used that might be \"scored\" better./kenny         Post Hospitalization     Attends follow-up appointments as directed. Post ED appts: ED 2/27 for head contusion and dislocated Rt shoulder after she tripped and fell. Per caregiver:   Pt saw Dr Doris Webb 2/28/18 and for a 2wk f/u on 3/14/18. Wearing a sling most of the day, \"doctor said she may have torn a ligament so important to rest it for now except for one gentle exercise he instructed her to do. PT in a couple of weeks. PCP appt 3/26/18 in [facility] and would like to keep this. Difficult for pt to get to other clinics due to lives in [facility]./kenny  3/21/18:  Ortho approved pt for physical therapy to right shoulder however has not started yet due to cystoscopy w/ stent replacement scheduled for this week - done 3/20/18. Per cg, they hope to begin PT next week.   PCP 3/26/18 2pm @ [facility]./kenny

## 2018-03-22 LAB
BACTERIA SPEC CULT: ABNORMAL
CC UR VC: ABNORMAL
SERVICE CMNT-IMP: ABNORMAL

## 2018-03-23 LAB
BACTERIA SPEC CULT: ABNORMAL
CC UR VC: ABNORMAL
SERVICE CMNT-IMP: ABNORMAL

## 2018-03-26 ENCOUNTER — OFFICE VISIT (OUTPATIENT)
Dept: FAMILY MEDICINE CLINIC | Age: 63
End: 2018-03-26

## 2018-03-26 VITALS
DIASTOLIC BLOOD PRESSURE: 71 MMHG | SYSTOLIC BLOOD PRESSURE: 128 MMHG | HEART RATE: 98 BPM | BODY MASS INDEX: 33.76 KG/M2 | TEMPERATURE: 98.6 F | WEIGHT: 140 LBS

## 2018-03-26 DIAGNOSIS — E78.00 HIGH CHOLESTEROL: ICD-10-CM

## 2018-03-26 DIAGNOSIS — F79 MENTAL RETARDATION: Chronic | ICD-10-CM

## 2018-03-26 DIAGNOSIS — Z13.1 SCREENING FOR DIABETES MELLITUS: Primary | ICD-10-CM

## 2018-03-26 DIAGNOSIS — E11.9 TYPE 2 DIABETES MELLITUS WITHOUT COMPLICATION, WITHOUT LONG-TERM CURRENT USE OF INSULIN (HCC): ICD-10-CM

## 2018-03-26 LAB — GLUCOSE POC: NORMAL MG/DL

## 2018-03-26 RX ORDER — GLIPIZIDE 2.5 MG/1
TABLET, EXTENDED RELEASE ORAL
Qty: 60 TAB | Refills: 5 | Status: SHIPPED | OUTPATIENT
Start: 2018-03-26 | End: 2018-03-27 | Stop reason: SDUPTHER

## 2018-03-26 RX ORDER — ATENOLOL 25 MG/1
25 TABLET ORAL DAILY
Qty: 90 TAB | Refills: 3 | Status: SHIPPED | OUTPATIENT
Start: 2018-03-26 | End: 2018-12-19 | Stop reason: SDUPTHER

## 2018-03-26 RX ORDER — SERTRALINE HYDROCHLORIDE 50 MG/1
TABLET, FILM COATED ORAL
Qty: 90 TAB | Refills: 3 | Status: SHIPPED | OUTPATIENT
Start: 2018-03-26 | End: 2019-04-22 | Stop reason: SDUPTHER

## 2018-03-26 RX ORDER — ATORVASTATIN CALCIUM 10 MG/1
TABLET, FILM COATED ORAL
Qty: 90 TAB | Refills: 1 | Status: SHIPPED | OUTPATIENT
Start: 2018-03-26 | End: 2018-10-23 | Stop reason: SDUPTHER

## 2018-03-26 NOTE — PROGRESS NOTES
Subjective:     Chief Complaint   Patient presents with    Follow-up        She  is a 59 y.o. female who presents for evaluation of DM 2, HTN and chronic DO management. Caregiver Shyann Adrian ([facility]) is present for visit and is supplementing Hx. Pt starts PT next week for her dislocated R shoulder. Had renal stent replaced last week, well tolerated. Aside from Abx, no new chronic Rx started. Fasting AM sugarsconsistently in the low 100s. Has PT through orthos office, starts next week r/t R shoulder dislocation. No low blood sugars. No reg checking of BPs. PCG is inquiring about diff options for HTN Rx r/t diff splitting 25mg metoprolol in half. No SEs noted. No other acute concerns today. ROS  Gen - no fever/chills  Resp - no dyspnea or cough  CV - no chest pain or AGUILAR  Rest per HPI    Past Medical History:   Diagnosis Date    Anemia     Chronic kidney disease     stage 3    Coronary artery disease 9/17/2014    A. Echo (9/16/14):  EF 45% with mid-distal septal/anterior HK, DD.   PASP 37.    Coronary artery disease     MI 9/2014    Depression     Diabetes ([address]     DM type 2 causing renal disease ([address]     History of nephrectomy     Right kidney    Hx MRSA infection     had negative swab [id number] but + swab on 5/16/2017, negative swab 09/06/2017    Hyperlipidemia     Hypertension     Mental retardation     Nephrolithiasis     hx    Uterine cancer ([address]     hx    UTI (lower urinary tract infection)     Vitamin D deficiency      Past Surgical History:   Procedure Laterality Date    HX CHOLECYSTECTOMY      HX DILATION AND CURETTAGE      HX HYSTERECTOMY      HX ORTHOPAEDIC      ORIF left arm, hx of fracture    HX ORTHOPAEDIC      Right ankle fx repair    HX OTHER SURGICAL  9/2014    I&D right leg wound    HX UROLOGICAL  1998    right nephrectomy    HX UROLOGICAL Left     multiple renal stents     Current Outpatient Prescriptions on File Prior to Visit Medication Sig Dispense Refill    OTHER Indications: Antibiotic started on friday 3/16/18, unsure of name      oxyCODONE IR (ROXICODONE) 5 mg immediate release tablet Take 5 mg by mouth every four (4) hours as needed for Pain (#40 RX from Ortho, Dr Azul Wheeler).  atorvastatin (LIPITOR) 10 mg tablet take 1 tablet by mouth once daily 90 Tab 1    pioglitazone (ACTOS) 30 mg tablet Take 1 Tab by mouth daily. Please provide generic only 30 Tab 11    metoprolol tartrate (LOPRESSOR) 25 mg tablet take 1/2 tablet by mouth every 12 hours 60 Tab 3    glipiZIDE SR (GLUCOTROL XL) 2.5 mg CR tablet take 1 tablet by mouth twice a day (Patient taking differently: take 1 tablet by mouth) 60 Tab 5    tolterodine ER (DETROL-LA) 2 mg ER capsule Take 1 Cap by mouth nightly. 30 Cap 2    sertraline (ZOLOFT) 50 mg tablet take 1 tablet by mouth once daily BEFORE BREAKFAST 90 Tab 3    aspirin delayed-release 81 mg tablet Take 81 mg by mouth nightly.  multivitamin with iron tablet Take 1 Tab by mouth daily.  cyanocobalamin 1,000 mcg tablet Take 1 Tab by mouth daily. For B12 180 Tab 1     No current facility-administered medications on file prior to visit. Objective:     Vitals:    03/26/18 1407   BP: 128/71   Pulse: 98   Temp: 98.6 °F (37 °C)   TempSrc: Oral   Weight: 140 lb (63.5 kg)       Physical Examination:  General appearance - alert, well appearing, and in no distress  Eyes -sclera anicteric  Neck - supple, no significant adenopathy, no thyromegaly  Chest - clear to auscultation, no wheezes, rales or rhonchi, symmetric air entry  Heart - normal rate, regular rhythm, normal S1, S2, no murmurs, rubs, clicks or gallops  Neurological - alert, oriented, no focal findings or movement disorder noted  Abdomen-BS present/WNL x 4 quads, non-tender/distended, soft,no organomegaly    Assessment/ Plan:   Diagnoses and all orders for this visit:    1.  Screening for diabetes mellitus  -     AMB POC GLUCOSE BLOOD, BY GLUCOSE MONITORING DEVICE    2. High cholesterol  -     atorvastatin (LIPITOR) 10 mg tablet; take 1 tablet by mouth once daily    3. Type 2 diabetes mellitus without complication, without long-term current use of insulin (HCC)  -     atenolol (TENORMIN) 25 mg tablet; Take 1 Tab by mouth daily.  -     glipiZIDE SR (GLUCOTROL XL) 2.5 mg CR tablet; take 1 tablet by mouth    4. Mental retardation  -     sertraline (ZOLOFT) 50 mg tablet; take 1 tablet by mouth once daily BEFORE BREAKFAST        Refill Rx. Glucoses appear in good control. [facility]. Swap metoprolol 12.5mg PO BID to atenolol 25mg daily per c/o of PCG r/t diff splitting metoprolol. Re-eval in 3-4 months. I have discussed the diagnosis with the patient and the intended plan as seen in the above orders. The patient has received an after-visit summary and questions were answered concerning future plans. I have discussed medication side effects and warnings with the patient as well. The patient verbalizes understanding and agreement with the plan.     Follow-up Disposition: Not on File

## 2018-03-26 NOTE — PATIENT INSTRUCTIONS
Atenolol (By mouth)   Atenolol (a-TEN-oh-lol)  Treats high blood pressure and chest pain. Also reduces the risk of death after a heart attack. This medicine is a beta-blocker. Brand Name(s): Tenormin   There may be other brand names for this medicine. When This Medicine Should Not Be Used: This medicine is not right for everyone. Do not use it if you had an allergic reaction to any other beta-blocker medicine or if you have certain heart problems. Ask your doctor about these heart problems. How to Use This Medicine:   Tablet  · Take your medicine as directed. Your dose may need to be changed several times to find what works best for you. · It is best to take this medicine on an empty stomach. · Store the medicine in a closed container at room temperature, away from heat, moisture, and direct light. · Missed dose: Take a dose as soon as you remember. If it is almost time for your next dose, wait until then and take a regular dose. Do not take extra medicine to make up for a missed dose. Drugs and Foods to Avoid:   Ask your doctor or pharmacist before using any other medicine, including over-the-counter medicines, vitamins, and herbal products. · Some medicines can affect how atenolol works. Tell your doctor if you are using amiodarone, clonidine, disopyramide, indomethacin, reserpine, verapamil, or diltiazem. Warnings While Using This Medicine:   · Tell your doctor if you are pregnant or breastfeeding, or if you have asthma, emphysema, bronchitis, kidney disease, pheochromocytoma, diabetes, or an overactive thyroid. Tell your doctor if you have a history of severe allergic reactions or if you are scheduled to have surgery. Tell your doctor if you have heart failure or had a heart attack. · It is not safe to take this medicine during pregnancy. It could harm an unborn baby. Tell your doctor right away if you become pregnant.   · This medicine may raise or lower your blood sugar level, and it may cover up symptoms of very low blood sugar. If you have diabetes, report any blood sugar level changes to your doctor. · This medicine may make you dizzy or drowsy. Do not drive, use machines, or do anything else that could be dangerous if you are not alert. · Do not stop using this medicine suddenly. Your doctor will need to slowly decrease your dose before you stop it completely. · Keep all medicine out of the reach of children. Never share your medicine with anyone. Possible Side Effects While Using This Medicine:   Call your doctor right away if you notice any of these side effects:  · Allergic reaction: Itching or hives, swelling in your face or hands, swelling or tingling in your mouth or throat, chest tightness, trouble breathing  · Chest pain that may spread to your arms, jaw, back, or neck, trouble breathing, nausea, unusual sweating  · Fainting or severe dizziness  · Rapid weight gain, swelling in your hands, ankles, or feet  · Shaking, trembling, sweating, hunger, confusion  · Slow, fast, or irregular heartbeat  · Unusual bleeding or bruising  · Trouble breathing, cold sweat, bluish-colored skin  If you notice these less serious side effects, talk with your doctor:   · Cold hands and feet  · Unusual tiredness or weakness  If you notice other side effects that you think are caused by this medicine, tell your doctor. Call your doctor for medical advice about side effects. You may report side effects to FDA at 1-801-FDA-8394  © 2017 [address] [employer] Information is for End User's use only and may not be sold, redistributed or otherwise used for commercial purposes. The above information is an  only. It is not intended as medical advice for individual conditions or treatments. Talk to your doctor, nurse or pharmacist before following any medical regimen to see if it is safe and effective for you.

## 2018-03-26 NOTE — PROGRESS NOTES
Results for orders placed or performed in visit on 03/26/18   AMB POC GLUCOSE BLOOD, BY GLUCOSE MONITORING DEVICE   Result Value Ref Range    Glucose  nf mg/dL     Coordination of Care  1. Have you been to the ER, urgent care clinic since your last visit? Hospitalized since your last visit? No    2. Have you seen or consulted any other health care providers outside of the [address] [place] [address] since your last visit? Include any pap smears or colon screening. No    Does the patient need refills? YES    Learning Assessment Complete?  yes

## 2018-03-27 ENCOUNTER — TELEPHONE (OUTPATIENT)
Dept: FAMILY MEDICINE CLINIC | Age: 63
End: 2018-03-27

## 2018-03-27 ENCOUNTER — DOCUMENTATION ONLY (OUTPATIENT)
Dept: FAMILY MEDICINE CLINIC | Age: 63
End: 2018-03-27

## 2018-03-27 DIAGNOSIS — E11.9 TYPE 2 DIABETES MELLITUS WITHOUT COMPLICATION, WITHOUT LONG-TERM CURRENT USE OF INSULIN (HCC): ICD-10-CM

## 2018-03-27 RX ORDER — GLIPIZIDE 2.5 MG/1
TABLET, EXTENDED RELEASE ORAL
Qty: 90 TAB | Refills: 1 | Status: SHIPPED | OUTPATIENT
Start: 2018-03-27 | End: 2018-10-01 | Stop reason: SDUPTHER

## 2018-04-20 ENCOUNTER — TELEPHONE (OUTPATIENT)
Dept: FAMILY MEDICINE CLINIC | Age: 63
End: 2018-04-20

## 2018-04-20 ENCOUNTER — DOCUMENTATION ONLY (OUTPATIENT)
Dept: FAMILY MEDICINE CLINIC | Age: 63
End: 2018-04-20

## 2018-04-20 NOTE — PROGRESS NOTES
Receipt of patient's PAP order of 1 bottle of Detrol LA, 2mg capsules, 90 capsules, has been logged into the log book. Routing to the provider for dose confirmation and permission to deliver to the patient. Nanci Gama RN    tolterodine ER (DETROL-LA) 2 mg ER capsule [[id number]]   Order Details   Dose: 2 mg Route: Oral Frequency: EVERY BEDTIME   Dispense Quantity:  30 Cap Refills:  2 Fills Remaining:  --           Sig: Take 1 Cap by mouth nightly.          Written Date:  06/30/17 Expiration Date:  --     Start Date:  06/30/17 End Date:  --            Ordering Provider:  -- NATA #:  -- NPI:  --    Authorizing Provider: Mora Everett NATA #:  [id number] NPI:  [id number]    Ordering User:   CARRIE Everett

## 2018-04-20 NOTE — TELEPHONE ENCOUNTER
Telephone call made to the patient's caregiver, Danay Casillas, to let her know that the patient's PAP Detrol LA has arrived and can be sent to the [facility] on Monday 04-23-18. Deisy Kim stated that they will  the medicine in the afternoon before 3pm. She also asked for an appointment for the patient for Monday because she thinks the patient may have Dementia and may need a Neurology referral. There are no appointments available at [facility] on 04-23-18 and it appears that we may not be going to [facility] in May because of the [facility]. Asked Deisy Kim of they would be able to come to any other clinic location and she stated \"no, because they are all too far away. \" She then stated that they can not come early on Monday to make the line because the patient has a morning appointment at [facility]. She stated that they will \"figure something out\" and that she will call back later on to schedule a follow-up appointment for the June or July [place] clinics. Routing to the provider for information and as a reminder that the Detrol will need to be approved in [facility] before we can give it to the patient on Monday. (Provider who last saw the patient id off today, but will be at the [facility] clinic on Monday. )Dani Richardson RN

## 2018-05-02 NOTE — TELEPHONE ENCOUNTER
Telephone call made to the patient's caregiver, Patricia Herringen [facility]. There was no answer at her cell number, so a detailed message was left on her name identified voicemail re: 1) the patient's PAP Detrol was delivered to the [place] clinic as they requested, but was not picked up. It is now back at the [facility] office. Due to [facility], the [facility] will not be at [place] again until June. Please call the [facility] office if the patient will need a prescription sent in [facility] [place] [facility] as we have had to do in the past.     2) The provider was made aware of her concerns about the patient possibly having dementia and suggests she make an appointment with [facility] and apply for the Care Card. The address and phone number for the [facility] were given in the message.   ([address], [place], [place], [address], [place], [place], [address], [phone]) Krunal Morales RN

## 2018-05-14 DIAGNOSIS — E11.9 TYPE 2 DIABETES MELLITUS WITHOUT COMPLICATION, WITHOUT LONG-TERM CURRENT USE OF INSULIN (HCC): ICD-10-CM

## 2018-05-14 RX ORDER — PIOGLITAZONEHYDROCHLORIDE 30 MG/1
TABLET ORAL
Qty: 30 TAB | Refills: 11 | Status: SHIPPED | OUTPATIENT
Start: 2018-05-14 | End: 2019-04-22 | Stop reason: SDUPTHER

## 2018-05-14 NOTE — TELEPHONE ENCOUNTER
This came to me, I don't see you mention this medication in your last note. Please let me know if you did not intend for this to be continued.

## 2018-05-21 ENCOUNTER — DOCUMENTATION ONLY (OUTPATIENT)
Dept: FAMILY MEDICINE CLINIC | Age: 63
End: 2018-05-21

## 2018-05-21 NOTE — PROGRESS NOTES
Patient's PAP order of Detrol LA was not picked up at [address] [place] [address] on 04-23-18. It is back at [facility] and will be sent to the June clinic in [facility]. The [facility] does not go to [facility] in May. The prescription sticker was signed on 04-23-18 by Reagan Antoine: 1 tab PO daily.  Humble Rodriguez RN

## 2018-06-25 ENCOUNTER — DOCUMENTATION ONLY (OUTPATIENT)
Dept: FAMILY MEDICINE CLINIC | Age: 63
End: 2018-06-25

## 2018-06-25 NOTE — PROGRESS NOTES
RN called Merlin Dial, Caregiver for patient, to ask if she plans to  the Detrol, PAP medication for pt. She advised that at pt's last visit to urologist, she was referred to another specialist since the Detrol was not working well. THe other specialist ordered another medication for pt which she has been taking in place of the Detrol. She will not need the Detrol any longer. This information will be forwarded to Cary Green RN, coordinator for the PAP program, and Jeremie Rojas NP. Jamila Oden said she will get the name of the new medication and call [facility] office to see if that medication can be ordered through PAP.     Roe Hair RN

## 2018-06-27 NOTE — PROGRESS NOTES
email sent to Hermes Gloria, Doc [facility], yesterday letting her know that the patient is no longer taking Detrol and at this time, does not need any medications through PAP.  Luis M Neri RN

## 2018-07-17 ENCOUNTER — HOSPITAL ENCOUNTER (OUTPATIENT)
Dept: PREADMISSION TESTING | Age: 63
Discharge: HOME | End: 2018-07-17
Payer: SELF-PAY

## 2018-07-17 VITALS
RESPIRATION RATE: 18 BRPM | HEIGHT: 56 IN | TEMPERATURE: 98.1 F | DIASTOLIC BLOOD PRESSURE: 69 MMHG | BODY MASS INDEX: 33.13 KG/M2 | SYSTOLIC BLOOD PRESSURE: 142 MMHG | WEIGHT: 147.27 LBS | OXYGEN SATURATION: 98 % | HEART RATE: 70 BPM

## 2018-07-17 LAB
ALBUMIN SERPL-MCNC: 2.8 G/DL (ref 3.5–5)
ALBUMIN/GLOB SERPL: 0.6 {RATIO} (ref 1.1–2.2)
ALP SERPL-CCNC: 158 U/L (ref 45–117)
ALT SERPL-CCNC: 16 U/L (ref 12–78)
ANION GAP SERPL CALC-SCNC: 9 MMOL/L (ref 5–15)
APPEARANCE UR: ABNORMAL
AST SERPL-CCNC: 23 U/L (ref 15–37)
BACTERIA URNS QL MICRO: ABNORMAL /HPF
BASOPHILS # BLD: 0.1 K/UL (ref 0–0.1)
BASOPHILS NFR BLD: 1 % (ref 0–1)
BILIRUB SERPL-MCNC: 0.2 MG/DL (ref 0.2–1)
BILIRUB UR QL: NEGATIVE
BUN SERPL-MCNC: 30 MG/DL (ref 6–20)
BUN/CREAT SERPL: 19 (ref 12–20)
CALCIUM SERPL-MCNC: 8.6 MG/DL (ref 8.5–10.1)
CHLORIDE SERPL-SCNC: 105 MMOL/L (ref 97–108)
CO2 SERPL-SCNC: 24 MMOL/L (ref 21–32)
COLOR UR: ABNORMAL
CREAT SERPL-MCNC: 1.57 MG/DL (ref 0.55–1.02)
DIFFERENTIAL METHOD BLD: ABNORMAL
EOSINOPHIL # BLD: 0.2 K/UL (ref 0–0.4)
EOSINOPHIL NFR BLD: 4 % (ref 0–7)
EPITH CASTS URNS QL MICRO: ABNORMAL /LPF
ERYTHROCYTE [DISTWIDTH] IN BLOOD BY AUTOMATED COUNT: 16.5 % (ref 11.5–14.5)
GLOBULIN SER CALC-MCNC: 4.7 G/DL (ref 2–4)
GLUCOSE SERPL-MCNC: 84 MG/DL (ref 65–100)
GLUCOSE UR STRIP.AUTO-MCNC: NEGATIVE MG/DL
HCT VFR BLD AUTO: 29.9 % (ref 35–47)
HGB BLD-MCNC: 8.7 G/DL (ref 11.5–16)
HGB UR QL STRIP: ABNORMAL
IMM GRANULOCYTES # BLD: 0 K/UL (ref 0–0.04)
IMM GRANULOCYTES NFR BLD AUTO: 0 % (ref 0–0.5)
KETONES UR QL STRIP.AUTO: NEGATIVE MG/DL
LEUKOCYTE ESTERASE UR QL STRIP.AUTO: ABNORMAL
LYMPHOCYTES # BLD: 1.3 K/UL (ref 0.8–3.5)
LYMPHOCYTES NFR BLD: 22 % (ref 12–49)
MCH RBC QN AUTO: 26.5 PG (ref 26–34)
MCHC RBC AUTO-ENTMCNC: 29.1 G/DL (ref 30–36.5)
MCV RBC AUTO: 91.2 FL (ref 80–99)
MONOCYTES # BLD: 0.5 K/UL (ref 0–1)
MONOCYTES NFR BLD: 9 % (ref 5–13)
NEUTS SEG # BLD: 3.7 K/UL (ref 1.8–8)
NEUTS SEG NFR BLD: 64 % (ref 32–75)
NITRITE UR QL STRIP.AUTO: POSITIVE
NRBC # BLD: 0 K/UL (ref 0–0.01)
NRBC BLD-RTO: 0 PER 100 WBC
PH UR STRIP: 6.5 [PH] (ref 5–8)
PLATELET # BLD AUTO: 167 K/UL (ref 150–400)
PMV BLD AUTO: 10.4 FL (ref 8.9–12.9)
POTASSIUM SERPL-SCNC: 5.6 MMOL/L (ref 3.5–5.1)
PROT SERPL-MCNC: 7.5 G/DL (ref 6.4–8.2)
PROT UR STRIP-MCNC: 100 MG/DL
RBC # BLD AUTO: 3.28 M/UL (ref 3.8–5.2)
RBC #/AREA URNS HPF: ABNORMAL /HPF (ref 0–5)
SODIUM SERPL-SCNC: 138 MMOL/L (ref 136–145)
SP GR UR REFRACTOMETRY: 1.01 (ref 1–1.03)
UA: UC IF INDICATED,UAUC: ABNORMAL
UROBILINOGEN UR QL STRIP.AUTO: 0.2 EU/DL (ref 0.2–1)
WBC # BLD AUTO: 5.7 K/UL (ref 3.6–11)
WBC URNS QL MICRO: >100 /HPF (ref 0–4)

## 2018-07-17 PROCEDURE — 87186 SC STD MICRODIL/AGAR DIL: CPT | Performed by: UROLOGY

## 2018-07-17 PROCEDURE — 36415 COLL VENOUS BLD VENIPUNCTURE: CPT | Performed by: UROLOGY

## 2018-07-17 PROCEDURE — 81001 URINALYSIS AUTO W/SCOPE: CPT | Performed by: UROLOGY

## 2018-07-17 PROCEDURE — 87077 CULTURE AEROBIC IDENTIFY: CPT | Performed by: UROLOGY

## 2018-07-17 PROCEDURE — 87086 URINE CULTURE/COLONY COUNT: CPT | Performed by: UROLOGY

## 2018-07-17 PROCEDURE — 85025 COMPLETE CBC W/AUTO DIFF WBC: CPT | Performed by: UROLOGY

## 2018-07-17 PROCEDURE — 80053 COMPREHEN METABOLIC PANEL: CPT | Performed by: UROLOGY

## 2018-07-17 RX ORDER — OXYBUTYNIN CHLORIDE 15 MG/1
15 TABLET, EXTENDED RELEASE ORAL
COMMUNITY
End: 2019-04-22 | Stop reason: SDUPTHER

## 2018-07-17 NOTE — PERIOP NOTES
INSTRUCTIONS [address] [address] [address] Ziegler MAIN OR [phone] MAIN PRE OP [phone] AMBULATORY PRE OP [phone] PRE-ADMISSION TESTING 21  Surgery Date:   Monday 7/23/18 Is surgery arrival time given by surgeon? NO If Lisa Saha staff will call you between 3 and 7pm the day before your surgery with your arrival time. (If your surgery is on a Monday, we will call you the Friday before.) Call [phone] after 7pm Monday-Friday if you did not receive your arrival time. Answers to Common Questions When You 
Arrive Arrive at the [address] [facility] on the day of your surgery Have your insurance card, photo ID, and any copayment (if needed) Food 
 and  
Drink NO food or drink after midnight the night before surgery This means NO water, gum, mints, coffee, juice, etc. 
No alcohol (beer, wine, liquor) 24 hours before and after surgery Medicine to TAKE Morning of Surgery MEDICATIONS TO TAKE THE MORNING OF SURGERY WITH A SIP OF WATER:  
 zoloft DO not take glipizide or actos on the morning of surgery, high protein snack (before bedtime - patient and caregiver aware) Medicine To 
STOP  
FOR PAIN 
 NO Aspirin for pain  NO Non-Steroidal Anti-Inflammatory Drugs (NSAIDs:  
for example, Ibuprofen (Advil, Motrin), Naproxen (Aleve)  STOP herbal supplements and vitamins 1 week before surgery  You can take Tylenol  follow instructions on the bottle Blood Thinners  If you take Aspirin, Plavix, Coumadin, blood-thinning or anti-clot medicine, talk to your surgeon and/or follow the instructions from the doctor who told you to take that medicine - as directed by PCP Clothing Jewelry Valuables Bathing CLOTHING 
 Wear loose, comfortable clothes  Wear glasses instead of contacts  Leave money, jewelry and valuables at home  No make-up, particularly daniel, the day of surgery  REMOVE ALL piercings, rings, and jewelry - leave at home  Wear hair loose or down; no pony-tails, buns, or metal hair clips BATHING 
 Follow all special bath instructions (for total joint replacement, spine and bowel surgeries.)  If you shower the morning of surgery, please do not apply any lotions, powders, or deodorants afterwards. Do not shave or trim anywhere 24 hours before surgery. Going Home 
or Spending the Night  SAME-DAY SURGERY: You must have a responsible adult drive you home and stay with you 24 hours after surgery  ADMITS: If your doctor is keeping you into the hospital after surgery, leave personal belongings/luggage in your car until you have a hospital room number. Hospital discharge time is 12 noon Drivers must be here before 12 noon unless you are told differently Follow all instructions so your surgery wont be cancelled. Please, be on time. If a situation occurs and you are delayed the day of surgery, call [phone] or [phone]. If your physical condition changes (like a fever, cold, flu, etc.) call your surgeon as soon as possible. The Preadmission Testing staff can be reached at [phone]. OTHER SPECIAL INSTRUCTIONS:  Free  parking 7am-5pm 
 
The patient and caregiver was contacted  in person. She  verbalize  understanding of all instructions and does not  need reinforcement.

## 2018-07-17 NOTE — H&P
PAT Pre-Op History & Physical 
 
Patient: Lucie Sierra                  MRN: [id number]          SSN: [id number] YOB: 1955          Age: 59 y.o. Sex: female Subjective:  
Patient is a 59 y.o.  female who presents with history of of left hydronephrosis that has been attributed to ureteral narrowing secondary to radiation therapy for cervical cancer. Has solitary left kidney due to right nephrectomy in 1998. Requires periodic cystoscopies and stent changes due to the hydronephrosis- last done 3/20/2018. The patient was evaluated in the surgeon's office and it was determined that the most appropriate plan of care is to proceed with surgical intervention. Patient's PCP CARRIE Giordano Patient's long time caregiver [facility]) is present and helps answer questions as needed. Past Medical History:  
Diagnosis Date  Anemia  Chronic kidney disease   
 stage 3  Coronary artery disease 9/17/2014 A. Echo (9/16/14):  EF 45% with mid-distal septal/anterior HK, DD. PASP 37.  
 Coronary artery disease MI 9/2014  Depression  Diabetes ([address]  DM type 2 causing renal disease ([address]  History of nephrectomy Right kidney  Hx MRSA infection   
 had negative swab [id number] but + swab on 5/16/2017, negative swab 09/06/2017and 3/14/2018  Hyperlipidemia  Hypertension  Mental retardation  Nephrolithiasis   
 hx  Urge incontinence  Uterine cancer ([address]  UTI (lower urinary tract infection) chronic  Vitamin D deficiency Past Surgical History:  
Procedure Laterality Date  HX CHOLECYSTECTOMY  HX DILATION AND CURETTAGE    
 HX HYSTERECTOMY  HX ORTHOPAEDIC    
 ORIF left arm, hx of fracture  HX ORTHOPAEDIC Right ankle fx repair  HX OTHER SURGICAL  9/2014 I&D right leg wound [address] [facility]  
 right nephrectomy  HX UROLOGICAL Left   
 multiple renal stents Prior to Admission medications Medication Sig Start Date End Date Taking? Authorizing Provider  
cyanocobalamin, vitamin B-12, (VITAMIN B-12 PO) Take 1 Tab by mouth every morning. Yes Historical Provider  
oxybutynin chloride XL (DITROPAN XL) 15 mg CR tablet Take 15 mg by mouth nightly. Yes Historical Provider  
glipiZIDE SR (GLUCOTROL XL) 2.5 mg CR tablet take 1 tablet by mouth once daily before meals. Patient taking differently: 2.5 mg every morning. take 1 tablet by mouth once daily before meals. 3/27/18  Yes Kannan Pryor NP  
atenolol (TENORMIN) 25 mg tablet Take 1 Tab by mouth daily. Patient taking differently: Take 25 mg by mouth nightly. 3/26/18  Yes Kannan Pryor NP  
atorvastatin (LIPITOR) 10 mg tablet take 1 tablet by mouth once daily Patient taking differently: 10 mg every morning. take 1 tablet by mouth once daily 3/26/18  Yes Kannan Pryor NP  
sertraline (ZOLOFT) 50 mg tablet take 1 tablet by mouth once daily BEFORE BREAKFAST 3/26/18  Yes Kannan Pryor NP  
aspirin delayed-release 81 mg tablet Take 81 mg by mouth nightly. Yes Historical Provider  
pioglitazone (ACTOS) 30 mg tablet take 1 tablet by mouth once daily 5/14/18   CARRIE Hair Current Outpatient Prescriptions Medication Sig  cyanocobalamin, vitamin B-12, (VITAMIN B-12 PO) Take 1 Tab by mouth every morning.  oxybutynin chloride XL (DITROPAN XL) 15 mg CR tablet Take 15 mg by mouth nightly.  glipiZIDE SR (GLUCOTROL XL) 2.5 mg CR tablet take 1 tablet by mouth once daily before meals. (Patient taking differently: 2.5 mg every morning. take 1 tablet by mouth once daily before meals.)  atenolol (TENORMIN) 25 mg tablet Take 1 Tab by mouth daily. (Patient taking differently: Take 25 mg by mouth nightly.)  atorvastatin (LIPITOR) 10 mg tablet take 1 tablet by mouth once daily (Patient taking differently: 10 mg every morning. take 1 tablet by mouth once daily)  sertraline (ZOLOFT) 50 mg tablet take 1 tablet by mouth once daily BEFORE BREAKFAST  aspirin delayed-release 81 mg tablet Take 81 mg by mouth nightly.  pioglitazone (ACTOS) 30 mg tablet take 1 tablet by mouth once daily No current facility-administered medications for this encounter. Allergies Allergen Reactions  Hydrocodone Nausea Only  Ibuprofen Unknown (comments) Pt cannot take because of having only one kidney  Penicillins Rash Tolerates Cephalexin Social History Substance Use Topics  Smoking status: Never Smoker  Smokeless tobacco: Never Used  Alcohol use No  
  
History Drug Use No  
 
Family History Problem Relation Age of Onset  Mental Retardation Mother  Heart Disease Mother  No Known Problems Sister  Breast Cancer Sister  No Known Problems Brother  Malignant Hyperthermia Neg Hx  Pseudocholinesterase Deficiency Neg Hx  Delayed Awakening Neg Hx  Post-op Nausea/Vomiting Neg Hx  Emergence Delirium Neg Hx  Post-op Cognitive Dysfunction Neg Hx Review of Systems Patient denies difficulty swallowing, mouth sores, or loose teeth. Patient denies any recent dental procedures or any planned prior to surgery. Patient denies chest pain, tightness, pain radiating down left arm, palpitations. Denies dizziness, visual disturbances, or lightheadedness. Patient denies shortness of breath at rest, wheezing, cough, fever, or chills. Patient denies diarrhea, constipation, or abdominal pain. Patient denies urinary problems including dysuria, hesitancy, urgency, or incontinence. Denies skin breakdown, rashes, insect bites or open area. Does have large bruise on left elblow/upper arm. Objective:  
Patient Vitals for the past 24 hrs: 
 Temp Pulse Resp BP SpO2  
18 0947 98.1 °F (36.7 °C) 70 18 142/69 98 % Temp (24hrs), Av.1 °F (36.7 °C), Min:98.1 °F (36.7 °C), Max:98.1 °F (36.7 °C) Body mass index is 33.02 kg/(m^2). Wt Readings from Last 1 Encounters:  
07/17/18 66.8 kg (147 lb 4.3 oz) Physical Exam: 
 
 General: Pleasant,  cooperative, no apparent distress, appears stated age. Eyes: Conjunctivae/corneas clear. EOMs intact. Nose: Nares normal. 
 Mouth/Throat: Lips, mucosa, and tongue normal. No lower teeth- top plate dentures in place. Neck: Supple, symmetrical, trachea midline. Back: Symmetric Lungs: Clear to auscultation bilaterally. Heart: Regular rate and rhythm, S1, S2 normal. No murmur, click, rub or gallop. Abdomen: Soft, non-tender. Bowel sounds normal. No distention. Musculoskeletal:  Unremarkable. Extremities:  Extremities normal, atraumatic, no cyanosis or edema. Calves 
                               supple, non tender to palpation. Pulses: 2+ and symmetric bilateral upper extremities. Cap. refill <2 seconds Skin: Skin color, texture, turgor normal.  No rashes or lesions. Large bruise left elbow area- skin intact. Neurologic: CN II-XII grossly intact. Alert and oriented x3. Labs:  
Recent Results (from the past 72 hour(s)) CBC WITH AUTOMATED DIFF Collection Time: 07/17/18 10:27 AM  
Result Value Ref Range WBC 5.7 3.6 - 11.0 K/uL  
 RBC 3.28 (L) 3.80 - 5.20 M/uL HGB 8.7 (L) 11.5 - 16.0 g/dL HCT 29.9 (L) 35.0 - 47.0 % MCV 91.2 80.0 - 99.0 FL  
 MCH 26.5 26.0 - 34.0 PG  
 MCHC 29.1 (L) 30.0 - 36.5 g/dL  
 RDW 16.5 (H) 11.5 - 14.5 % PLATELET 897 353 - 575 K/uL MPV 10.4 8.9 - 12.9 FL  
 NRBC 0.0 0  WBC ABSOLUTE NRBC 0.00 0.00 - 0.01 K/uL NEUTROPHILS 64 32 - 75 % LYMPHOCYTES 22 12 - 49 % MONOCYTES 9 5 - 13 % EOSINOPHILS 4 0 - 7 % BASOPHILS 1 0 - 1 % IMMATURE GRANULOCYTES 0 0.0 - 0.5 % ABS. NEUTROPHILS 3.7 1.8 - 8.0 K/UL  
 ABS. LYMPHOCYTES 1.3 0.8 - 3.5 K/UL  
 ABS. MONOCYTES 0.5 0.0 - 1.0 K/UL  
 ABS. EOSINOPHILS 0.2 0.0 - 0.4 K/UL  
 ABS. BASOPHILS 0.1 0.0 - 0.1 K/UL  
 ABS. IMM.  GRANS. 0.0 0.00 - 0.04 K/UL  
 DF AUTOMATED METABOLIC PANEL, COMPREHENSIVE Collection Time: 07/17/18 10:27 AM  
Result Value Ref Range Sodium 138 136 - 145 mmol/L Potassium 5.6 (H) 3.5 - 5.1 mmol/L Chloride 105 97 - 108 mmol/L  
 CO2 24 21 - 32 mmol/L Anion gap 9 5 - 15 mmol/L Glucose 84 65 - 100 mg/dL BUN 30 (H) 6 - 20 MG/DL Creatinine 1.57 (H) 0.55 - 1.02 MG/DL  
 BUN/Creatinine ratio 19 12 - 20 GFR est AA 40 (L) >60 ml/min/1.73m2 GFR est non-AA 33 (L) >60 ml/min/1.73m2 Calcium 8.6 8.5 - 10.1 MG/DL Bilirubin, total 0.2 0.2 - 1.0 MG/DL  
 ALT (SGPT) 16 12 - 78 U/L  
 AST (SGOT) 23 15 - 37 U/L Alk. phosphatase 158 (H) 45 - 117 U/L Protein, total 7.5 6.4 - 8.2 g/dL Albumin 2.8 (L) 3.5 - 5.0 g/dL Globulin 4.7 (H) 2.0 - 4.0 g/dL A-G Ratio 0.6 (L) 1.1 - 2.2 URINALYSIS W/ REFLEX CULTURE Collection Time: 07/17/18 10:27 AM  
Result Value Ref Range Color YELLOW/STRAW Appearance TURBID (A) CLEAR Specific gravity 1.010 1.003 - 1.030    
 pH (UA) 6.5 5.0 - 8.0 Protein 100 (A) NEG mg/dL Glucose NEGATIVE  NEG mg/dL Ketone NEGATIVE  NEG mg/dL Bilirubin NEGATIVE  NEG Blood MODERATE (A) NEG Urobilinogen 0.2 0.2 - 1.0 EU/dL Nitrites POSITIVE (A) NEG Leukocyte Esterase LARGE (A) NEG    
 WBC >100 (H) 0 - 4 /hpf  
 RBC 5-10 0 - 5 /hpf Epithelial cells FEW FEW /lpf Bacteria 1+ (A) NEG /hpf  
 UA:UC IF INDICATED URINE CULTURE ORDERED (A) CNI Assessment:  
 
Hydronephrosis Plan:  
 
Scheduled for cystoscopy left ureteral stent exchange. Labs done per surgeon's orders. Results reviewed- multiple abnormal results on CMP(creatinine elevated, GFR decreased, potassium elevated) and CBC( H/H low). Also UA triggered C&S.(pending). Also MRSA pending. EKG done 3/14/2018 reviewed in [facility]- unremarkable. No change in cardiac status per patient report.  
 
History of + MRSA swab in 5/2017- was treated with Mupirocin ointment at that time and has 2 negative swabs since that time. MRSA nasal swab collected and sent to lab.  
 
 
 
 
 
 
Jordan Toney NP

## 2018-07-18 LAB
BACTERIA SPEC CULT: ABNORMAL
BACTERIA SPEC CULT: ABNORMAL
SERVICE CMNT-IMP: ABNORMAL

## 2018-07-18 RX ORDER — MUPIROCIN 20 MG/G
OINTMENT TOPICAL 2 TIMES DAILY
Qty: 22 G | Refills: 0 | Status: SHIPPED | OUTPATIENT
Start: 2018-07-18 | End: 2018-07-23

## 2018-07-18 NOTE — PERIOP NOTES
Notification of Positive MRSA and Treatment Ordered by Preadmission Testing Nurse Practitioner      Patient Name:  Lucie Sierra  MRN: [id number]  : 1955    Surgeon: Dr. Ermelinda Wing  Date of surgery:2018  Procedure: Cystoscopy with left ureteral stent change    Allergies: Allergies   Allergen Reactions    Hydrocodone Nausea Only    Ibuprofen Unknown (comments)     Pt cannot take because of having only one kidney    Penicillins Rash     Tolerates Cephalexin        MRSA results: All Micro Results     Procedure Component Value Units Date/Time         MRSA CULTURE NARES [[id number]]  (Abnormal) Collected:  18 1013    Order Status:  Completed Specimen:  Nares Updated:  18     Special Requests: NO SPECIAL REQUESTS        Culture result: MRSA PRESENT (A)                      Treatment ordered: Bactroban ointment 2%- apply intranasal twice daily for 5 days    Date patient notified of results / treatment prescribed:Spoke with caregiver Myra Scott 2018 at [phone].     The patient's caregiver was instructed to add medication and date they began treatment to their \"Prior to Admission Medication\" list given to them in [address] NP

## 2018-07-20 ENCOUNTER — ANESTHESIA EVENT (OUTPATIENT)
Dept: SURGERY | Age: 63
End: 2018-07-20
Payer: SELF-PAY

## 2018-07-20 NOTE — PERIOP NOTES
Patient with + urine culture. >100,000 colonies of e-coli. Faxed the urine culture with sensitivity, along with a urine treatment plan to Dr. Anna Mayberry office marked \"Urgent. \"  Spoke with Delphia Habermann from Dr. Anna Mayberry office informing her of the above and requesting that Dr. Yoel Hogue review the culture and treat the patient since surgery is this Monday, 7/23/2018. Multiple attempts to fax the urine culture results to Dr. Anna camacho have been unsuccessful. Received a call from Magdalena Kim at Dr. Anna camacho informing me that they have reviewed the urine culture results and the patient is being treated with Bactrim.   DOS: 7/23/2018

## 2018-07-23 ENCOUNTER — HOSPITAL ENCOUNTER (OUTPATIENT)
Dept: GENERAL RADIOLOGY | Age: 63
Discharge: HOME | End: 2018-07-23
Attending: UROLOGY
Payer: SELF-PAY

## 2018-07-23 ENCOUNTER — ANESTHESIA (OUTPATIENT)
Dept: SURGERY | Age: 63
End: 2018-07-23
Payer: SELF-PAY

## 2018-07-23 ENCOUNTER — HOSPITAL ENCOUNTER (OUTPATIENT)
Age: 63
Setting detail: OUTPATIENT SURGERY
Discharge: HOME | End: 2018-07-23
Attending: UROLOGY | Admitting: UROLOGY
Payer: SELF-PAY

## 2018-07-23 VITALS
HEART RATE: 90 BPM | HEIGHT: 56 IN | RESPIRATION RATE: 16 BRPM | BODY MASS INDEX: 33.07 KG/M2 | TEMPERATURE: 97.9 F | WEIGHT: 147 LBS | SYSTOLIC BLOOD PRESSURE: 113 MMHG | DIASTOLIC BLOOD PRESSURE: 79 MMHG | OXYGEN SATURATION: 98 %

## 2018-07-23 DIAGNOSIS — Z46.6 ENCOUNTER FOR FITTING OF URETERAL STENT: ICD-10-CM

## 2018-07-23 LAB
GLUCOSE BLD STRIP.AUTO-MCNC: 93 MG/DL (ref 65–100)
GLUCOSE BLD STRIP.AUTO-MCNC: 99 MG/DL (ref 65–100)
SERVICE CMNT-IMP: NORMAL
SERVICE CMNT-IMP: NORMAL

## 2018-07-23 PROCEDURE — 76010000138 HC OR TIME 0.5 TO 1 HR: Performed by: UROLOGY

## 2018-07-23 PROCEDURE — 74011250636 HC RX REV CODE- 250/636: Performed by: UROLOGY

## 2018-07-23 PROCEDURE — 77030020143 HC AIRWY LARYN INTUB CGAS -A: Performed by: ANESTHESIOLOGY

## 2018-07-23 PROCEDURE — 76210000006 HC OR PH I REC 0.5 TO 1 HR: Performed by: UROLOGY

## 2018-07-23 PROCEDURE — 77030020782 HC GWN BAIR PAWS FLX 3M -B

## 2018-07-23 PROCEDURE — 74011250636 HC RX REV CODE- 250/636

## 2018-07-23 PROCEDURE — 76210000020 HC REC RM PH II FIRST 0.5 HR: Performed by: UROLOGY

## 2018-07-23 PROCEDURE — 74011636320 HC RX REV CODE- 636/320: Performed by: UROLOGY

## 2018-07-23 PROCEDURE — C2617 STENT, NON-COR, TEM W/O DEL: HCPCS | Performed by: UROLOGY

## 2018-07-23 PROCEDURE — 74011250636 HC RX REV CODE- 250/636: Performed by: ANESTHESIOLOGY

## 2018-07-23 PROCEDURE — 74011000250 HC RX REV CODE- 250: Performed by: UROLOGY

## 2018-07-23 PROCEDURE — C1769 GUIDE WIRE: HCPCS | Performed by: UROLOGY

## 2018-07-23 PROCEDURE — 74420 UROGRAPHY RTRGR +-KUB: CPT

## 2018-07-23 PROCEDURE — 77030018832 HC SOL IRR H20 ICUM -A: Performed by: UROLOGY

## 2018-07-23 PROCEDURE — 74011000250 HC RX REV CODE- 250

## 2018-07-23 PROCEDURE — 77030019927 HC TBNG IRR CYSTO BAXT -A: Performed by: UROLOGY

## 2018-07-23 PROCEDURE — 76060000032 HC ANESTHESIA 0.5 TO 1 HR: Performed by: UROLOGY

## 2018-07-23 PROCEDURE — C1758 CATHETER, URETERAL: HCPCS | Performed by: UROLOGY

## 2018-07-23 PROCEDURE — 82962 GLUCOSE BLOOD TEST: CPT

## 2018-07-23 DEVICE — URETERAL STENT
Type: IMPLANTABLE DEVICE | Site: URETER | Status: FUNCTIONAL
Brand: CONTOUR™

## 2018-07-23 RX ORDER — LIDOCAINE HYDROCHLORIDE 10 MG/ML
0.1 INJECTION, SOLUTION EPIDURAL; INFILTRATION; INTRACAUDAL; PERINEURAL AS NEEDED
Status: DISCONTINUED | OUTPATIENT
Start: 2018-07-23 | End: 2018-07-23 | Stop reason: HOSPADM

## 2018-07-23 RX ORDER — SODIUM CHLORIDE 0.9 % (FLUSH) 0.9 %
5-10 SYRINGE (ML) INJECTION AS NEEDED
Status: DISCONTINUED | OUTPATIENT
Start: 2018-07-23 | End: 2018-07-23 | Stop reason: HOSPADM

## 2018-07-23 RX ORDER — CEFAZOLIN SODIUM/WATER 2 G/20 ML
2 SYRINGE (ML) INTRAVENOUS ONCE
Status: COMPLETED | OUTPATIENT
Start: 2018-07-23 | End: 2018-07-23

## 2018-07-23 RX ORDER — SODIUM CHLORIDE, SODIUM LACTATE, POTASSIUM CHLORIDE, CALCIUM CHLORIDE 600; 310; 30; 20 MG/100ML; MG/100ML; MG/100ML; MG/100ML
125 INJECTION, SOLUTION INTRAVENOUS CONTINUOUS
Status: DISCONTINUED | OUTPATIENT
Start: 2018-07-23 | End: 2018-07-23 | Stop reason: HOSPADM

## 2018-07-23 RX ORDER — SODIUM CHLORIDE 0.9 % (FLUSH) 0.9 %
5-10 SYRINGE (ML) INJECTION EVERY 8 HOURS
Status: DISCONTINUED | OUTPATIENT
Start: 2018-07-23 | End: 2018-07-23 | Stop reason: HOSPADM

## 2018-07-23 RX ORDER — ONDANSETRON 2 MG/ML
4 INJECTION INTRAMUSCULAR; INTRAVENOUS AS NEEDED
Status: DISCONTINUED | OUTPATIENT
Start: 2018-07-23 | End: 2018-07-23 | Stop reason: HOSPADM

## 2018-07-23 RX ORDER — SODIUM CHLORIDE, SODIUM LACTATE, POTASSIUM CHLORIDE, CALCIUM CHLORIDE 600; 310; 30; 20 MG/100ML; MG/100ML; MG/100ML; MG/100ML
100 INJECTION, SOLUTION INTRAVENOUS CONTINUOUS
Status: DISCONTINUED | OUTPATIENT
Start: 2018-07-23 | End: 2018-07-23 | Stop reason: HOSPADM

## 2018-07-23 RX ORDER — MIDAZOLAM HYDROCHLORIDE 1 MG/ML
INJECTION, SOLUTION INTRAMUSCULAR; INTRAVENOUS AS NEEDED
Status: DISCONTINUED | OUTPATIENT
Start: 2018-07-23 | End: 2018-07-23 | Stop reason: HOSPADM

## 2018-07-23 RX ORDER — HYDROMORPHONE HYDROCHLORIDE 2 MG/ML
.25-1 INJECTION, SOLUTION INTRAMUSCULAR; INTRAVENOUS; SUBCUTANEOUS
Status: DISCONTINUED | OUTPATIENT
Start: 2018-07-23 | End: 2018-07-23 | Stop reason: HOSPADM

## 2018-07-23 RX ORDER — PROPOFOL 10 MG/ML
INJECTION, EMULSION INTRAVENOUS AS NEEDED
Status: DISCONTINUED | OUTPATIENT
Start: 2018-07-23 | End: 2018-07-23 | Stop reason: HOSPADM

## 2018-07-23 RX ORDER — FENTANYL CITRATE 50 UG/ML
INJECTION, SOLUTION INTRAMUSCULAR; INTRAVENOUS AS NEEDED
Status: DISCONTINUED | OUTPATIENT
Start: 2018-07-23 | End: 2018-07-23 | Stop reason: HOSPADM

## 2018-07-23 RX ORDER — SULFAMETHOXAZOLE AND TRIMETHOPRIM 400; 80 MG/1; MG/1
1 TABLET ORAL 2 TIMES DAILY
COMMUNITY
Start: 2018-07-20 | End: 2018-07-30

## 2018-07-23 RX ORDER — EPHEDRINE SULFATE 50 MG/ML
INJECTION, SOLUTION INTRAVENOUS AS NEEDED
Status: DISCONTINUED | OUTPATIENT
Start: 2018-07-23 | End: 2018-07-23 | Stop reason: HOSPADM

## 2018-07-23 RX ORDER — DIPHENHYDRAMINE HYDROCHLORIDE 50 MG/ML
12.5 INJECTION, SOLUTION INTRAMUSCULAR; INTRAVENOUS AS NEEDED
Status: DISCONTINUED | OUTPATIENT
Start: 2018-07-23 | End: 2018-07-23 | Stop reason: HOSPADM

## 2018-07-23 RX ORDER — LIDOCAINE HCL/PF 100 MG/5ML
SYRINGE (ML) INTRAVENOUS AS NEEDED
Status: DISCONTINUED | OUTPATIENT
Start: 2018-07-23 | End: 2018-07-23 | Stop reason: HOSPADM

## 2018-07-23 RX ORDER — LIDOCAINE HYDROCHLORIDE 20 MG/ML
INJECTION, SOLUTION EPIDURAL; INFILTRATION; INTRACAUDAL; PERINEURAL AS NEEDED
Status: DISCONTINUED | OUTPATIENT
Start: 2018-07-23 | End: 2018-07-23 | Stop reason: HOSPADM

## 2018-07-23 RX ADMIN — EPHEDRINE SULFATE 10 MG: 50 INJECTION, SOLUTION INTRAVENOUS at 10:43

## 2018-07-23 RX ADMIN — EPHEDRINE SULFATE 10 MG: 50 INJECTION, SOLUTION INTRAVENOUS at 10:32

## 2018-07-23 RX ADMIN — FENTANYL CITRATE 25 MCG: 50 INJECTION, SOLUTION INTRAMUSCULAR; INTRAVENOUS at 10:46

## 2018-07-23 RX ADMIN — Medication 2 G: at 10:36

## 2018-07-23 RX ADMIN — MIDAZOLAM HYDROCHLORIDE 2 MG: 1 INJECTION, SOLUTION INTRAMUSCULAR; INTRAVENOUS at 10:18

## 2018-07-23 RX ADMIN — LIDOCAINE HYDROCHLORIDE 40 MG: 20 INJECTION, SOLUTION EPIDURAL; INFILTRATION; INTRACAUDAL; PERINEURAL at 10:28

## 2018-07-23 RX ADMIN — SODIUM CHLORIDE, SODIUM LACTATE, POTASSIUM CHLORIDE, AND CALCIUM CHLORIDE 100 ML/HR: 600; 310; 30; 20 INJECTION, SOLUTION INTRAVENOUS at 10:00

## 2018-07-23 RX ADMIN — PROPOFOL 100 MG: 10 INJECTION, EMULSION INTRAVENOUS at 10:28

## 2018-07-23 RX ADMIN — FENTANYL CITRATE 50 MCG: 50 INJECTION, SOLUTION INTRAMUSCULAR; INTRAVENOUS at 10:18

## 2018-07-23 RX ADMIN — FENTANYL CITRATE 25 MCG: 50 INJECTION, SOLUTION INTRAMUSCULAR; INTRAVENOUS at 10:48

## 2018-07-23 NOTE — IP AVS SNAPSHOT
[address] [facility], [place], operated by [facility] 
 
 
 [address] [facility] 
[phone] Patient: Kam Rosario MRN: [id number] About your hospitalization You were admitted on:  July 23, 2018 You last received care in the:  [facility] PACU You were discharged on:  July 23, 2018 Why you were hospitalized Your primary diagnosis was:  Not on File Follow-up Information Follow up With Details Comments Contact Info Nilsa [address], [place]   [address] 
[phone] Discharge Orders None A check grabiel indicates which time of day the medication should be taken. My Medications CHANGE how you take these medications Instructions Each Dose to Equal  
 Morning Noon Evening Bedtime  
 atenolol 25 mg tablet Commonly known as:  TENORMIN What changed:  when to take this Your last dose was: Your next dose is: Take 1 Tab by mouth daily. 25 mg  
    
   
   
   
  
 atorvastatin 10 mg tablet Commonly known as:  LIPITOR What changed:   
- how much to take - when to take this 
- additional instructions Your last dose was: Your next dose is:    
   
   
 take 1 tablet by mouth once daily  
     
   
   
   
  
 glipiZIDE SR 2.5 mg CR tablet Commonly known as:  GLUCOTROL XL What changed:   
- how much to take - when to take this 
- additional instructions Your last dose was: Your next dose is:    
   
   
 take 1 tablet by mouth once daily before meals. CONTINUE taking these medications Instructions Each Dose to Equal  
 Morning Noon Evening Bedtime  
 aspirin delayed-release 81 mg tablet Your last dose was: Your next dose is: Take 81 mg by mouth nightly. 81 mg  
    
   
   
   
  
 BACTRIM  mg per tablet Generic drug:  trimethoprim-sulfamethoxazole Your last dose was: Your next dose is: Take 1 Tab by mouth two (2) times a day. Indications: BACTERIAL URINARY TRACT INFECTION  
 1 Tab  
    
   
   
   
  
 mupirocin 2 % ointment Commonly known as:  [facility] Your last dose was: Your next dose is:    
   
   
 by Both Nostrils route two (2) times a day for 5 days. Apply pea size amount to Q tip and insert into edge of nostril- repeat other side. oxybutynin chloride XL 15 mg CR tablet Commonly known as:  DITROPAN XL Your last dose was: Your next dose is: Take 15 mg by mouth nightly. 15 mg  
    
   
   
   
  
 pioglitazone 30 mg tablet Commonly known as:  ACTOS Your last dose was: Your next dose is:    
   
   
 take 1 tablet by mouth once daily  
     
   
   
   
  
 sertraline 50 mg tablet Commonly known as:  ZOLOFT Your last dose was: Your next dose is:    
   
   
 take 1 tablet by mouth once daily BEFORE BREAKFAST  
     
   
   
   
  
 VITAMIN B-12 PO Your last dose was: Your next dose is: Take 1 Tab by mouth every morning. 1 Tab Discharge Instructions DISCHARGE SUMMARY from your Nurse The following personal items collected during your admission are returned to you:  
Dental Appliance: Dental Appliances: Uppers, With patient Vision: Visual Aid: Glasses, With patient Hearing Aid:   
Jewelry: Jewelry: None Clothing: Clothing: Footwear, Pants, Shirt, Socks, Undergarments, With patient Other Valuables: Other Valuables: Purse (with caregiver) Valuables sent to safe:   
 
 
PATIENT INSTRUCTIONS: 
 
After general anesthesia or intravenous sedation, for 24 hours or while taking prescription Narcotics: · Limit your activities · Do not drive and operate hazardous machinery · Do not make important personal or business decisions · Do  not drink alcoholic beverages · If you have not urinated within 8 hours after discharge, please contact your surgeon on call. Report the following to your surgeon: 
· Excessive pain, swelling, redness or odor of or around the surgical area · Temperature over 100.5 · Nausea and vomiting lasting longer than 4 hours or if unable to take medications · Any signs of decreased circulation or nerve impairment to extremity: change in color, persistent  numbness, tingling, coldness or increase pain · Any questions INCENTIVE SPIROMETER Your nurse may send an incentive spirometer home with you to help you with your breathing. The incentive spirometer provides another way to make the lungs work better. DIRECTIONS: 
1. Exhale - begin with empty lungs. 2. Place lips around the mouthpiece; take a SLOW and DEEP breath in. 
3. Keep the small blue \"float\" on the RIGHT between the top and bottom arrows. If you suck the small blue float all the way to the top, YOU ARE CHEATING. SLOW DOWN when you breathe in. 
4. Your nurse will help you decide your target level for the big blue \"float\" that rises. Usually, that number is over the 1500 level. Your goal number is based upon your height and age, giving an estimate goal for your lung volume. 5. The arrow on the left side marks your goal.  Always try to go past your goal. Raise the arrow when you make a new goal.   
6. When you take a full deep breath in, hold it for 2 seconds. Exhale normally. Don't be afraid to push yourself; rest a little between each attempt. Use the Incentive spirometer 10 times every hour while awake. It is OK to break the 10 to 12 attempts into smaller numbers if this exercise makes your tired. For example, perform 2 times every 10 minutes. IF YOU HAVE BEEN DIAGNOSED WITH SLEEP APNEA, PLEASE USE YOUR SLEEP APNEA DEVICE OR CPAP MACHINE WHEN YOU INTEND TO NAP AFTER TAKING PAIN MEDICATION. Ankle Pumps Ankle pumps increase the circulation of oxygenated blood to your lower extremities and decrease your risk for circulation problems such as blood clots. They also stretch the muscles, tendons and ligaments in your foot and ankle, and prevent joint contracture in the ankle and foot, especially after surgeries on the legs. It is important to do ankle pump exercises regularly after surgery because immobility increases your risk for developing a blood clot. Your doctor may also have you take an Aspirin for the next few days as well. If your doctor did not ask you to take an Aspirin, consult with him before starting Aspirin therapy on your own. The exercise is quite simple. 6. Slowly point your foot forward, feeling the muscles on the top of your lower leg stretch, and hold this position for 5 seconds. 7. Next, pull your foot back toward you as far as possible, stretching the calf muscles, and hold that position for 5 seconds. 8. Repeat with the other foot. 9. Perform 10 repetitions every hour while awake for both ankles if possible (down and then up with the foot once is one repetition). You should feel gentle stretching of the muscles in your lower leg when doing this exercise. If you feel pain, or your range of motion is limited, don't  Push too hard. Only go the limit your joint and muscles will let you go. If you have increasing pain, progressively worsening leg warmth or swelling, STOP the exercise and call your doctor. Other information in your discharge envelope: 
[]     PRESCRIPTIONS []     PHYSICAL THERAPY PRESCRIPTION 
[]     APPOINTMENT CARDS []     Regional Anesthesia Pamphlet for block or block with On-Q Catheter from Anesthesia Service []     Medical device information sheets/pamphlets from their   
[]     School/work excuse note. []     /parent work excuse note. These are general instructions for a healthy lifestyle: *  Please give a list of your current medications to your Primary Care Provider. *  Please update this list whenever your medications are discontinued, doses are 
    changed, or new medications (including over-the-counter products) are added. *  Please carry medication information at all times in case of emergency situations. No smoking / No tobacco products / Avoid exposure to second hand smoke Surgeon General's Warning:  Quitting smoking now greatly reduces serious risk to your health. Obesity, smoking, and sedentary lifestyle greatly increases your risk for illness and disease. A healthy diet, regular physical exercise & weight monitoring are important for maintaining a healthy lifestyle. Congestive Heart Failure You may be retaining fluid if you have a history of heart failure or if you experience any of the following symptoms:  Weight gain of 3 pounds or more overnight or 5 pounds in a week, increased swelling in our hands or feet or shortness of breath while lying flat in bed. Please call your doctor as soon as you notice any of these symptoms; do not wait until your next office visit. Recognize signs and symptoms of STROKE: 
F - face looks uneven A - arms unable to move or move even S - speech slurred or non-existent T - time-call 911 as soon as signs and symptoms begin-DO NOT go Back to bed or wait to see if you get better-TIME IS BRAIN. Warning signs of Heart Attack Call 911 if you have these symptoms: · Chest discomfort. Most heart attacks involve discomfort in the center of the chest that lasts more than a few minutes, or that goes away and comes back. It can feel like uncomfortable pressure, squeezing, fullness, or pain. · Discomfort in other areas of the upper body. Symptoms can include pain or discomfort in one or both Arms, the back, neck, jaw, or stomach. ·  Shortness of breath with or without chest discomfort. · Other signs may include breaking out in a cold sweat, nausea, or lightheadedness Don't wait more than five minutes to call 911 - MINUTES MATTER! Fast action can save your life. Calling 911 is almost always the fastest way to get lifesaving treatment. Emergency Medical Services staff can begin treatment when they arrive - up to an hour sooner than if someone gets to the hospital by car. [facility] [facility] MEDICATION AND SIDE EFFECTS GUIDE The [address] [facility] EFFECT GUIDE was provided to the PATIENT AND CARE PROVIDER. Information provided includes instruction about drug purpose and common side effects for the following medications: · Introducing [facility]! Beverley Balbuena introduces Fanear patient portal. Now you can access parts of your medical record, email your doctor's office, and request medication refills online. 1. In your internet browser, go to [email or web address] 2. Click on the First Time User? Click Here link in the Sign In box. You will see the New Member Sign Up page. 3. Enter your Fanear Access Code exactly as it appears below. You will not need to use this code after youve completed the sign-up process. If you do not sign up before the expiration date, you must request a new code. · Fanear Access Code: [email or web address] Expires: 10/14/2018  5:47 PM 
 
4. Enter the last four digits of your Social Security Number (xxxx) and Date of Birth (mm/dd/yyyy) as indicated and click Submit. You will be taken to the next sign-up page. 5. Create a Putneyt ID. This will be your Fanear login ID and cannot be changed, so think of one that is secure and easy to remember. 6. Create a Fanear password. You can change your password at any time. 7. Enter your Password Reset Question and Answer. This can be used at a later time if you forget your password. 8. Enter your e-mail address. You will receive e-mail notification when new information is available in [address] 9. Click Sign Up. You can now view and download portions of your medical record. 10. Click the Download Summary menu link to download a portable copy of your medical information. If you have questions, please visit the Frequently Asked Questions section of the [employer] website. Remember, [employer] is NOT to be used for urgent needs. For medical emergencies, dial 911. Now available from your iPhone and Android! Introducing Hayder Wright As a [employer] patient, I wanted to make you aware of our electronic visit tool called Hayder Wright. [employer] 24/7 allows you to connect within minutes with a medical provider 24 hours a day, seven days a week via a mobile device or tablet or logging into a secure website from your computer. You can access Hayder Wright from anywhere in the [place]. A virtual visit might be right for you when you have a simple condition and feel like you just dont want to get out of bed, or cant get away from work for an appointment, when your regular [employer] provider is not available (evenings, weekends or holidays), or when youre out of town and need minor care. Electronic visits cost only $49 and if the [employer] 24/7 provider determines a prescription is needed to treat your condition, one can be electronically transmitted to a nearby pharmacy*. Please take a moment to enroll today if you have not already done so. The enrollment process is free and takes just a few minutes. To enroll, please download the [employer] 24/7 vane to your tablet or phone, or visit [email or web address] to enroll on your computer.    
And, as an [address] [place] [address] patient with a [employer] account, the results of your visits will be scanned into your electronic medical record and your primary care provider will be able to view the scanned results. We urge you to continue to see your regular [facility] provider for your ongoing medical care. And while your primary care provider may not be the one available when you seek a Riiid virtual visit, the peace of mind you get from getting a real diagnosis real time can be priceless. For more information on Riiid, view our Frequently Asked Questions (FAQs) at [email or web address]. Sincerely, 
 
Patricia Newton MD 
Chief Medical Officer [address] *:  certain medications cannot be prescribed via Riiid Providers Seen During Your Hospitalization Provider Specialty Primary office phone Violet Love MD Urology [phone] Your Primary Care Physician (PCP) Primary Care Physician Office Phone Office Fax [address], [phone] You are allergic to the following Allergen Reactions Hydrocodone Nausea Only Ibuprofen Unknown (comments) Pt cannot take because of having only one kidney Penicillins Rash Tolerates Cephalexin Recent Documentation Height Weight BMI OB Status Smoking Status 1.422 m 66.7 kg 32.96 kg/m2 Hysterectomy Never Smoker Emergency Contacts Name Discharge Info Relation Home Work Mobile [address] [facility] DISCHARGE CAREGIVER [3] Caregiver [phone] [facility]) DISCHARGE CAREGIVER [3] Caregiver [[phone] Patient Belongings The following personal items are in your possession at time of discharge: 
  Dental Appliances: Uppers, With patient  Visual Aid: Glasses, With patient      Home Medications: None   Jewelry: None  Clothing: Footwear, Pants, Shirt, Socks, Undergarments, With patient    Other Valuables: Purse (with caregiver) Discharge Instructions Attachments/References URETERAL STENT PLACEMENT : POST-OP (ENGLISH) CYSTOSCOPY: POST-OP (ENGLISH) Patient Handouts Ureteral Stent Placement: What to Expect at Home Your Recovery A ureteral (say \"you-REE-ter-ul\") stent is a thin, hollow tube that is placed in the ureter to help urine pass from the kidney into the bladder. Ureters are the tubes that connect the kidneys to the bladder. You may have a small amount of blood in your urine for 1 to 3 days after the procedure. While the stent is in place, you may have to urinate more often, feel a sudden need to urinate, or feel like you cannot completely empty your bladder. You may feel some pain when you urinate or do strenuous activity. You also may notice a small amount of blood in your urine after strenuous activities. These side effects usually do not prevent people from doing their normal daily activities. You may have a thin string coming out of your urethra. Your urethra is the tube that carries urine from your bladder to outside your body. This string is attached to the stent. Try not to pull on the string. The doctor will use the string to pull out the stent when you no longer need it. After the procedure, urine may flow better from your kidneys to your bladder. A ureteral stent may be left in place for several days or for as long as several months. Your doctor will take it out when you no longer need it. This care sheet gives you a general idea about how long it will take for you to recover. But each person recovers at a different pace. Follow the steps below to get better as quickly as possible. How can you care for yourself at home? Activity 
  · Rest when you feel tired. Getting enough sleep will help you recover.  
  · Avoid strenuous activities, such as bicycle riding, jogging, weight lifting, or aerobic exercise, until your doctor says it is okay.  
  · Ask your doctor when you can drive again.  
  · Most people are able to return to work the day after the procedure.  If your work requires intense activity, you may feel pain in your kidney area or get tired easily. If this happens, you may need to do less strenuous activities while the stent is in.  
  · Ask your doctor when it is okay for you to have sex. Diet 
  · You can eat your normal diet. If your stomach is upset, try bland, low-fat foods like plain rice, broiled chicken, toast, and yogurt.  
  · Drink plenty of fluids (unless your doctor tells you not to). Medicines 
  · Your doctor will tell you if and when you can restart your medicines. He or she will also give you instructions about taking any new medicines.  
  · If you take blood thinners, such as warfarin (Coumadin), clopidogrel (Plavix), or aspirin, be sure to talk to your doctor. He or she will tell you if and when to start taking those medicines again. Make sure that you understand exactly what your doctor wants you to do.  
  · Be safe with medicines. Take pain medicines exactly as directed. ¨ If the doctor gave you a prescription medicine for pain, take it as prescribed. ¨ If you are not taking a prescription pain medicine, ask your doctor if you can take an over-the-counter medicine.  
  · If you think your pain medicine is making you sick to your stomach: 
¨ Take your medicine after meals (unless your doctor has told you not to). ¨ Ask your doctor for a different pain medicine.  
  · If your doctor prescribed antibiotics, take them as directed. Do not stop taking them just because you feel better. You need to take the full course of antibiotics. Follow-up care is a key part of your treatment and safety. Be sure to make and go to all appointments, and call your doctor if you are having problems. It's also a good idea to know your test results and keep a list of the medicines you take. When should you call for help? Call 911 anytime you think you may need emergency care. For example, call if: 
  · You passed out (lost consciousness).   · You have severe trouble breathing.  
  · You have sudden chest pain and shortness of breath, or you cough up blood.  
  · You have severe belly pain.  
 Call your doctor now or seek immediate medical care if: 
  · Part or all of the stent comes out of your urethra.  
  · You have pain that does not get better after you take pain medicine.  
  · You have symptoms of a urinary infection. For example: ¨ You have blood or pus in your urine. ¨ You have pain in your back just below your rib cage. This is called flank pain. ¨ You have a fever, chills, or body aches. ¨ It hurts to urinate. ¨ You have groin or belly pain.  
  · You cannot control when you urinate, or you leak urine.  
 Watch closely for changes in your health, and be sure to contact your doctor if you have any problems. Where can you learn more? Go to http://antoni-jacqui.info/. Enter Y708 in the search box to learn more about \"Ureteral Stent Placement: What to Expect at Home. \" Current as of: May 12, 2017 Content Version: 11.7 © 5054-5315 [employer]. Care instructions adapted under license by [employer] (which disclaims liability or warranty for this information). If you have questions about a medical condition or this instruction, always ask your healthcare professional. [address] any warranty or liability for your use of this information. Cystoscopy: What to Expect at [facility] Your Recovery A cystoscopy is a procedure that lets a doctor look inside of the bladder and the urethra. The urethra is the tube that carries urine from the bladder to outside the body. The doctor uses a thin, lighted tool called a cystoscope. Your bladder is filled with fluid. This stretches the bladder so that your doctor can look closely at the inside of your bladder.  
After the cystoscopy, your urethra may be sore at first, and it may burn when you urinate for the first few days after the procedure. You may feel the need to urinate more often, and your urine may be pink. These symptoms should get better in 1 or 2 days. You will probably be able to go back to most of your usual activities in 1 or 2 days. This care sheet gives you a general idea about how long it will take for you to recover. But each person recovers at a different pace. Follow the steps below to get better as quickly as possible. How can you care for yourself at home? Activity 
  · Rest when you feel tired. Getting enough sleep will help you recover.  
  · Try to walk each day. Start by walking a little more than you did the day before. Bit by bit, increase the amount you walk. Walking boosts blood flow and helps prevent pneumonia and constipation.  
  · Avoid strenuous activities, such as bicycle riding, jogging, weight lifting, or aerobic exercise, until your doctor says it is okay.  
  · Ask your doctor when you can drive again.  
  · Most people are able to return to work within 1 or 2 days after the procedure.  
  · You may shower and take baths as usual.  
  · Ask your doctor when it is okay for you to have sex. Diet 
  · You can eat your normal diet. If your stomach is upset, try bland, low-fat foods like plain rice, broiled chicken, toast, and yogurt.  
  · Drink plenty of fluids (unless your doctor tells you not to). Medicines 
  · Take pain medicines exactly as directed. ¨ If the doctor gave you a prescription medicine for pain, take it as prescribed. ¨ If you are not taking a prescription pain medicine, ask your doctor if you can take an over-the-counter medicine.  
  · If you think your pain medicine is making you sick to your stomach: 
¨ Take your medicine after meals (unless your doctor has told you not to). ¨ Ask your doctor for a different pain medicine.  
  · If your doctor prescribed antibiotics, take them as directed.  Do not stop taking them just because you feel better. You need to take the full course of antibiotics. Follow-up care is a key part of your treatment and safety. Be sure to make and go to all appointments, and call your doctor if you are having problems. It's also a good idea to know your test results and keep a list of the medicines you take. When should you call for help? Call 911 anytime you think you may need emergency care. For example, call if: 
  · You passed out (lost consciousness).  
  · You have severe trouble breathing.  
  · You have sudden chest pain and shortness of breath, or you cough up blood.  
  · You have severe belly pain.  
 Call your doctor now or seek immediate medical care if: 
  · You are sick to your stomach or cannot keep fluids down.  
  · Your urine is still red or you see blood clots after you have urinated several times.  
  · You have trouble passing urine or stool, especially if you have pain or swelling in your lower belly.  
  · You have signs of a blood clot, such as: 
¨ Pain in your calf, back of the knee, thigh, or groin. ¨ Redness and swelling in your leg or groin.  
  · You develop a fever or severe chills.  
  · You have pain in your back just below your rib cage. This is called flank pain.  
 Watch closely for changes in your health, and be sure to contact your doctor if: 
  · You have pain or burning when you urinate. A burning feeling is normal for a day or two after the test, but call if it does not get better.  
  · You have a frequent urge to urinate but can pass only small amounts of urine.  
  · Your urine is pink, red, or cloudy, or smells bad. It is normal for the urine to have a pinkish color for a few days after the test, but call if it does not get better. Where can you learn more? Go to http://antoni-jacqui.info/. Enter F812 in the search box to learn more about \"Cystoscopy: What to Expect at Home. \" Current as of: May 12, 2017 Content Version: 11.7 © 2971-3975 [facility]. Care instructions adapted under license by [employer] (which disclaims liability or warranty for this information). If you have questions about a medical condition or this instruction, always ask your healthcare professional. [address] any warranty or liability for your use of this information. Please provide this summary of care documentation to your next provider. Signatures-by signing, you are acknowledging that this After Visit Summary has been reviewed with you and you have received a copy. Patient Signature:  ____________________________________________________________ Date:  ____________________________________________________________  
  
Medardo Sport Provider Signature:  ____________________________________________________________ Date:  ____________________________________________________________

## 2018-07-23 NOTE — DISCHARGE INSTRUCTIONS
DISCHARGE SUMMARY from your Nurse    The following personal items collected during your admission are returned to you:   Dental Appliance: Dental Appliances: Uppers, With patient  Vision: Visual Aid: Glasses, With patient  Hearing Aid:    Jewelry: Jewelry: None  Clothing: Clothing: Footwear, Pants, Shirt, Socks, Undergarments, With patient  Other Valuables: Other Valuables: Purse (with caregiver)  Valuables sent to safe:        PATIENT INSTRUCTIONS:    After general anesthesia or intravenous sedation, for 24 hours or while taking prescription Narcotics:  · Limit your activities  · Do not drive and operate hazardous machinery  · Do not make important personal or business decisions  · Do  not drink alcoholic beverages  · If you have not urinated within 8 hours after discharge, please contact your surgeon on call. Report the following to your surgeon:  · Excessive pain, swelling, redness or odor of or around the surgical area  · Temperature over 100.5  · Nausea and vomiting lasting longer than 4 hours or if unable to take medications  · Any signs of decreased circulation or nerve impairment to extremity: change in color, persistent  numbness, tingling, coldness or increase pain  · Any questions    INCENTIVE SPIROMETER  Your nurse may send an incentive spirometer home with you to help you with your breathing. The incentive spirometer provides another way to make the lungs work better. DIRECTIONS:  1. Exhale - begin with empty lungs. 2. Place lips around the mouthpiece; take a SLOW and DEEP breath in.  3. Keep the small blue \"float\" on the RIGHT between the top and bottom arrows. If you suck the small blue float all the way to the top, YOU ARE CHEATING. SLOW DOWN when you breathe in.  4. Your nurse will help you decide your target level for the big blue \"float\" that rises. Usually, that number is over the 1500 level.   Your goal number is based upon your height and age, giving an estimate goal for your lung volume. 5. The arrow on the left side marks your goal.  Always try to go past your goal. Raise the arrow when you make a new goal.    6. When you take a full deep breath in, hold it for 2 seconds. Exhale normally. Don't be afraid to push yourself; rest a little between each attempt. Use the Incentive spirometer 10 times every hour while awake. It is OK to break the 10 to 12 attempts into smaller numbers if this exercise makes your tired. For example, perform 2 times every 10 minutes. IF YOU HAVE BEEN DIAGNOSED WITH SLEEP APNEA, PLEASE USE YOUR SLEEP APNEA DEVICE OR CPAP MACHINE WHEN YOU INTEND TO NAP AFTER TAKING PAIN MEDICATION. Ankle Pumps    Ankle pumps increase the circulation of oxygenated blood to your lower extremities and decrease your risk for circulation problems such as blood clots. They also stretch the muscles, tendons and ligaments in your foot and ankle, and prevent joint contracture in the ankle and foot, especially after surgeries on the legs. It is important to do ankle pump exercises regularly after surgery because immobility increases your risk for developing a blood clot. Your doctor may also have you take an Aspirin for the next few days as well. If your doctor did not ask you to take an Aspirin, consult with him before starting Aspirin therapy on your own. The exercise is quite simple. 6. Slowly point your foot forward, feeling the muscles on the top of your lower leg stretch, and hold this position for 5 seconds. 7. Next, pull your foot back toward you as far as possible, stretching the calf muscles, and hold that position for 5 seconds. 8. Repeat with the other foot. 9. Perform 10 repetitions every hour while awake for both ankles if possible (down and then up with the foot once is one repetition). You should feel gentle stretching of the muscles in your lower leg when doing this exercise.   If you feel pain, or your range of motion is limited, don't  Push too hard. Only go the limit your joint and muscles will let you go. If you have increasing pain, progressively worsening leg warmth or swelling, STOP the exercise and call your doctor. Other information in your discharge envelope:  []     PRESCRIPTIONS  []     PHYSICAL THERAPY PRESCRIPTION  []     APPOINTMENT CARDS  []     Regional Anesthesia Pamphlet for block or block with On-Q Catheter from Anesthesia Service  []     Medical device information sheets/pamphlets from their    []     School/work excuse note. []     /parent work excuse note. These are general instructions for a healthy lifestyle:    *  Please give a list of your current medications to your Primary Care Provider. *  Please update this list whenever your medications are discontinued, doses are      changed, or new medications (including over-the-counter products) are added. *  Please carry medication information at all times in case of emergency situations. No smoking / No tobacco products / Avoid exposure to second hand smoke    Surgeon General's Warning:  Quitting smoking now greatly reduces serious risk to your health. Obesity, smoking, and sedentary lifestyle greatly increases your risk for illness and disease. A healthy diet, regular physical exercise & weight monitoring are important for maintaining a healthy lifestyle. Congestive Heart Failure  You may be retaining fluid if you have a history of heart failure or if you experience any of the following symptoms:  Weight gain of 3 pounds or more overnight or 5 pounds in a week, increased swelling in our hands or feet or shortness of breath while lying flat in bed. Please call your doctor as soon as you notice any of these symptoms; do not wait until your next office visit.     Recognize signs and symptoms of STROKE:  F - face looks uneven  A - arms unable to move or move even  S - speech slurred or non-existent  T - time-call 911 as soon as signs and symptoms begin-DO NOT go         Back to bed or wait to see if you get better-TIME IS BRAIN. Warning signs of Heart Attack                 Call 911 if you have these symptoms:    · Chest discomfort. Most heart attacks involve discomfort in the center of the chest that lasts more than a few minutes, or that goes away and comes back. It can feel like uncomfortable pressure, squeezing, fullness, or pain. · Discomfort in other areas of the upper body. Symptoms can include pain or discomfort in one or both        Arms, the back, neck, jaw, or stomach. ·  Shortness of breath with or without chest discomfort. · Other signs may include breaking out in a cold sweat, nausea, or lightheadedness    Don't wait more than five minutes to call 911 - MINUTES MATTER! Fast action can save your life. Calling 911 is almost always the fastest way to get lifesaving treatment. Emergency Medical Services staff can begin treatment when they arrive - up to an hour sooner than if someone gets to the hospital by car. [facility] MEDICATION AND SIDE EFFECTS GUIDE    The [facility] MEDICATION AND SIDE EFFECT GUIDE was provided to the PATIENT AND CARE PROVIDER. Information provided includes instruction about drug purpose and common side effects for the following medications: Follow-up with Dr. Autumn David in 4-6 months. Please call the office to schedule that appointment.

## 2018-07-23 NOTE — ANESTHESIA POSTPROCEDURE EVALUATION
Post-Anesthesia Evaluation and Assessment    Patient: Paloma Velasquez MRN: [id number]  SSN: [id number]    YOB: 1955  Age: 59 y.o. Sex: female       Cardiovascular Function/Vital Signs  Visit Vitals    /79    Pulse 90    Temp 36.6 °C (97.9 °F)    Resp 16    Ht 4' 8\" (1.422 m)    Wt 66.7 kg (147 lb)    SpO2 98%    BMI 32.96 kg/m2       Patient is status post general anesthesia for Procedure(s):  CYSTOSCOPY LEFT STENT CHANGE. Nausea/Vomiting: None    Postoperative hydration reviewed and adequate. Pain:  Pain Scale 1: Numeric (0 - 10) (07/23/18 1136)  Pain Intensity 1: 0 (07/23/18 1136)   Managed    Neurological Status:   Neuro (WDL): Exceptions to WDL (07/23/18 1136)  Neuro  Neurologic State: Drowsy; Pharmacologically induced (comment) (07/23/18 1136)  LUE Motor Response: Purposeful (07/23/18 1136)  LLE Motor Response: Purposeful (07/23/18 1136)  RUE Motor Response: Purposeful (07/23/18 1136)  RLE Motor Response: Purposeful (07/23/18 1136)   At baseline    Mental Status and Level of Consciousness: Arousable    Pulmonary Status:   O2 Device: Room air (07/23/18 1136)   Adequate oxygenation and airway patent    Complications related to anesthesia: None    Post-anesthesia assessment completed.  No concerns    Signed By: Brittany Morocho MD     July 23, 2018

## 2018-07-23 NOTE — H&P (VIEW-ONLY)
PAT Pre-Op History & Physical 
 
Patient: Rian Gregorio                  MRN: [id number]          SSN: [id number] YOB: 1955          Age: 59 y.o. Sex: female Subjective:  
Patient is a 59 y.o.  female who presents with history of of left hydronephrosis that has been attributed to ureteral narrowing secondary to radiation therapy for cervical cancer. Has solitary left kidney due to right nephrectomy in 1998. Requires periodic cystoscopies and stent changes due to the hydronephrosis- last done 3/20/2018. The patient was evaluated in the surgeon's office and it was determined that the most appropriate plan of care is to proceed with surgical intervention. Patient's PCP CARRIE Jurado Patient's long time caregiver [facility]) is present and helps answer questions as needed. Past Medical History:  
Diagnosis Date  Anemia  Chronic kidney disease   
 stage 3  Coronary artery disease 9/17/2014 A. Echo (9/16/14):  EF 45% with mid-distal septal/anterior HK, DD. PASP 37.  
 Coronary artery disease MI 9/2014  Depression  Diabetes ([address]  DM type 2 causing renal disease ([address]  History of nephrectomy Right kidney  Hx MRSA infection   
 had negative swab [id number] but + swab on 5/16/2017, negative swab 09/06/2017and 3/14/2018  Hyperlipidemia  Hypertension  Mental retardation  Nephrolithiasis   
 hx  Urge incontinence  Uterine cancer ([address]  UTI (lower urinary tract infection) chronic  Vitamin D deficiency Past Surgical History:  
Procedure Laterality Date  HX CHOLECYSTECTOMY  HX DILATION AND CURETTAGE    
 HX HYSTERECTOMY  HX ORTHOPAEDIC    
 ORIF left arm, hx of fracture  HX ORTHOPAEDIC Right ankle fx repair  HX OTHER SURGICAL  9/2014 I&D right leg wound [address]  
 right nephrectomy  HX UROLOGICAL Left   
 multiple renal stents Prior to Admission medications Medication Sig Start Date End Date Taking? Authorizing Provider  
cyanocobalamin, vitamin B-12, (VITAMIN B-12 PO) Take 1 Tab by mouth every morning. Yes Historical Provider  
oxybutynin chloride XL (DITROPAN XL) 15 mg CR tablet Take 15 mg by mouth nightly. Yes Historical Provider  
glipiZIDE SR (GLUCOTROL XL) 2.5 mg CR tablet take 1 tablet by mouth once daily before meals. Patient taking differently: 2.5 mg every morning. take 1 tablet by mouth once daily before meals. 3/27/18  Yes Martita Johnson NP  
atenolol (TENORMIN) 25 mg tablet Take 1 Tab by mouth daily. Patient taking differently: Take 25 mg by mouth nightly. 3/26/18  Yes Martita Johnson NP  
atorvastatin (LIPITOR) 10 mg tablet take 1 tablet by mouth once daily Patient taking differently: 10 mg every morning. take 1 tablet by mouth once daily 3/26/18  Yes Martita Johnson NP  
sertraline (ZOLOFT) 50 mg tablet take 1 tablet by mouth once daily BEFORE BREAKFAST 3/26/18  Yes Martita Johnson NP  
aspirin delayed-release 81 mg tablet Take 81 mg by mouth nightly. Yes Historical Provider  
pioglitazone (ACTOS) 30 mg tablet take 1 tablet by mouth once daily 5/14/18   CARRIE Dobson Current Outpatient Prescriptions Medication Sig  cyanocobalamin, vitamin B-12, (VITAMIN B-12 PO) Take 1 Tab by mouth every morning.  oxybutynin chloride XL (DITROPAN XL) 15 mg CR tablet Take 15 mg by mouth nightly.  glipiZIDE SR (GLUCOTROL XL) 2.5 mg CR tablet take 1 tablet by mouth once daily before meals. (Patient taking differently: 2.5 mg every morning. take 1 tablet by mouth once daily before meals.)  atenolol (TENORMIN) 25 mg tablet Take 1 Tab by mouth daily. (Patient taking differently: Take 25 mg by mouth nightly.)  atorvastatin (LIPITOR) 10 mg tablet take 1 tablet by mouth once daily (Patient taking differently: 10 mg every morning. take 1 tablet by mouth once daily)  sertraline (ZOLOFT) 50 mg tablet take 1 tablet by mouth once daily BEFORE BREAKFAST  aspirin delayed-release 81 mg tablet Take 81 mg by mouth nightly.  pioglitazone (ACTOS) 30 mg tablet take 1 tablet by mouth once daily No current facility-administered medications for this encounter. Allergies Allergen Reactions  Hydrocodone Nausea Only  Ibuprofen Unknown (comments) Pt cannot take because of having only one kidney  Penicillins Rash Tolerates Cephalexin Social History Substance Use Topics  Smoking status: Never Smoker  Smokeless tobacco: Never Used  Alcohol use No  
  
History Drug Use No  
 
Family History Problem Relation Age of Onset  Mental Retardation Mother  Heart Disease Mother  No Known Problems Sister  Breast Cancer Sister  No Known Problems Brother  Malignant Hyperthermia Neg Hx  Pseudocholinesterase Deficiency Neg Hx  Delayed Awakening Neg Hx  Post-op Nausea/Vomiting Neg Hx  Emergence Delirium Neg Hx  Post-op Cognitive Dysfunction Neg Hx Review of Systems Patient denies difficulty swallowing, mouth sores, or loose teeth. Patient denies any recent dental procedures or any planned prior to surgery. Patient denies chest pain, tightness, pain radiating down left arm, palpitations. Denies dizziness, visual disturbances, or lightheadedness. Patient denies shortness of breath at rest, wheezing, cough, fever, or chills. Patient denies diarrhea, constipation, or abdominal pain. Patient denies urinary problems including dysuria, hesitancy, urgency, or incontinence. Denies skin breakdown, rashes, insect bites or open area. Does have large bruise on left elblow/upper arm. Objective:  
Patient Vitals for the past 24 hrs: 
 Temp Pulse Resp BP SpO2  
18 0947 98.1 °F (36.7 °C) 70 18 142/69 98 % Temp (24hrs), Av.1 °F (36.7 °C), Min:98.1 °F (36.7 °C), Max:98.1 °F (36.7 °C) Body mass index is 33.02 kg/(m^2). Wt Readings from Last 1 Encounters:  
07/17/18 66.8 kg (147 lb 4.3 oz) Physical Exam: 
 
 General: Pleasant,  cooperative, no apparent distress, appears stated age. Eyes: Conjunctivae/corneas clear. EOMs intact. Nose: Nares normal. 
 Mouth/Throat: Lips, mucosa, and tongue normal. No lower teeth- top plate dentures in place. Neck: Supple, symmetrical, trachea midline. Back: Symmetric Lungs: Clear to auscultation bilaterally. Heart: Regular rate and rhythm, S1, S2 normal. No murmur, click, rub or gallop. Abdomen: Soft, non-tender. Bowel sounds normal. No distention. Musculoskeletal:  Unremarkable. Extremities:  Extremities normal, atraumatic, no cyanosis or edema. Calves 
                               supple, non tender to palpation. Pulses: 2+ and symmetric bilateral upper extremities. Cap. refill <2 seconds Skin: Skin color, texture, turgor normal.  No rashes or lesions. Large bruise left elbow area- skin intact. Neurologic: CN II-XII grossly intact. Alert and oriented x3. Labs:  
Recent Results (from the past 72 hour(s)) CBC WITH AUTOMATED DIFF Collection Time: 07/17/18 10:27 AM  
Result Value Ref Range WBC 5.7 3.6 - 11.0 K/uL  
 RBC 3.28 (L) 3.80 - 5.20 M/uL HGB 8.7 (L) 11.5 - 16.0 g/dL HCT 29.9 (L) 35.0 - 47.0 % MCV 91.2 80.0 - 99.0 FL  
 MCH 26.5 26.0 - 34.0 PG  
 MCHC 29.1 (L) 30.0 - 36.5 g/dL  
 RDW 16.5 (H) 11.5 - 14.5 % PLATELET 523 218 - 860 K/uL MPV 10.4 8.9 - 12.9 FL  
 NRBC 0.0 0  WBC ABSOLUTE NRBC 0.00 0.00 - 0.01 K/uL NEUTROPHILS 64 32 - 75 % LYMPHOCYTES 22 12 - 49 % MONOCYTES 9 5 - 13 % EOSINOPHILS 4 0 - 7 % BASOPHILS 1 0 - 1 % IMMATURE GRANULOCYTES 0 0.0 - 0.5 % ABS. NEUTROPHILS 3.7 1.8 - 8.0 K/UL  
 ABS. LYMPHOCYTES 1.3 0.8 - 3.5 K/UL  
 ABS. MONOCYTES 0.5 0.0 - 1.0 K/UL  
 ABS. EOSINOPHILS 0.2 0.0 - 0.4 K/UL  
 ABS. BASOPHILS 0.1 0.0 - 0.1 K/UL  
 ABS. IMM.  GRANS. 0.0 0.00 - 0.04 K/UL  
 DF AUTOMATED METABOLIC PANEL, COMPREHENSIVE Collection Time: 07/17/18 10:27 AM  
Result Value Ref Range Sodium 138 136 - 145 mmol/L Potassium 5.6 (H) 3.5 - 5.1 mmol/L Chloride 105 97 - 108 mmol/L  
 CO2 24 21 - 32 mmol/L Anion gap 9 5 - 15 mmol/L Glucose 84 65 - 100 mg/dL BUN 30 (H) 6 - 20 MG/DL Creatinine 1.57 (H) 0.55 - 1.02 MG/DL  
 BUN/Creatinine ratio 19 12 - 20 GFR est AA 40 (L) >60 ml/min/1.73m2 GFR est non-AA 33 (L) >60 ml/min/1.73m2 Calcium 8.6 8.5 - 10.1 MG/DL Bilirubin, total 0.2 0.2 - 1.0 MG/DL  
 ALT (SGPT) 16 12 - 78 U/L  
 AST (SGOT) 23 15 - 37 U/L Alk. phosphatase 158 (H) 45 - 117 U/L Protein, total 7.5 6.4 - 8.2 g/dL Albumin 2.8 (L) 3.5 - 5.0 g/dL Globulin 4.7 (H) 2.0 - 4.0 g/dL A-G Ratio 0.6 (L) 1.1 - 2.2 URINALYSIS W/ REFLEX CULTURE Collection Time: 07/17/18 10:27 AM  
Result Value Ref Range Color YELLOW/STRAW Appearance TURBID (A) CLEAR Specific gravity 1.010 1.003 - 1.030    
 pH (UA) 6.5 5.0 - 8.0 Protein 100 (A) NEG mg/dL Glucose NEGATIVE  NEG mg/dL Ketone NEGATIVE  NEG mg/dL Bilirubin NEGATIVE  NEG Blood MODERATE (A) NEG Urobilinogen 0.2 0.2 - 1.0 EU/dL Nitrites POSITIVE (A) NEG Leukocyte Esterase LARGE (A) NEG    
 WBC >100 (H) 0 - 4 /hpf  
 RBC 5-10 0 - 5 /hpf Epithelial cells FEW FEW /lpf Bacteria 1+ (A) NEG /hpf  
 UA:UC IF INDICATED URINE CULTURE ORDERED (A) CNI Assessment:  
 
Hydronephrosis Plan:  
 
Scheduled for cystoscopy left ureteral stent exchange. Labs done per surgeon's orders. Results reviewed- multiple abnormal results on CMP(creatinine elevated, GFR decreased, potassium elevated) and CBC( H/H low). Also UA triggered C&S.(pending). Also MRSA pending. EKG done 3/14/2018 reviewed in [facility]- unremarkable. No change in cardiac status per patient report.  
 
History of + MRSA swab in 5/2017- was treated with Mupirocin ointment at that time and has 2 negative swabs since that time. MRSA nasal swab collected and sent to lab.  
 
 
 
 
 
 
Juanito Bryan NP

## 2018-07-23 NOTE — IP AVS SNAPSHOT
[facility] 
 
 
 [address] 
[phone] Patient: Keri Thompson MRN: [id number] VR A check grabiel indicates which time of day the medication should be taken. My Medications CHANGE how you take these medications Instructions Each Dose to Equal  
 Morning Noon Evening Bedtime  
 atenolol 25 mg tablet Commonly known as:  TENORMIN What changed:  when to take this Your last dose was: Your next dose is: Take 1 Tab by mouth daily. 25 mg  
    
   
   
   
  
 atorvastatin 10 mg tablet Commonly known as:  LIPITOR What changed:   
- how much to take - when to take this 
- additional instructions Your last dose was: Your next dose is:    
   
   
 take 1 tablet by mouth once daily  
     
   
   
   
  
 glipiZIDE SR 2.5 mg CR tablet Commonly known as:  GLUCOTROL XL What changed:   
- how much to take - when to take this 
- additional instructions Your last dose was: Your next dose is:    
   
   
 take 1 tablet by mouth once daily before meals. CONTINUE taking these medications Instructions Each Dose to Equal  
 Morning Noon Evening Bedtime  
 aspirin delayed-release 81 mg tablet Your last dose was: Your next dose is: Take 81 mg by mouth nightly. 81 mg  
    
   
   
   
  
 BACTRIM  mg per tablet Generic drug:  trimethoprim-sulfamethoxazole Your last dose was: Your next dose is: Take 1 Tab by mouth two (2) times a day. Indications: BACTERIAL URINARY TRACT INFECTION  
 1 Tab  
    
   
   
   
  
 mupirocin 2 % ointment Commonly known as:  [facility] Your last dose was: Your next dose is:    
   
   
 by Both Nostrils route two (2) times a day for 5 days. Apply pea size amount to Q tip and insert into edge of nostril- repeat other side. oxybutynin chloride XL 15 mg CR tablet Commonly known as:  DITROPAN XL Your last dose was: Your next dose is: Take 15 mg by mouth nightly. 15 mg  
    
   
   
   
  
 pioglitazone 30 mg tablet Commonly known as:  ACTOS Your last dose was: Your next dose is:    
   
   
 take 1 tablet by mouth once daily  
     
   
   
   
  
 sertraline 50 mg tablet Commonly known as:  ZOLOFT Your last dose was: Your next dose is:    
   
   
 take 1 tablet by mouth once daily BEFORE BREAKFAST  
     
   
   
   
  
 VITAMIN B-12 PO Your last dose was: Your next dose is: Take 1 Tab by mouth every morning. 1 Tab

## 2018-07-23 NOTE — INTERVAL H&P NOTE
H&P Update: Vannesa Alejandro was seen and examined. History and physical has been reviewed.  Significant clinical changes have occurred as noted:  Being treated for uti with bactrim    Signed By: Da Garcia MD     July 23, 2018 10:07 AM

## 2018-07-23 NOTE — BRIEF OP NOTE
BRIEF OPERATIVE NOTE    Date of Procedure: 7/23/2018   Preoperative Diagnosis: HYDRANEPHORSIS, SOLITARY LEFT KIDNEY   Postoperative Diagnosis: HYDRANEPHORSIS, SOLITARY LEFT KIDNEY     Procedure(s):  CYSTOSCOPY LEFT STENT CHANGE  Surgeon(s) and Role:     * Gary Henning MD - Primary         Surgical Assistant: 0    Surgical Staff:  Circ-1: Jalen Mai RN  Circ-2: Edgar Lizama RN  Scrub Tech-1: Render [place]  Event Time In   Incision Start 1045   Incision Close 1053     Anesthesia: General   Estimated Blood Loss: 0  Specimens: * No specimens in log *   Findings: no hydro   Complications: 0  Implants:   Implant Name Type Inv.  Item Serial No.  Lot No. LRB No. Used Action   STENT URET FLX SFT [id number] -- CONTOUR PERCUFLEX - SNA   STENT URET FLX SFT [id number] -- CONTOUR PERCUFLEX NA [employer]gree [facility] UROLOGY-WOMENS [facility] [id number] Left 1 Implanted

## 2018-07-24 ENCOUNTER — PATIENT OUTREACH (OUTPATIENT)
Dept: FAMILY MEDICINE CLINIC | Age: 63
End: 2018-07-24

## 2018-07-24 NOTE — OP NOTES
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 POSTOPERATIVE DIAGNOSIS:  Solitary left kidney with ureteral obstruction due to fibrosis. PROCEDURE PERFORMED:  Cystoscopy, left stent removal, left retrograde pyelogram, left stent replacement, 7 x 22 stent. SURGEON:  Roxi Lew MD.    ANESTHESIA:  General.    INDICATIONS:  Well known to us with the above problem. Preoperative culture was positive. She is on appropriate antibiotics. She presents for stent change. IMPLANTS:  A 6 x 22 double-J stent. ESTIMATED BLOOD LOSS:  Zero. COMPLICATIONS:  None. ASSISTANT:  None. SPECIMENS REMOVED: 0     DESCRIPTION OF PROCEDURE:  After consent was obtained, the patient was taken to the operating room. After adequate anesthesia was obtained, she was prepped and draped in lithotomy position. The rigid cystoscope was inserted in the bladder per urethra. The stent was then placed. There was a little bit of erythema consistent with the stent. No bladder lesions noted. Bladder was irrigated free of debris. I then grasped the indwelling stent, pulled it out through the meatus, cut off the loop, and then tried to pass the Bentson wire; however, the wire would not come out the proximal end because it was kinked. I then pulled the stent out, went back in with the cystoscope and engaged the left ureter with an open-ended catheter. I injected contrast retrograde, which outlined a completely decompressed collecting system. I then passed a Bentson wire through the open-ended catheter, coiled it in the upper pole jim. The open-ended catheter was then removed. I then placed a stent in over the wire, proximal end coiled in the renal pelvis and distal end curled within the bladder.   The bladder drained, the scope withdrawn. Bimanual exam did not reveal any masses. She was then awakened from anesthesia and taken to recovery room in stable condition. PLAN:  She will finish her antibiotics and will plan on stent replacement in 4 months or so.       MD ANNE MARIE Horton / Tabitha Sorto  D: 07/24/2018 16:37     T: 07/24/2018 17:18  JOB #: [id number]

## 2018-10-01 DIAGNOSIS — E11.9 TYPE 2 DIABETES MELLITUS WITHOUT COMPLICATION, WITHOUT LONG-TERM CURRENT USE OF INSULIN (HCC): ICD-10-CM

## 2018-10-01 RX ORDER — GLIPIZIDE 2.5 MG/1
TABLET, EXTENDED RELEASE ORAL
Qty: 90 TAB | Refills: 0 | Status: SHIPPED | OUTPATIENT
Start: 2018-10-01 | End: 2019-02-27 | Stop reason: SDUPTHER

## 2018-10-01 NOTE — TELEPHONE ENCOUNTER
Last A1c > 1 yr ago, LOv in 3/2018  Refilling glipizide for 90 days supply  Pt should make appt within the next 1-2 months for DM f/u appt with labs

## 2018-10-02 ENCOUNTER — DOCUMENTATION ONLY (OUTPATIENT)
Dept: FAMILY MEDICINE CLINIC | Age: 63
End: 2018-10-02

## 2018-10-02 NOTE — PROGRESS NOTES
Call from Darwin Houser, [phone], asking that we call her regarding current medications for this patient, Reyes Merrill. We do not have a current PHI, and the names on the form we have do not match caller's name. We have not seen patient since March. Lm Juarez, who works for a law firm, is the  we have been talking to in the past.  The number, Darwin Houser left is the same number we used to reach Lm Juarez; however, when I called it, the prompt requested an extension number and said to press [phone] if you didn't know extension, which I did, but that didn't work.     Gal Villagomez April

## 2018-10-03 NOTE — TELEPHONE ENCOUNTER
Message left on Venita's voice mail that a refill was sent on 10/1/18 to the [address] [place] [address]. Also asked to an appointment to be scheduled. Chart review shows LOV 3/26/18.

## 2018-10-09 DIAGNOSIS — E78.00 HIGH CHOLESTEROL: ICD-10-CM

## 2018-10-23 DIAGNOSIS — E78.00 HIGH CHOLESTEROL: ICD-10-CM

## 2018-10-25 ENCOUNTER — TELEPHONE (OUTPATIENT)
Dept: FAMILY MEDICINE CLINIC | Age: 63
End: 2018-10-25

## 2018-10-25 NOTE — TELEPHONE ENCOUNTER
Messagejeannie from law firm that takes care of this patient, name sounded like Mayte saying that the patient, Karen Mercado, is completely out of medication. She said pharmacy has submitted a Request, which is in her chart. [facility] which [facility]. We haven't seen her since March. PHI needs to be updated. Lyn Lopez was the point of contact for this patient.     Colton Nguyen April

## 2018-10-26 NOTE — TELEPHONE ENCOUNTER
Routing refill request to Dr. Elise Weinstein as Freeman Coy was last provider to see pt in March. She needs her atorvastatin refilled per her [address].  Chelle Sanon RN

## 2018-10-27 RX ORDER — ATORVASTATIN CALCIUM 10 MG/1
TABLET, FILM COATED ORAL
Qty: 30 TAB | Refills: 1 | Status: SHIPPED | OUTPATIENT
Start: 2018-10-27 | End: 2018-12-11 | Stop reason: SDUPTHER

## 2018-10-30 RX ORDER — ATORVASTATIN CALCIUM 10 MG/1
TABLET, FILM COATED ORAL
Qty: 90 TAB | Refills: 1 | OUTPATIENT
Start: 2018-10-30

## 2018-11-06 ENCOUNTER — TELEPHONE (OUTPATIENT)
Dept: FAMILY MEDICINE CLINIC | Age: 63
End: 2018-11-06

## 2018-11-06 NOTE — TELEPHONE ENCOUNTER
Call from Genaro Ponce, from [employer], the law firm, with the POA for this patient, Jaye Walker, called to ask if this patient should come in for a wellness check. Is it okay to schedule an appointment for her? Also, she needs to complete a new PHI.     Leopold Caroline April

## 2018-11-08 ENCOUNTER — TELEPHONE (OUTPATIENT)
Dept: FAMILY MEDICINE CLINIC | Age: 63
End: 2018-11-08

## 2018-11-08 NOTE — TELEPHONE ENCOUNTER
Unable to reach the law office of [address]. Checked [employer] and we are using the correct number. Getting message that number is invalid number. Perhaps the letter will elicit a response. Will keep trying.     Karlyn Meckel April

## 2018-12-11 DIAGNOSIS — E78.00 HIGH CHOLESTEROL: ICD-10-CM

## 2018-12-11 RX ORDER — ATORVASTATIN CALCIUM 10 MG/1
TABLET, FILM COATED ORAL
Qty: 90 TAB | Refills: 1 | Status: SHIPPED | OUTPATIENT
Start: 2018-12-11 | End: 2019-04-22 | Stop reason: SDUPTHER

## 2018-12-11 NOTE — TELEPHONE ENCOUNTER
Faxed refill request for Atorvastatin 10 mg tab. Routing to provider Anni BUTLER for review and completion.

## 2018-12-14 ENCOUNTER — HOSPITAL ENCOUNTER (OUTPATIENT)
Dept: PREADMISSION TESTING | Age: 63
Discharge: HOME | End: 2018-12-14
Payer: SELF-PAY

## 2018-12-14 VITALS
HEART RATE: 66 BPM | WEIGHT: 147.2 LBS | HEIGHT: 55 IN | TEMPERATURE: 98.2 F | DIASTOLIC BLOOD PRESSURE: 67 MMHG | SYSTOLIC BLOOD PRESSURE: 164 MMHG | BODY MASS INDEX: 34.07 KG/M2 | RESPIRATION RATE: 16 BRPM | OXYGEN SATURATION: 97 %

## 2018-12-14 LAB
ALBUMIN SERPL-MCNC: 3.2 G/DL (ref 3.5–5)
ALBUMIN/GLOB SERPL: 0.8 {RATIO} (ref 1.1–2.2)
ALP SERPL-CCNC: 149 U/L (ref 45–117)
ALT SERPL-CCNC: 18 U/L (ref 12–78)
ANION GAP SERPL CALC-SCNC: 12 MMOL/L (ref 5–15)
APPEARANCE UR: ABNORMAL
AST SERPL-CCNC: 25 U/L (ref 15–37)
ATRIAL RATE: 60 BPM
BACTERIA URNS QL MICRO: ABNORMAL /HPF
BASOPHILS # BLD: 0.1 K/UL (ref 0–0.1)
BASOPHILS NFR BLD: 1 % (ref 0–1)
BILIRUB SERPL-MCNC: 0.2 MG/DL (ref 0.2–1)
BILIRUB UR QL: NEGATIVE
BUN SERPL-MCNC: 28 MG/DL (ref 6–20)
BUN/CREAT SERPL: 18 (ref 12–20)
CALCIUM SERPL-MCNC: 9.3 MG/DL (ref 8.5–10.1)
CALCULATED P AXIS, ECG09: 33 DEGREES
CALCULATED R AXIS, ECG10: 7 DEGREES
CALCULATED T AXIS, ECG11: 35 DEGREES
CHLORIDE SERPL-SCNC: 108 MMOL/L (ref 97–108)
CO2 SERPL-SCNC: 21 MMOL/L (ref 21–32)
COLOR UR: ABNORMAL
COMMENT, HOLDF: NORMAL
CREAT SERPL-MCNC: 1.59 MG/DL (ref 0.55–1.02)
DIAGNOSIS, 93000: NORMAL
DIFFERENTIAL METHOD BLD: ABNORMAL
EOSINOPHIL # BLD: 0.2 K/UL (ref 0–0.4)
EOSINOPHIL NFR BLD: 3 % (ref 0–7)
EPITH CASTS URNS QL MICRO: ABNORMAL /LPF
ERYTHROCYTE [DISTWIDTH] IN BLOOD BY AUTOMATED COUNT: 15.3 % (ref 11.5–14.5)
GLOBULIN SER CALC-MCNC: 4.2 G/DL (ref 2–4)
GLUCOSE SERPL-MCNC: 116 MG/DL (ref 65–100)
GLUCOSE UR STRIP.AUTO-MCNC: NEGATIVE MG/DL
HCT VFR BLD AUTO: 35.4 % (ref 35–47)
HGB BLD-MCNC: 10.5 G/DL (ref 11.5–16)
HGB UR QL STRIP: ABNORMAL
IMM GRANULOCYTES # BLD: 0 K/UL (ref 0–0.04)
IMM GRANULOCYTES NFR BLD AUTO: 0 % (ref 0–0.5)
KETONES UR QL STRIP.AUTO: NEGATIVE MG/DL
LEUKOCYTE ESTERASE UR QL STRIP.AUTO: ABNORMAL
LYMPHOCYTES # BLD: 1.1 K/UL (ref 0.8–3.5)
LYMPHOCYTES NFR BLD: 20 % (ref 12–49)
MCH RBC QN AUTO: 27.3 PG (ref 26–34)
MCHC RBC AUTO-ENTMCNC: 29.7 G/DL (ref 30–36.5)
MCV RBC AUTO: 92.2 FL (ref 80–99)
MONOCYTES # BLD: 0.5 K/UL (ref 0–1)
MONOCYTES NFR BLD: 9 % (ref 5–13)
NEUTS SEG # BLD: 3.6 K/UL (ref 1.8–8)
NEUTS SEG NFR BLD: 67 % (ref 32–75)
NITRITE UR QL STRIP.AUTO: POSITIVE
NRBC # BLD: 0 K/UL (ref 0–0.01)
NRBC BLD-RTO: 0 PER 100 WBC
P-R INTERVAL, ECG05: 132 MS
PH UR STRIP: 6 [PH] (ref 5–8)
PLATELET # BLD AUTO: 144 K/UL (ref 150–400)
PMV BLD AUTO: 10.9 FL (ref 8.9–12.9)
POTASSIUM SERPL-SCNC: 4.9 MMOL/L (ref 3.5–5.1)
PROT SERPL-MCNC: 7.4 G/DL (ref 6.4–8.2)
PROT UR STRIP-MCNC: 100 MG/DL
Q-T INTERVAL, ECG07: 380 MS
QRS DURATION, ECG06: 80 MS
QTC CALCULATION (BEZET), ECG08: 380 MS
RBC # BLD AUTO: 3.84 M/UL (ref 3.8–5.2)
RBC #/AREA URNS HPF: ABNORMAL /HPF (ref 0–5)
SAMPLES BEING HELD,HOLD: NORMAL
SODIUM SERPL-SCNC: 141 MMOL/L (ref 136–145)
SP GR UR REFRACTOMETRY: 1.01 (ref 1–1.03)
UA: UC IF INDICATED,UAUC: ABNORMAL
UROBILINOGEN UR QL STRIP.AUTO: 0.2 EU/DL (ref 0.2–1)
VENTRICULAR RATE, ECG03: 60 BPM
WBC # BLD AUTO: 5.4 K/UL (ref 3.6–11)
WBC CASTS URNS QL MICRO: ABNORMAL /LPF
WBC URNS QL MICRO: >100 /HPF (ref 0–4)

## 2018-12-14 PROCEDURE — 85025 COMPLETE CBC W/AUTO DIFF WBC: CPT

## 2018-12-14 PROCEDURE — 87086 URINE CULTURE/COLONY COUNT: CPT

## 2018-12-14 PROCEDURE — 87077 CULTURE AEROBIC IDENTIFY: CPT

## 2018-12-14 PROCEDURE — 36415 COLL VENOUS BLD VENIPUNCTURE: CPT

## 2018-12-14 PROCEDURE — 93005 ELECTROCARDIOGRAM TRACING: CPT

## 2018-12-14 PROCEDURE — 87186 SC STD MICRODIL/AGAR DIL: CPT

## 2018-12-14 PROCEDURE — 81001 URINALYSIS AUTO W/SCOPE: CPT

## 2018-12-14 PROCEDURE — 80053 COMPREHEN METABOLIC PANEL: CPT

## 2018-12-14 NOTE — PERIOP NOTES
Multiphase contact isolation order entered. PAT interview/preop instructions reviewed with patient and caregiver, Sonia Vásquez.

## 2018-12-14 NOTE — H&P
PAT Pre-Op History & Physical    Patient: Silvia Goldman                  MRN: [id number]          SSN: [id number]  YOB: 1955          Age: 59 y.o. Sex: female                Subjective:   Patient is a 59 y.o.  female who presents with history of left hydronephrosis. This is attributed to ureteral narrowing secondary to radiation therapy for uterine cancer. Has solitary left kidney due to right nephrectomy in 1998. Requires periodic cystoscopies to change left ureteral stent- last done 7/23/2018. The patient was evaluated in the surgeon's office and it was determined that the most appropriate plan of care is to proceed with surgical intervention. Patient's PCP CARRIE Corcoarn    Patient is accompanied by her caregiver April Goldberg who helps answer questions. Past Medical History:   Diagnosis Date    Anemia     Chronic kidney disease     stage 3    Coronary artery disease 9/17/2014    A. Echo (9/16/14):  EF 45% with mid-distal septal/anterior HK, DD.   PASP 37.    Coronary artery disease     MI 9/2014    Depression     Diabetes ([address]     DM type 2 causing renal disease ([address]     History of nephrectomy     Right kidney    Hx MRSA infection     had negative swab [phone] but + swab on 5/16/2017, negative swab 09/06/2017& 3/14/2018. + again 7/17/2018    Hyperlipidemia     Hypertension     Mental retardation     Nephrolithiasis     hx    Urge incontinence     Uterine cancer ([address]     UTI (lower urinary tract infection)     chronic    Vitamin D deficiency       Past Surgical History:   Procedure Laterality Date    HX CHOLECYSTECTOMY      HX DILATION AND CURETTAGE      HX HYSTERECTOMY      HX ORTHOPAEDIC      ORIF left arm, hx of fracture    HX ORTHOPAEDIC      Right ankle fx repair    HX OTHER SURGICAL  9/2014    I&D right leg wound    HX UROLOGICAL  1998    right nephrectomy    HX UROLOGICAL Left     multiple renal stents      Prior to Admission medications    Medication Sig Start Date End Date Taking? Authorizing Provider   atorvastatin (LIPITOR) 10 mg tablet take 1 tablet by mouth once daily 12/11/18  Yes Brian Khan PA   glipiZIDE SR (GLUCOTROL XL) 2.5 mg CR tablet take 1 tablet by mouth once daily BEFORE MEALS 10/1/18  Yes Yasemin Ervin MD   cyanocobalamin, vitamin B-12, (VITAMIN B-12 PO) Take 1 Tab by mouth every morning. Yes Provider, Historical   oxybutynin chloride XL (DITROPAN XL) 15 mg CR tablet Take 15 mg by mouth nightly. Yes Provider, Historical   pioglitazone (ACTOS) 30 mg tablet take 1 tablet by mouth once daily 5/14/18  Yes Brian Khan PA   atenolol (TENORMIN) 25 mg tablet Take 1 Tab by mouth daily. Patient taking differently: Take 25 mg by mouth nightly. 3/26/18  Yes Danielle Awad NP   sertraline (ZOLOFT) 50 mg tablet take 1 tablet by mouth once daily BEFORE BREAKFAST 3/26/18  Yes Danielle Awad NP   aspirin delayed-release 81 mg tablet Take 81 mg by mouth nightly. Yes Provider, Historical     Current Outpatient Medications   Medication Sig    atorvastatin (LIPITOR) 10 mg tablet take 1 tablet by mouth once daily    glipiZIDE SR (GLUCOTROL XL) 2.5 mg CR tablet take 1 tablet by mouth once daily BEFORE MEALS    cyanocobalamin, vitamin B-12, (VITAMIN B-12 PO) Take 1 Tab by mouth every morning.  oxybutynin chloride XL (DITROPAN XL) 15 mg CR tablet Take 15 mg by mouth nightly.  pioglitazone (ACTOS) 30 mg tablet take 1 tablet by mouth once daily    atenolol (TENORMIN) 25 mg tablet Take 1 Tab by mouth daily. (Patient taking differently: Take 25 mg by mouth nightly.)    sertraline (ZOLOFT) 50 mg tablet take 1 tablet by mouth once daily BEFORE BREAKFAST    aspirin delayed-release 81 mg tablet Take 81 mg by mouth nightly. No current facility-administered medications for this encounter.        Allergies   Allergen Reactions    Hydrocodone Nausea Only    Ibuprofen Unknown (comments)     Pt cannot take because of having only one kidney    Penicillins Rash     Tolerates Cephalexin       Social History     Tobacco Use    Smoking status: Never Smoker    Smokeless tobacco: Never Used   Substance Use Topics    Alcohol use: No     Alcohol/week: 0.0 oz      Social History     Substance and Sexual Activity   Drug Use No     Family History   Problem Relation Age of Onset    Mental Retardation Mother     Heart Disease Mother     No Known Problems Sister     Breast Cancer Sister     No Known Problems Brother     Malignant Hyperthermia Neg Hx     Pseudocholinesterase Deficiency Neg Hx     Delayed Awakening Neg Hx     Post-op Nausea/Vomiting Neg Hx     Emergence Delirium Neg Hx     Post-op Cognitive Dysfunction Neg Hx          Review of Systems    Patient denies difficulty swallowing, mouth sores, or loose teeth. Patient denies any recent dental procedures or any planned prior to surgery. Patient denies chest pain, tightness, pain radiating down left arm, palpitations. Denies dizziness, visual disturbances, or lightheadedness. Patient denies shortness of breath, wheezing, cough, fever, or chills. Patient denies diarrhea, constipation, or abdominal pain. Patient denies urinary problems including dysuria or hesitancy. Does have urge incontinence at times. Denies skin breakdown, rashes, insect bites or open area. Objective:     Patient Vitals for the past 24 hrs:   Temp Pulse Resp BP SpO2   18 0912 98.2 °F (36.8 °C) 66 16 164/67 97 %     Temp (24hrs), Av.2 °F (36.8 °C), Min:98.2 °F (36.8 °C), Max:98.2 °F (36.8 °C)    Body mass index is 34.21 kg/m². Wt Readings from Last 1 Encounters:   18 66.8 kg (147 lb 3.2 oz)        Physical Exam:     General: Pleasant,  cooperative, no apparent distress, appears stated age. Eyes: Conjunctivae/corneas clear. EOMs intact.    Nose: Nares normal.   Mouth/Throat: Lips, mucosa, and tongue normal. Top plate dentures in place- no lower teeth   Neck: Supple, symmetrical, trachea midline. Back: Symmetric   Lungs: Clear to auscultation bilaterally. Heart: Regular rate and rhythm, S1, S2 normal. No murmur, click, rub or gallop. Abdomen: Soft, non-tender. Bowel sounds normal. No distention. Musculoskeletal:  Unremarkable. Extremities:  Extremities normal, atraumatic, no cyanosis or edema. Calves                                 supple, non tender to palpation. Pulses: 2+ and symmetric bilateral upper extremities. Cap. refill <2 seconds   Skin: Skin color, texture, turgor normal.  No visible rashes or lesions. Neurologic: CN II-XII grossly intact. Alert and oriented x3. Labs:   Recent Results (from the past 72 hour(s))   CBC WITH AUTOMATED DIFF    Collection Time: 12/14/18  9:41 AM   Result Value Ref Range    WBC 5.4 3.6 - 11.0 K/uL    RBC 3.84 3.80 - 5.20 M/uL    HGB 10.5 (L) 11.5 - 16.0 g/dL    HCT 35.4 35.0 - 47.0 %    MCV 92.2 80.0 - 99.0 FL    MCH 27.3 26.0 - 34.0 PG    MCHC 29.7 (L) 30.0 - 36.5 g/dL    RDW 15.3 (H) 11.5 - 14.5 %    PLATELET 272 (L) 557 - 400 K/uL    MPV 10.9 8.9 - 12.9 FL    NRBC 0.0 0  WBC    ABSOLUTE NRBC 0.00 0.00 - 0.01 K/uL    NEUTROPHILS 67 32 - 75 %    LYMPHOCYTES 20 12 - 49 %    MONOCYTES 9 5 - 13 %    EOSINOPHILS 3 0 - 7 %    BASOPHILS 1 0 - 1 %    IMMATURE GRANULOCYTES 0 0.0 - 0.5 %    ABS. NEUTROPHILS 3.6 1.8 - 8.0 K/UL    ABS. LYMPHOCYTES 1.1 0.8 - 3.5 K/UL    ABS. MONOCYTES 0.5 0.0 - 1.0 K/UL    ABS. EOSINOPHILS 0.2 0.0 - 0.4 K/UL    ABS. BASOPHILS 0.1 0.0 - 0.1 K/UL    ABS. IMM.  GRANS. 0.0 0.00 - 0.04 K/UL    DF AUTOMATED     METABOLIC PANEL, COMPREHENSIVE    Collection Time: 12/14/18  9:41 AM   Result Value Ref Range    Sodium 141 136 - 145 mmol/L    Potassium 4.9 3.5 - 5.1 mmol/L    Chloride 108 97 - 108 mmol/L    CO2 21 21 - 32 mmol/L    Anion gap 12 5 - 15 mmol/L    Glucose 116 (H) 65 - 100 mg/dL    BUN 28 (H) 6 - 20 MG/DL    Creatinine 1.59 (H) 0.55 - 1.02 MG/DL BUN/Creatinine ratio 18 12 - 20      GFR est AA 40 (L) >60 ml/min/1.73m2    GFR est non-AA 33 (L) >60 ml/min/1.73m2    Calcium 9.3 8.5 - 10.1 MG/DL    Bilirubin, total 0.2 0.2 - 1.0 MG/DL    ALT (SGPT) 18 12 - 78 U/L    AST (SGOT) 25 15 - 37 U/L    Alk. phosphatase 149 (H) 45 - 117 U/L    Protein, total 7.4 6.4 - 8.2 g/dL    Albumin 3.2 (L) 3.5 - 5.0 g/dL    Globulin 4.2 (H) 2.0 - 4.0 g/dL    A-G Ratio 0.8 (L) 1.1 - 2.2     URINALYSIS W/ REFLEX CULTURE    Collection Time: 12/14/18  9:41 AM   Result Value Ref Range    Color YELLOW/STRAW      Appearance TURBID (A) CLEAR      Specific gravity 1.010 1.003 - 1.030      pH (UA) 6.0 5.0 - 8.0      Protein 100 (A) NEG mg/dL    Glucose NEGATIVE  NEG mg/dL    Ketone NEGATIVE  NEG mg/dL    Bilirubin NEGATIVE  NEG      Blood MODERATE (A) NEG      Urobilinogen 0.2 0.2 - 1.0 EU/dL    Nitrites POSITIVE (A) NEG      Leukocyte Esterase LARGE (A) NEG      WBC >100 (H) 0 - 4 /hpf    RBC 10-20 0 - 5 /hpf    Epithelial cells FEW FEW /lpf    Bacteria 4+ (A) NEG /hpf    UA:UC IF INDICATED URINE CULTURE ORDERED (A) CNI      WBC cast 0-2 (A) NEG /lpf   SAMPLES BEING HELD    Collection Time: 12/14/18  9:41 AM   Result Value Ref Range    SAMPLES BEING HELD 1sst     COMMENT        Add-on orders for these samples will be processed based on acceptable specimen integrity and analyte stability, which may vary by analyte.    EKG, 12 LEAD, INITIAL    Collection Time: 12/14/18 10:15 AM   Result Value Ref Range    Ventricular Rate 60 BPM    Atrial Rate 60 BPM    P-R Interval 132 ms    QRS Duration 80 ms    Q-T Interval 380 ms    QTC Calculation (Bezet) 380 ms    Calculated P Axis 33 degrees    Calculated R Axis 7 degrees    Calculated T Axis 35 degrees    Diagnosis       Normal sinus rhythm  Minimal voltage criteria for LVH, may be normal variant  Borderline ECG  When compared with ECG of 14-MAR-2018 12:24,  No significant change was found         Assessment:     Hydronephrosis, solitary kidney    Plan:     Scheduled for cystoscopy, left ureteral stent exchange. Labs and EKG done per surgeon's orders. Lab results reviewed- creatine/GFR results at baseline. UA triggered C&S (pending). Final EKG interpretation pending. MRSA swab sent- patient had + swab 7/17/2018- was treated by SARAH YUAN with Mupirocin ointment per protocol. Result pending.     George Joseph NP

## 2018-12-14 NOTE — PERIOP NOTES
[address] [address] [facility]                            MAIN OR                                  [phone]   MAIN PRE OP                          [phone]                                                                                AMBULATORY PRE OP          [phone]  PRE-ADMISSION TESTING    [phone]     Surgery Date:   Monday 12/17/18                                                           Is surgery arrival time given by surgeon? NO  If NO, [facility] staff will call you between 3 and 7pm the day before your surgery with your arrival time. (If your surgery is on a Monday, we will call you the Friday before.)    Call [phone] after 7pm Monday-Friday if you did not receive your arrival time. INSTRUCTIONS BEFORE YOUR SURGERY   When You  Arrive   Arrive at the [address] [facility] on the day of your surgery  Have your insurance card, photo ID, and any copayment (if needed)     Food   and   Drink   NO food or drink after midnight the night before surgery    This means NO water, gum, mints, coffee, juice, etc.  No alcohol (beer, wine, liquor) 24 hours before and after surgery     Medications to   TAKE   Morning of Surgery   MEDICATIONS TO TAKE THE MORNING OF SURGERY WITH A SIP OF WATER:    zoloft     Medications  To  STOP      7 days before surgery    Non-Steroidal anti-inflammatory Drugs (NSAID's): for example, Ibuprofen (Advil, Motrin), Naproxen (Aleve)   Aspirin, if taking for pain    Herbal supplements, vitamins, and fish oil   Other:  (Pain medications not listed above, including Tylenol may be taken)   Blood  Thinners    If you take  Aspirin, Plavix, Coumadin, or any blood-thinning or anti-blood clot medicine, talk to the doctor who prescribed the medications for pre-operative instructions.      Bathing Clothing  Jewelry  Valuables       If you shower the morning of surgery, please do not apply anything to your skin (lotions, powders, deodorant, or makeup, especially mascara)   Follow all special bath instructions (for total joint replacement, spine and bowel surgeries)   Do not shave or trim anywhere 24 hours before surgery   Wear your hair loose or down; no pony-tails, buns, or metal hair clips   Wear loose, comfortable, clean clothes   Wear glasses instead of contacts   Leave money, valuables, and jewelry, including body piercings, at home     Going Home       or Spending the Night    SAME-DAY SURGERY: You must have a responsible adult drive you home and stay with you 24 hours after surgery   ADMITS: If your doctor is keeping you into the hospital after surgery, leave personal belongings/luggage in your car until you have a hospital room number. Hospital discharge time is 12 noon  Drivers must be here before 12 noon unless you are told differently   Special Instructions   Free  parking 7am-5pm, high protein snack before bedtime       Follow all instructions so your surgery wont be cancelled. Please, be on time. If a situation occurs and you are delayed the day of surgery, call [phone] or          [phone]. If your physical condition changes (like a fever, cold, flu, etc.) call your surgeon. The patient and caregiver was contacted  in person. The patient verbalizes understanding of all instructions and does not  need reinforcement.

## 2018-12-15 LAB
BACTERIA SPEC CULT: NORMAL
BACTERIA SPEC CULT: NORMAL
SERVICE CMNT-IMP: NORMAL

## 2018-12-19 DIAGNOSIS — E11.9 TYPE 2 DIABETES MELLITUS WITHOUT COMPLICATION, WITHOUT LONG-TERM CURRENT USE OF INSULIN (HCC): ICD-10-CM

## 2018-12-19 NOTE — TELEPHONE ENCOUNTER
Faxed refill request for Atenolol 25mg tablet. Routing to provider Silvana BUTLER for review and completion.

## 2018-12-20 RX ORDER — ATENOLOL 25 MG/1
25 TABLET ORAL DAILY
Qty: 90 TAB | Refills: 3 | Status: SHIPPED | OUTPATIENT
Start: 2018-12-20 | End: 2019-04-22 | Stop reason: SDUPTHER

## 2018-12-26 NOTE — PERIOP NOTES
Patient's surgery has been rescheduled to 12/28/2018. Patient has a history of +MRSA and ESBL. Contact isolation orders have been entered into [facility] under sign and held, multi-phase orders for the day of surgery.   DOS: 12/28/2018

## 2018-12-27 ENCOUNTER — ANESTHESIA EVENT (OUTPATIENT)
Dept: SURGERY | Age: 63
End: 2018-12-27
Payer: SELF-PAY

## 2018-12-28 ENCOUNTER — APPOINTMENT (OUTPATIENT)
Dept: GENERAL RADIOLOGY | Age: 63
End: 2018-12-28
Attending: UROLOGY
Payer: SELF-PAY

## 2018-12-28 ENCOUNTER — ANESTHESIA (OUTPATIENT)
Dept: SURGERY | Age: 63
End: 2018-12-28
Payer: SELF-PAY

## 2018-12-28 ENCOUNTER — HOSPITAL ENCOUNTER (OUTPATIENT)
Age: 63
Setting detail: OUTPATIENT SURGERY
Discharge: NURSING, REHAB OR LONG-TERM CARE | End: 2018-12-28
Attending: UROLOGY | Admitting: UROLOGY
Payer: SELF-PAY

## 2018-12-28 VITALS
HEIGHT: 55 IN | OXYGEN SATURATION: 99 % | WEIGHT: 147.71 LBS | BODY MASS INDEX: 34.18 KG/M2 | HEART RATE: 92 BPM | RESPIRATION RATE: 22 BRPM | TEMPERATURE: 98.2 F | DIASTOLIC BLOOD PRESSURE: 80 MMHG | SYSTOLIC BLOOD PRESSURE: 150 MMHG

## 2018-12-28 LAB
GLUCOSE BLD STRIP.AUTO-MCNC: 109 MG/DL (ref 65–100)
GLUCOSE BLD STRIP.AUTO-MCNC: 124 MG/DL (ref 65–100)
SERVICE CMNT-IMP: ABNORMAL
SERVICE CMNT-IMP: ABNORMAL

## 2018-12-28 PROCEDURE — 74011250636 HC RX REV CODE- 250/636

## 2018-12-28 PROCEDURE — 76210000021 HC REC RM PH II 0.5 TO 1 HR: Performed by: UROLOGY

## 2018-12-28 PROCEDURE — C1758 CATHETER, URETERAL: HCPCS | Performed by: UROLOGY

## 2018-12-28 PROCEDURE — 74011250636 HC RX REV CODE- 250/636: Performed by: ANESTHESIOLOGY

## 2018-12-28 PROCEDURE — 74011000250 HC RX REV CODE- 250

## 2018-12-28 PROCEDURE — 76060000032 HC ANESTHESIA 0.5 TO 1 HR: Performed by: UROLOGY

## 2018-12-28 PROCEDURE — C2617 STENT, NON-COR, TEM W/O DEL: HCPCS | Performed by: UROLOGY

## 2018-12-28 PROCEDURE — 76010000138 HC OR TIME 0.5 TO 1 HR: Performed by: UROLOGY

## 2018-12-28 PROCEDURE — 87086 URINE CULTURE/COLONY COUNT: CPT

## 2018-12-28 PROCEDURE — 74011636320 HC RX REV CODE- 636/320: Performed by: UROLOGY

## 2018-12-28 PROCEDURE — 77030019927 HC TBNG IRR CYSTO BAXT -A: Performed by: UROLOGY

## 2018-12-28 PROCEDURE — 77030020782 HC GWN BAIR PAWS FLX 3M -B

## 2018-12-28 PROCEDURE — 82962 GLUCOSE BLOOD TEST: CPT

## 2018-12-28 PROCEDURE — 74011250636 HC RX REV CODE- 250/636: Performed by: UROLOGY

## 2018-12-28 PROCEDURE — 76000 FLUOROSCOPY <1 HR PHYS/QHP: CPT

## 2018-12-28 PROCEDURE — 76210000006 HC OR PH I REC 0.5 TO 1 HR: Performed by: UROLOGY

## 2018-12-28 PROCEDURE — 77030020143 HC AIRWY LARYN INTUB CGAS -A: Performed by: NURSE ANESTHETIST, CERTIFIED REGISTERED

## 2018-12-28 PROCEDURE — 77030018832 HC SOL IRR H20 ICUM -A: Performed by: UROLOGY

## 2018-12-28 DEVICE — URETERAL STENT
Type: IMPLANTABLE DEVICE | Site: URETER | Status: FUNCTIONAL
Brand: CONTOUR™

## 2018-12-28 RX ORDER — SODIUM CHLORIDE, SODIUM LACTATE, POTASSIUM CHLORIDE, CALCIUM CHLORIDE 600; 310; 30; 20 MG/100ML; MG/100ML; MG/100ML; MG/100ML
125 INJECTION, SOLUTION INTRAVENOUS CONTINUOUS
Status: DISCONTINUED | OUTPATIENT
Start: 2018-12-28 | End: 2018-12-28 | Stop reason: HOSPADM

## 2018-12-28 RX ORDER — ONDANSETRON 2 MG/ML
4 INJECTION INTRAMUSCULAR; INTRAVENOUS AS NEEDED
Status: DISCONTINUED | OUTPATIENT
Start: 2018-12-28 | End: 2018-12-28 | Stop reason: SDUPTHER

## 2018-12-28 RX ORDER — HYDROMORPHONE HYDROCHLORIDE 1 MG/ML
.25-1 INJECTION, SOLUTION INTRAMUSCULAR; INTRAVENOUS; SUBCUTANEOUS
Status: DISCONTINUED | OUTPATIENT
Start: 2018-12-28 | End: 2018-12-28 | Stop reason: HOSPADM

## 2018-12-28 RX ORDER — DIPHENHYDRAMINE HYDROCHLORIDE 50 MG/ML
12.5 INJECTION, SOLUTION INTRAMUSCULAR; INTRAVENOUS AS NEEDED
Status: DISCONTINUED | OUTPATIENT
Start: 2018-12-28 | End: 2018-12-28 | Stop reason: HOSPADM

## 2018-12-28 RX ORDER — FENTANYL CITRATE 50 UG/ML
INJECTION, SOLUTION INTRAMUSCULAR; INTRAVENOUS AS NEEDED
Status: DISCONTINUED | OUTPATIENT
Start: 2018-12-28 | End: 2018-12-28 | Stop reason: HOSPADM

## 2018-12-28 RX ORDER — SODIUM CHLORIDE 0.9 % (FLUSH) 0.9 %
5-10 SYRINGE (ML) INJECTION EVERY 8 HOURS
Status: DISCONTINUED | OUTPATIENT
Start: 2018-12-28 | End: 2018-12-28 | Stop reason: HOSPADM

## 2018-12-28 RX ORDER — HYDROMORPHONE HYDROCHLORIDE 1 MG/ML
.25-1 INJECTION, SOLUTION INTRAMUSCULAR; INTRAVENOUS; SUBCUTANEOUS
Status: DISCONTINUED | OUTPATIENT
Start: 2018-12-28 | End: 2018-12-28 | Stop reason: SDUPTHER

## 2018-12-28 RX ORDER — SODIUM CHLORIDE, SODIUM LACTATE, POTASSIUM CHLORIDE, CALCIUM CHLORIDE 600; 310; 30; 20 MG/100ML; MG/100ML; MG/100ML; MG/100ML
INJECTION, SOLUTION INTRAVENOUS
Status: DISCONTINUED | OUTPATIENT
Start: 2018-12-28 | End: 2018-12-28 | Stop reason: HOSPADM

## 2018-12-28 RX ORDER — LIDOCAINE HYDROCHLORIDE 20 MG/ML
INJECTION, SOLUTION EPIDURAL; INFILTRATION; INTRACAUDAL; PERINEURAL AS NEEDED
Status: DISCONTINUED | OUTPATIENT
Start: 2018-12-28 | End: 2018-12-28 | Stop reason: HOSPADM

## 2018-12-28 RX ORDER — DIPHENHYDRAMINE HYDROCHLORIDE 50 MG/ML
12.5 INJECTION, SOLUTION INTRAMUSCULAR; INTRAVENOUS AS NEEDED
Status: DISCONTINUED | OUTPATIENT
Start: 2018-12-28 | End: 2018-12-28 | Stop reason: SDUPTHER

## 2018-12-28 RX ORDER — SODIUM CHLORIDE 0.9 % (FLUSH) 0.9 %
5-10 SYRINGE (ML) INJECTION AS NEEDED
Status: DISCONTINUED | OUTPATIENT
Start: 2018-12-28 | End: 2018-12-28 | Stop reason: HOSPADM

## 2018-12-28 RX ORDER — LIDOCAINE HYDROCHLORIDE 10 MG/ML
0.1 INJECTION, SOLUTION EPIDURAL; INFILTRATION; INTRACAUDAL; PERINEURAL AS NEEDED
Status: DISCONTINUED | OUTPATIENT
Start: 2018-12-28 | End: 2018-12-28 | Stop reason: HOSPADM

## 2018-12-28 RX ORDER — MIDAZOLAM HYDROCHLORIDE 1 MG/ML
INJECTION, SOLUTION INTRAMUSCULAR; INTRAVENOUS AS NEEDED
Status: DISCONTINUED | OUTPATIENT
Start: 2018-12-28 | End: 2018-12-28 | Stop reason: HOSPADM

## 2018-12-28 RX ORDER — SODIUM CHLORIDE, SODIUM LACTATE, POTASSIUM CHLORIDE, CALCIUM CHLORIDE 600; 310; 30; 20 MG/100ML; MG/100ML; MG/100ML; MG/100ML
125 INJECTION, SOLUTION INTRAVENOUS CONTINUOUS
Status: DISCONTINUED | OUTPATIENT
Start: 2018-12-28 | End: 2018-12-28 | Stop reason: SDUPTHER

## 2018-12-28 RX ORDER — NALOXONE HYDROCHLORIDE 0.4 MG/ML
0.04 INJECTION, SOLUTION INTRAMUSCULAR; INTRAVENOUS; SUBCUTANEOUS
Status: DISCONTINUED | OUTPATIENT
Start: 2018-12-28 | End: 2018-12-28 | Stop reason: HOSPADM

## 2018-12-28 RX ORDER — FLUMAZENIL 0.1 MG/ML
0.2 INJECTION INTRAVENOUS
Status: DISCONTINUED | OUTPATIENT
Start: 2018-12-28 | End: 2018-12-28 | Stop reason: HOSPADM

## 2018-12-28 RX ORDER — GLYCOPYRROLATE 0.2 MG/ML
INJECTION INTRAMUSCULAR; INTRAVENOUS AS NEEDED
Status: DISCONTINUED | OUTPATIENT
Start: 2018-12-28 | End: 2018-12-28 | Stop reason: HOSPADM

## 2018-12-28 RX ORDER — CEFAZOLIN SODIUM/WATER 2 G/20 ML
2 SYRINGE (ML) INTRAVENOUS
Status: COMPLETED | OUTPATIENT
Start: 2018-12-28 | End: 2018-12-28

## 2018-12-28 RX ORDER — SULFAMETHOXAZOLE AND TRIMETHOPRIM 400; 80 MG/1; MG/1
TABLET ORAL
Refills: 0 | COMMUNITY
Start: 2018-12-20 | End: 2019-04-22

## 2018-12-28 RX ORDER — PROPOFOL 10 MG/ML
INJECTION, EMULSION INTRAVENOUS AS NEEDED
Status: DISCONTINUED | OUTPATIENT
Start: 2018-12-28 | End: 2018-12-28 | Stop reason: HOSPADM

## 2018-12-28 RX ORDER — ONDANSETRON 2 MG/ML
4 INJECTION INTRAMUSCULAR; INTRAVENOUS AS NEEDED
Status: DISCONTINUED | OUTPATIENT
Start: 2018-12-28 | End: 2018-12-28 | Stop reason: HOSPADM

## 2018-12-28 RX ADMIN — Medication 2 G: at 10:15

## 2018-12-28 RX ADMIN — FENTANYL CITRATE 50 MCG: 50 INJECTION, SOLUTION INTRAMUSCULAR; INTRAVENOUS at 10:05

## 2018-12-28 RX ADMIN — SODIUM CHLORIDE, SODIUM LACTATE, POTASSIUM CHLORIDE, AND CALCIUM CHLORIDE 125 ML/HR: 600; 310; 30; 20 INJECTION, SOLUTION INTRAVENOUS at 09:25

## 2018-12-28 RX ADMIN — SODIUM CHLORIDE, SODIUM LACTATE, POTASSIUM CHLORIDE, CALCIUM CHLORIDE: 600; 310; 30; 20 INJECTION, SOLUTION INTRAVENOUS at 09:45

## 2018-12-28 RX ADMIN — LIDOCAINE HYDROCHLORIDE 40 MG: 20 INJECTION, SOLUTION EPIDURAL; INFILTRATION; INTRACAUDAL; PERINEURAL at 10:11

## 2018-12-28 RX ADMIN — MIDAZOLAM HYDROCHLORIDE 2 MG: 1 INJECTION, SOLUTION INTRAMUSCULAR; INTRAVENOUS at 10:05

## 2018-12-28 RX ADMIN — GLYCOPYRROLATE 0.2 MG: 0.2 INJECTION INTRAMUSCULAR; INTRAVENOUS at 10:15

## 2018-12-28 RX ADMIN — PROPOFOL 180 MG: 10 INJECTION, EMULSION INTRAVENOUS at 10:11

## 2018-12-28 NOTE — PERIOP NOTES
Pt awake, alert, talkative, eating/drinking, D/C reviewed with caregiver Ibrahima Officer, pt voided, assisted in dressing self, anxious to leave, questions/concerns addressed.

## 2018-12-28 NOTE — DISCHARGE INSTRUCTIONS
Discharge home. Office in 2 weeks. Call for fever or pain    DISCHARGE SUMMARY from your Nurse    The following personal items collected during your admission are returned to you:   Dental Appliance: Dental Appliances: Uppers(placed with belongings)  Vision: Visual Aid: Glasses  Hearing Aid:    Jewelry: Jewelry: None  Clothing: Clothing: Other (comment)(clothing placed into bag and into locker)  Other Valuables: Other Valuables: Eyeglasses, Purse(purse given to Imelda Hicks -caregiver)  Valuables sent to safe:      PATIENT INSTRUCTIONS:    After general anesthesia or intravenous sedation, for 24 hours or while taking prescription Narcotics:  · Limit your activities  · Do not drive and operate hazardous machinery  · Do not make important personal or business decisions  · Do  not drink alcoholic beverages  · If you have not urinated within 8 hours after discharge, please contact your surgeon on call. Report the following to your surgeon:  · Excessive pain, swelling, redness or odor of or around the surgical area  · Temperature over 100.5  · Nausea and vomiting lasting longer than 4 hours or if unable to take medications  · Any signs of decreased circulation or nerve impairment to extremity: change in color, persistent  numbness, tingling, coldness or increase pain  · Any questions    COUGH AND DEEP BREATHE    Breathing deep and coughing are very important exercises to do after surgery. Deep breathing and coughing open the little air tubes and air sacks in your lungs. You take deep breaths every day. You may not even notice - it is just something you do when you sigh or yawn. It is a natural exercise you do to keep these air passages open. After surgery, take deep breaths and cough, on purpose. Coughing and deep breathing help prevent bronchitis and pneumonia after surgery. If you had chest or belly surgery, use a pillow as a \"hug buddy\" and hold it tightly to your chest or belly when you cough. DIRECTIONS:  6. Take 10 to 15 slow deep breaths every hour while awake. 7. Breathe in deeply, and hold it for 2 seconds. 8. Exhale slowly through puckered lips, like blowing up a balloon. 9. After every 4th or 5th deep breath, hug your pillow to your chest or belly and give a hard, deep cough. Yes, it will probably hurt. But doing this exercise is very important part of healing after surgery. Take your pain medicine to help you do this exercise without too much pain. IF YOU HAVE BEEN DIAGNOSED WITH SLEEP APNEA, PLEASE USE YOUR SLEEP APNEA DEVICE OR CPAP MACHINE WHEN YOU INTEND TO NAP AFTER TAKING PAIN MEDICATION. Ankle Pumps    Ankle pumps increase the circulation of oxygenated blood to your lower extremities and decrease your risk for circulation problems such as blood clots. They also stretch the muscles, tendons and ligaments in your foot and ankle, and prevent joint contracture in the ankle and foot, especially after surgeries on the legs. It is important to do ankle pump exercises regularly after surgery because immobility increases your risk for developing a blood clot. Your doctor may also have you take an Aspirin for the next few days as well. If your doctor did not ask you to take an Aspirin, consult with him before starting Aspirin therapy on your own. Slowly point your foot forward, feeling the muscles on the top of your lower leg stretch, and hold this position for 5 seconds. Next, pull your foot back toward you as far as possible, stretching the calf muscles, and hold that position for 5 seconds. Repeat with the other foot. Perform 10 repetitions every hour while awake for both ankles if possible (down and then up with the foot once is one repetition). You should feel gentle stretching of the muscles in your lower leg when doing this exercise. If you feel pain, or your range of motion is limited, don't  Push too hard.   Only go the limit your joint and muscles will let you go. If you have increasing pain, progressively worsening leg warmth or swelling, STOP the exercise and call your doctor. Below is information about the medications your doctor is prescribing after your visit:    Other information in your discharge envelope:  []     PRESCRIPTIONS  []     PHYSICAL THERAPY PRESCRIPTION  []     APPOINTMENT CARDS  []     Regional Anesthesia Pamphlet for block or block with On-Q Catheter from Anesthesia Service  []     Medical device information sheets/pamphlets from their    []     School/work excuse note. []     /parent work excuse note. These are general instructions for a healthy lifestyle:    *  Please give a list of your current medications to your Primary Care Provider. *  Please update this list whenever your medications are discontinued, doses are      changed, or new medications (including over-the-counter products) are added. *  Please carry medication information at all times in case of emergency situations. About Smoking  No smoking / No tobacco products / Avoid exposure to second hand smoke    Surgeon General's Warning:  Quitting smoking now greatly reduces serious risk to your health. Obesity, smoking, and sedentary lifestyle greatly increases your risk for illness and disease. A healthy diet, regular physical exercise & weight monitoring are important for maintaining a healthy lifestyle. Congestive Heart Failure  You may be retaining fluid if you have a history of heart failure or if you experience any of the following symptoms:  Weight gain of 3 pounds or more overnight or 5 pounds in a week, increased swelling in our hands or feet or shortness of breath while lying flat in bed. Please call your doctor as soon as you notice any of these symptoms; do not wait until your next office visit.     Recognize signs and symptoms of STROKE:  F - face looks uneven  A - arms unable to move or move even  S - speech slurred or non-existent  T - time-call 911 as soon as signs and symptoms begin-DO NOT go         Back to bed or wait to see if you get better-TIME IS BRAIN. Warning signs of HEART ATTACK  Call 911 if you have these symptoms    · Chest discomfort. Most heart attacks involve discomfort in the center of the chest that lasts more than a few minutes, or that goes away and comes back. It can feel like uncomfortable pressure, squeezing, fullness, or pain. · Discomfort in other areas of the upper body. Symptoms can include pain or discomfort in one or both        Arms, the back, neck, jaw, or stomach. ·  Shortness of breath with or without chest discomfort. · Other signs may include breaking out in a cold sweat, nausea, or lightheadedness    Don't wait more than five minutes to call 911 - MINUTES MATTER! Fast action can save your life. Calling 911 is almost always the fastest way to get lifesaving treatment. Emergency Medical Services staff can begin treatment when they arrive - up to an hour sooner than if someone gets to the hospital by car. [facility] MEDICATION AND SIDE EFFECT GUIDE    The [place] MEDICATION AND SIDE EFFECT GUIDE was provided to the PATIENT AND CARE PROVIDER.   Information provided includes instruction about drug purpose and common side effects for the following medications:    ·

## 2018-12-28 NOTE — OP NOTES
[facility] 80  OPERATIVE REPORT    Dexter Baum  MR#: [id number]  : 1955  ACCOUNT #: [de-identified]   DATE OF SERVICE: 2018    PREOPERATIVE DIAGNOSES:  Left hydronephrosis with an indwelling left double-J stent for stent change and a history of urinary tract infection, status post antibiotic course. POSTOPERATIVE DIAGNOSES:  Left hydronephrosis with an indwelling left double-J stent for stent change and a history of urinary tract infection, status post antibiotic course with mild inflammatory changes of her bladder. No obvious infection. Double-J stent in place with some mild encrustation. PROCEDURES PERFORMED:  Cystoscopy, urine culture, left double-J stent exchange with left retrograde pyelogram.    SURGEON:  Cresencio Acosta MD    ASSISTANT:  none     ANESTHESIA:  General.    CLINICAL INDICATIONS:  The patient is a lady who has been followed with a history of an indwelling left double-J stent as she has had a history of obstruction of this left ureter and a solitary kidney. She has been managed with indwelling double J stents and double-J stent changes. She presents now for repeat stent change. Potential risks and complications were reviewed with her. OPERATIVE PROCEDURE:  She was taken to the operating room and placed in the dorsal lithotomy position after general anesthesia was induced. She was given parenteral antibiotics preoperatively. Perineum was prepped and draped. Timeout was taken. A cystoscope was introduced. Urethra is normal.  Her bladder had some mild inflammatory changes. Her left ureteral orifice was visualized and this double-J stent was seen coming from this orifice. I did send some urine for culture, although she had minimal urine in her bladder at that time. We did use graspers to pull the double-J stent to her urethral meatus. I cut the tip off and advanced a Bentson wire up to the level of the renal pelvis.   After this was done, an angiographic catheter was advanced over the Bentson wire to the level of the renal pelvis. The wire was removed and retrograde contrast was instilled demonstrating a delicate collecting system. She had a little bit of prominence of her upper pole jim, but the remainder of her collecting system was delicate. I then replaced the wire and removed the angiographic catheter and then advanced a 6-Icelandic 22 cm double-J stent over the wire. The proximal coil was in the renal pelvis and the distal coil in the bladder and this was verified cystoscopically and fluoroscopically. She tolerated this well. There were no intraoperative complications. SPECIMENS REMOVED:  Urine for culture. IMPLANTS:  Left double-J stent.     ESTIMATED BLOOD LOSS:  none     COMPLICATIONS: none      MD STEFANIA Chirinos / [place]  D: 12/28/2018 10:50     T: 12/28/2018 11:41  JOB #: [id number]

## 2018-12-28 NOTE — ANESTHESIA PREPROCEDURE EVALUATION
Anesthetic History No history of anesthetic complications Review of Systems / Medical History Patient summary reviewed, nursing notes reviewed and pertinent labs reviewed Pulmonary Within defined limits Neuro/Psych Within defined limits Cardiovascular Within defined limits Hypertension Past MI and CAD Exercise tolerance: >4 METS 
  
GI/Hepatic/Renal 
  
 
 
Renal disease: stones and CRI Endo/Other Diabetes Cancer and anemia Other Findings Comments: Mental retardation EF 45% Hx uterine cancer Physical Exam 
 
Airway Mallampati: II 
 
Neck ROM: normal range of motion Mouth opening: Normal 
 
 Cardiovascular Regular rate and rhythm,  S1 and S2 normal,  no murmur, click, rub, or gallop Rhythm: regular Rate: normal 
 
 
 
 Dental 
No notable dental hx Pulmonary Breath sounds clear to auscultation Abdominal 
GI exam deferred Other Findings Anesthetic Plan ASA: 3 Anesthesia type: general 
 
 
 
 
Induction: Intravenous Anesthetic plan and risks discussed with: Patient

## 2018-12-28 NOTE — ANESTHESIA POSTPROCEDURE EVALUATION
Procedure(s): 
CYSTOSCOPY, LEFT URETERAL STENT EXCHANGE. Anesthesia Post Evaluation Multimodal analgesia: multimodal analgesia not used between 6 hours prior to anesthesia start to PACU discharge Patient location during evaluation: PACU Patient participation: complete - patient participated Level of consciousness: awake and alert Pain score: 0 Airway patency: patent Anesthetic complications: no 
Cardiovascular status: acceptable Respiratory status: acceptable Hydration status: acceptable Post anesthesia nausea and vomiting:  none Visit Vitals /73 Pulse 95 Temp 37 °C (98.6 °F) Resp 19 Ht 4' 7\" (1.397 m) Wt 67 kg (147 lb 11.3 oz) SpO2 100% BMI 34.33 kg/m²

## 2018-12-28 NOTE — OP NOTES
Pre op dx: left ureteral obstruction with indwelling ureteral stent. Post op dx: same  Procedure: cystoscopy and left ureteral stent change. Surgeon: Evelina Akins MD  Assistant: none  Findings: left ureteral stent with mild hydronephrosis.   Complications: none  Specimens: urine for culture  Implants: left ureteral stent  Evelina Akins MD

## 2018-12-29 LAB
BACTERIA SPEC CULT: ABNORMAL
CC UR VC: ABNORMAL
SERVICE CMNT-IMP: ABNORMAL

## 2019-02-26 ENCOUNTER — TELEPHONE (OUTPATIENT)
Dept: FAMILY MEDICINE CLINIC | Age: 64
End: 2019-02-26

## 2019-02-26 NOTE — TELEPHONE ENCOUNTER
Patient call main office 2/26/19 to request medicine refill because she is out of medicine . Patient wants to make sure the doctor or nurse received this message so they can fax medicine refill to pharmacy .     Thank you     Att:Patrizia Larsen

## 2019-02-27 ENCOUNTER — TELEPHONE (OUTPATIENT)
Dept: FAMILY MEDICINE CLINIC | Age: 64
End: 2019-02-27

## 2019-02-27 DIAGNOSIS — E11.9 TYPE 2 DIABETES MELLITUS WITHOUT COMPLICATION, WITHOUT LONG-TERM CURRENT USE OF INSULIN (HCC): ICD-10-CM

## 2019-02-27 RX ORDER — GLIPIZIDE 2.5 MG/1
TABLET, EXTENDED RELEASE ORAL
Qty: 30 TAB | Refills: 1 | Status: SHIPPED | OUTPATIENT
Start: 2019-02-27 | End: 2019-04-22 | Stop reason: SDUPTHER

## 2019-02-27 NOTE — TELEPHONE ENCOUNTER
Thalia Muller from [address] [place] [address] office is calling to get a refill on the patients medication. I informed her we have not seen this patient since 2007. She stated you refilled this medication for the patient recently. She would like to know if you can refill it because the patient will be out in 2 days and should not be off of it. I verified the [facility] is the correct pharmacy. If you have any questions Verónica's phone number is [phone].     glipiZIDE SR (GLUCOTROL XL) 2.5 mg CR tablet

## 2019-02-27 NOTE — TELEPHONE ENCOUNTER
I attempted to reach pt, but no answer  I am sending glipizide SR 2.5mg daily x 30 tabs with 1 refill. She will need to est alternate PCP (if now has insurance?) or RTC prior to next refill.  Has been > 1 yr since LOV for DM

## 2019-02-27 NOTE — TELEPHONE ENCOUNTER
Maame Vo called office where I am working today to inquire about refill for pt. She identified herself as a representative of the pt who works in a  office. I reviewed PHI forms, last from 9/2017 and [place] not on form. I asked her why pt herself could not call and she says \"you wouldn't be able to understand her on the phone. \" I told Maame Allenthers that I was not be able to discuss any details of the pt's care with her and encouraged her to either call her pharmacy who could then communicate with us or have pt come in for appt and complete updated forms to include her/anyone else that pt desires to assist with her care. Maame Vo expressed understanding.

## 2019-02-27 NOTE — TELEPHONE ENCOUNTER
Scott Quarles, (not quite sure if she is on the HIPPA) called to inquire about Emmanuelle's refill at this time. well developed

## 2019-03-05 NOTE — TELEPHONE ENCOUNTER
Letter done w/ April 22,2019 at 2:15p. LOV was 3/2018. Call placed to POA of HIPA form [employer] or [employer]. Message left that Glipizide refill was sent in on 2/27/19 and April appt seen scheduled. Pt /POA usually need to come in the mornings so this appt can be rescheduled to allow for law office staff schedule.

## 2019-04-10 ENCOUNTER — TELEPHONE (OUTPATIENT)
Dept: FAMILY MEDICINE CLINIC | Age: 64
End: 2019-04-10

## 2019-04-10 NOTE — TELEPHONE ENCOUNTER
Received a message regarding patient, Kriss Tirado. The name of caller sounded like Osman Conteh and she gave a return number of [phone]. She said she was returning Nanci's call.     Karey Hooks April

## 2019-04-12 NOTE — TELEPHONE ENCOUNTER
RN returned call to number left by CHAUR,[phone]. Saima Ramos in [facility] office answered and advised that Tariq Dickson is no longer in that office. SHe advised that Clara Xie was on the phone and could not speak with me. She was also aware that I could not speak with her as she is not on the PHI. The last PHI in media is dated 6/27/17. Konrad Orta advised that pt needs a medication which she will run out of before her appt on the [facility] on 4/22/19. She advised that RN call back shortly when Clara Xie will be off the phone.   Liborio Epps RN

## 2019-04-12 NOTE — TELEPHONE ENCOUNTER
RN called [facility] office two more times today. Once, Jose F Fernandez was still on the phone, and the office closed at 12:00PM, so was closed when I called in afternoon.  Uli Guthrie RN

## 2019-04-22 ENCOUNTER — HOSPITAL ENCOUNTER (OUTPATIENT)
Dept: LAB | Age: 64
Discharge: HOME | End: 2019-04-22

## 2019-04-22 ENCOUNTER — OFFICE VISIT (OUTPATIENT)
Dept: FAMILY MEDICINE CLINIC | Age: 64
End: 2019-04-22

## 2019-04-22 VITALS
BODY MASS INDEX: 33.93 KG/M2 | DIASTOLIC BLOOD PRESSURE: 71 MMHG | SYSTOLIC BLOOD PRESSURE: 168 MMHG | HEART RATE: 69 BPM | WEIGHT: 146 LBS | TEMPERATURE: 98.6 F

## 2019-04-22 DIAGNOSIS — R32 URINARY INCONTINENCE, UNSPECIFIED TYPE: ICD-10-CM

## 2019-04-22 DIAGNOSIS — Z12.11 SCREEN FOR COLON CANCER: ICD-10-CM

## 2019-04-22 DIAGNOSIS — E11.9 TYPE 2 DIABETES MELLITUS WITHOUT COMPLICATION, WITHOUT LONG-TERM CURRENT USE OF INSULIN (HCC): ICD-10-CM

## 2019-04-22 DIAGNOSIS — E78.00 HIGH CHOLESTEROL: ICD-10-CM

## 2019-04-22 DIAGNOSIS — E11.9 TYPE 2 DIABETES MELLITUS WITHOUT COMPLICATION, WITHOUT LONG-TERM CURRENT USE OF INSULIN (HCC): Primary | ICD-10-CM

## 2019-04-22 DIAGNOSIS — F79 INTELLECTUAL DISABILITY: ICD-10-CM

## 2019-04-22 PROBLEM — E11.21 TYPE 2 DIABETES WITH NEPHROPATHY (HCC): Status: ACTIVE | Noted: 2019-04-22

## 2019-04-22 LAB
ALBUMIN SERPL-MCNC: 3.6 G/DL (ref 3.5–5)
ALBUMIN/GLOB SERPL: 0.9 {RATIO} (ref 1.1–2.2)
ALP SERPL-CCNC: 143 U/L (ref 45–117)
ALT SERPL-CCNC: 17 U/L (ref 12–78)
ANION GAP SERPL CALC-SCNC: 5 MMOL/L (ref 5–15)
AST SERPL-CCNC: 24 U/L (ref 15–37)
BILIRUB SERPL-MCNC: 0.3 MG/DL (ref 0.2–1)
BUN SERPL-MCNC: 26 MG/DL (ref 6–20)
BUN/CREAT SERPL: 19 (ref 12–20)
CALCIUM SERPL-MCNC: 8.6 MG/DL (ref 8.5–10.1)
CHLORIDE SERPL-SCNC: 112 MMOL/L (ref 97–108)
CHOLEST SERPL-MCNC: 163 MG/DL
CO2 SERPL-SCNC: 25 MMOL/L (ref 21–32)
CREAT SERPL-MCNC: 1.38 MG/DL (ref 0.55–1.02)
EST. AVERAGE GLUCOSE BLD GHB EST-MCNC: 146 MG/DL
GLOBULIN SER CALC-MCNC: 4.2 G/DL (ref 2–4)
GLUCOSE POC: NORMAL MG/DL
GLUCOSE SERPL-MCNC: 99 MG/DL (ref 65–100)
HBA1C MFR BLD: 6.7 % (ref 4.2–6.3)
HDLC SERPL-MCNC: 50 MG/DL
HDLC SERPL: 3.3 {RATIO} (ref 0–5)
LDLC SERPL CALC-MCNC: 75.4 MG/DL (ref 0–100)
LIPID PROFILE,FLP: ABNORMAL
POTASSIUM SERPL-SCNC: 5.7 MMOL/L (ref 3.5–5.1)
PROT SERPL-MCNC: 7.8 G/DL (ref 6.4–8.2)
SODIUM SERPL-SCNC: 142 MMOL/L (ref 136–145)
TRIGL SERPL-MCNC: 188 MG/DL (ref ?–150)
VLDLC SERPL CALC-MCNC: 37.6 MG/DL

## 2019-04-22 PROCEDURE — 83036 HEMOGLOBIN GLYCOSYLATED A1C: CPT

## 2019-04-22 PROCEDURE — 80053 COMPREHEN METABOLIC PANEL: CPT

## 2019-04-22 PROCEDURE — 80061 LIPID PANEL: CPT

## 2019-04-22 RX ORDER — OXYBUTYNIN CHLORIDE 15 MG/1
15 TABLET, EXTENDED RELEASE ORAL
Qty: 90 TAB | Refills: 3 | Status: SHIPPED | OUTPATIENT
Start: 2019-04-22

## 2019-04-22 RX ORDER — PIOGLITAZONEHYDROCHLORIDE 30 MG/1
TABLET ORAL
Qty: 90 TAB | Refills: 3 | Status: SHIPPED | OUTPATIENT
Start: 2019-04-22

## 2019-04-22 RX ORDER — LISINOPRIL 5 MG/1
5 TABLET ORAL DAILY
Qty: 90 TAB | Refills: 3 | Status: SHIPPED | OUTPATIENT
Start: 2019-04-22

## 2019-04-22 RX ORDER — SERTRALINE HYDROCHLORIDE 50 MG/1
TABLET, FILM COATED ORAL
Qty: 90 TAB | Refills: 3 | Status: SHIPPED | OUTPATIENT
Start: 2019-04-22

## 2019-04-22 RX ORDER — GLIPIZIDE 2.5 MG/1
TABLET, EXTENDED RELEASE ORAL
Qty: 90 TAB | Refills: 3 | Status: SHIPPED | OUTPATIENT
Start: 2019-04-22

## 2019-04-22 RX ORDER — ATORVASTATIN CALCIUM 10 MG/1
TABLET, FILM COATED ORAL
Qty: 90 TAB | Refills: 3 | Status: SHIPPED | OUTPATIENT
Start: 2019-04-22

## 2019-04-22 RX ORDER — LANOLIN ALCOHOL/MO/W.PET/CERES
1000 CREAM (GRAM) TOPICAL
Qty: 90 TAB | Refills: 3 | Status: SHIPPED | OUTPATIENT
Start: 2019-04-22

## 2019-04-22 RX ORDER — ATENOLOL 25 MG/1
25 TABLET ORAL DAILY
Qty: 90 TAB | Refills: 3 | Status: SHIPPED | OUTPATIENT
Start: 2019-04-22

## 2019-04-22 NOTE — PROGRESS NOTES
Met w/ pt and new care giver, Shemar Leon. HIPA form and paperwork updated today. AVS printed and reviewed. Supplies for stool collection for occult blood given and discussed. No [facility] clinic next month re Holiday w/e. Next [facility] clinic June 24,2019 for drop off of stool collection. Good Rx coupons printed for [employer] including Generic Actos $268.50 with coupon, generic Ditropan xl $97 w/ coupon. Discussed how pharmacies have different prices, showed how to use Good RX site, encouraged to call [facility] office nurse for questions/problems. Unable to provide a urine sample today. Will come for lab visit 6/24/19 to drop off stool sample and give a urine sample. Given and discussed [facility] flyer. Reviewed ER for any emergency.

## 2019-04-22 NOTE — PROGRESS NOTES
HISTORY OF PRESENT ILLNESS  Terra Espinosa is a 62 y.o. female. HPI  Patient is doing well, no concerns. Has not had blood test in a while. Checks sugar levels regularly usually are in the low 100s. No headache no chest pain no shortness of breath  Needs medication refills today. Review of Systems   Constitutional: Negative for chills, fever and weight loss. HENT: Negative for sore throat. Eyes: Negative for blurred vision. Respiratory: Negative for cough, shortness of breath and wheezing. Cardiovascular: Negative for chest pain, palpitations and leg swelling. Gastrointestinal: Negative for abdominal pain, constipation, diarrhea, heartburn, nausea and vomiting. Genitourinary: Negative for dysuria, hematuria and urgency. Musculoskeletal: Negative for back pain and joint pain. Skin: Negative for itching and rash. Neurological: Negative for dizziness and headaches. /71 (BP 1 Location: Left arm, BP Patient Position: Sitting)   Pulse 69   Temp 98.6 °F (37 °C) (Oral)   Wt 146 lb (66.2 kg)   BMI 33.93 kg/m²   Physical Exam   Constitutional: She appears well-developed and well-nourished. HENT:   Head: Normocephalic. Right Ear: External ear normal.   Left Ear: External ear normal.   Eyes: Pupils are equal, round, and reactive to light. Conjunctivae and EOM are normal.   Neck: Normal range of motion. Neck supple. No thyromegaly present. Cardiovascular: Normal rate, regular rhythm and normal heart sounds. No murmur heard. Pulmonary/Chest: Breath sounds normal. No respiratory distress. Abdominal: Soft. Bowel sounds are normal. She exhibits no distension. Musculoskeletal: Normal range of motion. She exhibits no edema. Neurological: She is alert. No cranial nerve deficit. Skin: Skin is warm. No erythema. ASSESSMENT and PLAN  Diagnoses and all orders for this visit:    1.  Type 2 diabetes mellitus without complication, without long-term current use of insulin ([facility]  -     AMB POC GLUCOSE BLOOD, BY GLUCOSE MONITORING DEVICE  -     pioglitazone (ACTOS) 30 mg tablet; take 1 tablet by mouth once daily  -     glipiZIDE SR (GLUCOTROL XL) 2.5 mg CR tablet; take 1 tablet by mouth once daily BEFORE MEAL  -     atenolol (TENORMIN) 25 mg tablet; Take 1 Tab by mouth daily. -     lisinopril (PRINIVIL, ZESTRIL) 5 mg tablet; Take 1 Tab by mouth daily.  -     LIPID PANEL; Future  -     METABOLIC PANEL, COMPREHENSIVE; Future  -     MICROALBUMIN, UR, RAND W/ MICROALB/CREAT RATIO  -     HEMOGLOBIN A1C WITH EAG; Future    2. Intellectual disability  -     sertraline (ZOLOFT) 50 mg tablet; take 1 tablet by mouth once daily BEFORE BREAKFAST  -     cyanocobalamin (VITAMIN B-12) 1,000 mcg tablet; Take 1 Tab by mouth every morning. 3. High cholesterol  -     atorvastatin (LIPITOR) 10 mg tablet; take 1 tablet by mouth once daily    4. Screen for colon cancer  -     OCCULT BLOOD IMMUNOASSAY,DIAGNOSTIC; Future    5. Urinary incontinence, unspecified type  -     oxybutynin chloride XL (DITROPAN XL) 15 mg CR tablet; Take 1 Tab by mouth nightly.       Will order pap smear and mammogram  Will order colon cancer screening  Medications refilled today  Follow up in 3 months

## 2019-04-22 NOTE — PROGRESS NOTES
Results for orders placed or performed in visit on 04/22/19   AMB POC GLUCOSE BLOOD, BY GLUCOSE MONITORING DEVICE   Result Value Ref Range    Glucose  nf mg/dL

## 2019-04-25 NOTE — PROGRESS NOTES
Normal cholesterol  Stable kidney function,  Potassium levels were elevated (but this was a difficult sample and this could be the reason of the increase)  Diabetes is well controlled.   We may check potassium levels in her next visit  Patient's caregiver can be informed

## 2019-05-24 NOTE — PROGRESS NOTES
Letter mailed to the pt which included the providers lab results note and recommendation's.  Brando Moon RN

## 2020-01-07 NOTE — ADDENDUM NOTE
Addended by: Robbi AGGARWAL on: 2/27/2019 10:00 AM     Modules accepted: Orders Keep incision clean and dry  Take Tylenol or Motrin for pain  Apply ice to the incision while awake  See Dr Rhett Chi in the office in 2 days  Call [phone] for a time

## 2020-02-05 ENCOUNTER — DOCUMENTATION ONLY (OUTPATIENT)
Dept: FAMILY MEDICINE CLINIC | Age: 65
End: 2020-02-05

## 2020-12-29 NOTE — PROGRESS NOTES
[facility] [address]  [address] [place] [address], [address] [place] [address]  [phone]      Medical Progress Note      NAME: Garth Bear   :  1955  MRM:  [id number]    Date/Time: 2017         Subjective:     Chief Complaint:  Patient was seen and examined by me. Chart reviewed. Denied fevers, chills       Objective:       Vitals:       Last 24hrs VS reviewed since prior progress note. Most recent are:    Visit Vitals    /58 (BP 1 Location: Right arm, BP Patient Position: At rest)    Pulse 81    Temp 98.2 °F (36.8 °C)    Resp 18    Ht 4' 8\" (1.422 m)    Wt 72.8 kg (160 lb 7.9 oz)    SpO2 95%    Breastfeeding No    BMI 35.98 kg/m2     SpO2 Readings from Last 6 Encounters:   17 95%   01/10/17 100%   16 96%   16 100%   16 100%   16 96%        No intake or output data in the 24 hours ending 17 1238     Exam:     Physical Exam:    Gen:  Well-developed, well-nourished, in no acute distress, pleasant  HEENT:  Pink conjunctivae, PERRL, hearing intact to voice, moist mucous membranes  Neck:  Supple, without masses, thyroid non-tender  Resp:  No accessory muscle use, clear breath sounds without wheezes rales or rhonchi  Card:  No murmurs, normal S1, S2 without thrills, bruits or peripheral edema  Abd:  Soft, non-tender, non-distended, normoactive bowel sounds are present  Musc:  No cyanosis or clubbing  Skin:  No rashes   Neuro:  Cranial nerves 3-12 are grossly intact, follows commands appropriately, baseline dysarthria  Psych:  poor insight, oriented to person.   Mental retardation    Medications Reviewed: (see below)    Lab Data Reviewed: (see below)    ______________________________________________________________________    Medications:     Current Facility-Administered Medications   Medication Dose Route Frequency    insulin lispro (HUMALOG) injection   SubCUTAneous AC&HS    meropenem (MERREM) 500 mg in 0.9% sodium chloride (MBP/ADV) 50 mL  500 mg IntraVENous Q8H    aspirin delayed-release tablet 81 mg  81 mg Oral QHS    atorvastatin (LIPITOR) tablet 10 mg  10 mg Oral DAILY    cyanocobalamin (VITAMIN B12) tablet 1,000 mcg  1,000 mcg Oral DAILY    glipiZIDE SR (GLUCOTROL XL) tablet 2.5 mg  2.5 mg Oral ACB&D    HYDROcodone-acetaminophen (NORCO) 5-325 mg per tablet 1 Tab  1 Tab Oral Q4H PRN    metoprolol tartrate (LOPRESSOR) tablet 12.5 mg  12.5 mg Oral BID    multivitamin with iron tablet 1 Tab  1 Tab Oral DAILY    pioglitazone (ACTOS) tablet 30 mg  30 mg Oral DAILY    sertraline (ZOLOFT) tablet 50 mg  50 mg Oral DAILY    tolterodine ER (DETROL-LA) capsule 2 mg  2 mg Oral QHS    sodium chloride (NS) flush 5-10 mL  5-10 mL IntraVENous Q8H    sodium chloride (NS) flush 5-10 mL  5-10 mL IntraVENous PRN    heparin (porcine) injection 5,000 Units  5,000 Units SubCUTAneous Q8H    glucose chewable tablet 16 g  4 Tab Oral PRN    dextrose (D50W) injection syrg 12.5-25 g  12.5-25 g IntraVENous PRN    glucagon (GLUCAGEN) injection 1 mg  1 mg IntraMUSCular PRN    0.9% sodium chloride infusion  75 mL/hr IntraVENous CONTINUOUS          Lab Review:     Recent Labs      01/14/17   0630  01/13/17 1937   WBC  5.5  6.9   HGB  9.3*  9.1*   HCT  28.9*  30.2*   PLT  140*  144*     Recent Labs      01/14/17   0630  01/13/17 1937   NA  140  136   K  4.4  4.7   CL  106  103   CO2  27  24   GLU  62*  81   BUN  21*  19   CREA  1.69*  1.60*   CA  8.2*  8.3*   ALB   --   3.1*   TBILI   --   0.2   SGOT   --   24   ALT   --   14     Lab Results   Component Value Date/Time    Glucose (POC) 122 01/14/2017 12:14 PM    Glucose (POC) 79 01/14/2017 11:48 AM    Glucose (POC) 139 01/14/2017 07:47 AM    Glucose (POC) 72 01/14/2017 07:22 AM    Glucose (POC) 132 01/13/2017 10:57 PM    Glucose (POC) 318 09/13/2014 03:06 PM    Glucose (POC) 169 01/27/2010 07:23 PM    Glucose (POC) 153 10/11/2009 08:37 PM    Glucose  nonfasting 09/26/2016 02:01 PM    Glucose POC 80 non fasting 04/25/2016 01:18 PM    Glucose POC 91 nonfasting 10/26/2015 12:53 PM    Glucose  non fasting 08/24/2015 12:38 PM    Glucose  not fasting 06/22/2015 01:22 PM          Assessment / Plan: Active Problems:    61 yo hx of mental retardation, R nephrectomy, recent L ureteral stent, DM, CKD 3, presented w/ complicated UTI, ESBL E. Coli    1) Complicated UTI/ESBL infection: recurrent UTI due to nephrolithiasis and stents. Has solitary L kidney. UCx with ESBL E. Coli. Will cont meropenem. Will need PICC line and home IV Abx. Consult ID    2) DM type 2 w/ renal complications: Z6O 3.0%. Cont glipizide, SSI    3) Solitary L kidney/chronic ureteral obstruction: s/p stent. Urology following    4) CKD 3: Cr at baseline. Monitor     5) CAD: cont ASA    6) Mental retardation: has chronic dysarthria.   Poor insight    Total time spent with patient: 30 [address] discussed with: Patient, nursing    Discussed:  Care Plan    Prophylaxis:  Hep SQ    Disposition:  Home w/Family           ___________________________________________________    Attending Physician: Dex Sesay MD oral

## 2024-02-08 NOTE — MR AVS SNAPSHOT
Visit Information Date & Time Provider Department Dept. Phone Encounter #  
 3/27/2017 12:30 PM [facility]i [facility], [address] [id number] Follow-up Instructions Return in about 1 month (around 4/27/2017). Upcoming Health Maintenance Date Due  
 EYE EXAM RETINAL OR DILATED Q1 2/24/1966 PAP AKA CERVICAL CYTOLOGY 2/24/1977 FOBT Q 1 YEAR AGE 50-75 5/12/2010 BREAST CANCER SCRN MAMMOGRAM 8/17/2012 LIPID PANEL Q1 6/24/2015 FOOT EXAM Q1 11/24/2015 ZOSTER VACCINE AGE 60> 2/24/2016 MICROALBUMIN Q1 10/26/2016 HEMOGLOBIN A1C Q6M 5/29/2017 Pneumococcal 19-64 Highest Risk (3 of 3 - PPSV23) 9/23/2019 DTaP/Tdap/Td series (2 - Td) 10/26/2025 Allergies as of 3/27/2017  Review Complete On: 3/27/2017 By: Aide Lieberman Severity Noted Reaction Type Reactions Hydrocodone  04/07/2011   Side Effect Nausea Only Ibuprofen  04/07/2011    Unknown (comments) Pt cannot take because of having only one kidney Penicillins  03/31/2011   Topical Rash Tolerates Cephalexin Current Immunizations  Reviewed on 11/24/2014 Name Date Influenza Vaccine 9/10/2012, 10/5/2010 Influenza Vaccine (Quad) PF 12/1/2016  1:18 PM, 10/26/2015, 11/24/2014 Pneumococcal Conjugate (PCV-13) 1/23/2017 Pneumococcal Polysaccharide (PPSV-23) 9/23/2014 12:00 PM  
 Td 5/13/2011 Tdap 10/26/2015 Not reviewed this visit You Were Diagnosed With   
  
 Codes Comments Elevated blood sugar    -  Primary ICD-10-CM: R73.9 ICD-9-CM: 790.29 Type 2 diabetes mellitus without complication, without long-term current use of insulin (HCC)     ICD-10-CM: E11.9 ICD-9-CM: 250.00 Vitals BP Pulse Temp Weight(growth percentile) BMI OB Status 133/76 (BP 1 Location: Left arm, BP Patient Position: Sitting) 68 98.4 °F (36.9 °C) (Oral) 152 lb (68.9 kg) 34.1 kg/m2 Hysterectomy Smoking Status Never Smoker Vitals History BMI and BSA Data Body Mass Index Body Surface Area  
 34.1 kg/m 2 1.65 m 2 Preferred Pharmacy Pharmacy Name Phone [facility] [address]  [phone] Your Updated Medication List  
  
   
This list is accurate as of: 3/27/17  1:36 PM.  Always use your most recent med list.  
  
  
  
  
 ACTOS 30 mg tablet Generic drug:  pioglitazone Take 30 mg by mouth daily. aspirin delayed-release 81 mg tablet Take 81 mg by mouth nightly. atorvastatin 10 mg tablet Commonly known as:  LIPITOR Take 1 Tab by mouth daily. To start this when you run out of crestor  
  
 cyanocobalamin 1,000 mcg tablet Take 1 Tab by mouth daily. For B12  
  
 glipiZIDE SR 2.5 mg CR tablet Commonly known as:  GLUCOTROL XL Take 1 Tab by mouth Daily (before breakfast). HYDROcodone-acetaminophen 5-325 mg per tablet Commonly known as:  Nelma Domingo Take 1 Tab by mouth every four (4) hours as needed for up to 15 doses. Max Daily Amount: 6 Tabs. metoprolol tartrate 25 mg tablet Commonly known as:  LOPRESSOR Take 12.5 mg by mouth two (2) times a day. multivitamin with iron tablet Take 1 Tab by mouth daily. sertraline 50 mg tablet Commonly known as:  ZOLOFT  
take 1 tablet by mouth once daily (BEFORE BREAKFAST)  
  
 tolterodine ER 2 mg ER capsule Commonly known as:  DETROL-LA Take 2 mg by mouth nightly. We Performed the Following AMB POC GLUCOSE BLOOD, BY GLUCOSE MONITORING DEVICE [64110 CPT(R)] Follow-up Instructions Return in about 1 month (around 4/27/2017). To-Do List   
 03/27/2017 Lab:  HEMOGLOBIN A1C WITH EAG   
  
 03/27/2017 Lab:  LIPID PANEL   
  
 03/27/2017 Lab:  METABOLIC PANEL, COMPREHENSIVE   
  
 03/27/2017 Lab:  TSH 3RD GENERATION Introducing [facility]! [employer] introduces [employer] patient portal. Now you can access parts of your medical record, email your doctor's office, and request medication refills online. 1. In your internet browser, go to [email or web address] 2. Click on the First Time User? Click Here link in the Sign In box. You will see the New Member Sign Up page. 3. Enter your [employer] Access Code exactly as it appears below. You will not need to use this code after youve completed the sign-up process. If you do not sign up before the expiration date, you must request a new code. · [employer] Access Code: [email or web address] Expires: 6/25/2017  1:36 PM 
 
4. Enter the last four digits of your Social Security Number (xxxx) and Date of Birth (mm/dd/yyyy) as indicated and click Submit. You will be taken to the next sign-up page. 5. Create a [employer] ID. This will be your [employer] login ID and cannot be changed, so think of one that is secure and easy to remember. 6. Create a [employer] password. You can change your password at any time. 7. Enter your Password Reset Question and Answer. This can be used at a later time if you forget your password. 8. Enter your e-mail address. You will receive e-mail notification when new information is available in [address] 9. Click Sign Up. You can now view and download portions of your medical record. 10. Click the Download Summary menu link to download a portable copy of your medical information. If you have questions, please visit the Frequently Asked Questions section of the [employer] website. Remember, [employer] is NOT to be used for urgent needs. For medical emergencies, dial 911. Now available from your iPhone and Android! Please provide this summary of care documentation to your next provider. Your primary care clinician is listed as Brian Khan. If you have any questions after today's visit, please call [phone]. 3 = A little assistance

## 2024-09-10 NOTE — CDMP QUERY
Per the [facility] Association, \"Individuals who are overweight, obese, or morbidly obese are at an increased risk for certain medical conditions when compared to persons of normal weight. Therefore, these conditions are always clinically significant and reportable. \"    Patient is noted to have a BMI of 35.98 kg/m2 ( 4'8\", 160 lbs ). Please clarify if this patient is:     =>Obese   =>Overweight  =>Other explanation of clinical findings  =>Unable to determine (no explanation for clinical findings)    Please clarify and document your clinical opinion in the progress notes and discharge summary, including the definitive and or presumptive diagnosis, (suspected or probable), related to the above clinical findings. Please include clinical findings supporting your diagnosis.      Lance Ching [facility], [address] [facility]  [email or web address]
Unknown

## 2025-05-22 NOTE — PROGRESS NOTES
Bedside and Verbal shift change report given to [id number] [place] [address] (oncoming nurse) by Robyn Garcia RN  (offgoing nurse). Report included the following information SBAR, Kardex, ED Summary, Procedure Summary, Intake/Output, MAR, Recent Results and Med Rec Status. no fever/no chills/no malaise/no fatigue

## (undated) DEVICE — MEDI-VAC NON-CONDUCTIVE SUCTION TUBING: Brand: CARDINAL HEALTH

## (undated) DEVICE — SYR 10ML LUER LOK 1/5ML GRAD --

## (undated) DEVICE — GUIDEWIRE ENDOSCP L150CM DIA0.035IN TIP L15CM BENT PTFE

## (undated) DEVICE — GOWN,SIRUS,FABRNF,XL,20/CS: Brand: MEDLINE

## (undated) DEVICE — (D)SYR 10ML 1/5ML GRAD NSAF -- PKGING CHANGE USE ITEM 338027

## (undated) DEVICE — Z DISCONTINUEDSOLUTION PREP 2OZ 10% POVIDONE IOD SCR CAP BTL

## (undated) DEVICE — Y-TYPE TUR IRRIGATION SET

## (undated) DEVICE — SOLUTION IRRIGATION H2O 0797305] ICU MEDICAL INC]

## (undated) DEVICE — LIGHT HANDLE: Brand: DEVON

## (undated) DEVICE — BAG COLLECTION FLD OR-TBL NS --

## (undated) DEVICE — STERILE POLYISOPRENE POWDER-FREE SURGICAL GLOVES: Brand: PROTEXIS

## (undated) DEVICE — JELLY,LUBE,STERILE,FLIP TOP,TUBE,4-OZ: Brand: MEDLINE

## (undated) DEVICE — CYSTOSCOPY PACK: Brand: CONVERTORS

## (undated) DEVICE — CONTAINER,SPECIMEN,3OZ,OR STRL: Brand: MEDLINE

## (undated) DEVICE — CYSTO/BLADDER IRRIGATION SET, REGULATING CLAMP

## (undated) DEVICE — PACK,CYSTOSCOPY,PK III,SIRUS: Brand: MEDLINE

## (undated) DEVICE — SOLUTION IRRIG 3000ML 0.9% SOD CHL FLX CONT 0797208] ICU MEDICAL INC]

## (undated) DEVICE — DEVON™ KNEE AND BODY STRAP 60" X 3" (1.5 M X 7.6 CM): Brand: DEVON

## (undated) DEVICE — OPEN-END URETERAL CATHETER: Brand: COOK

## (undated) DEVICE — INFECTION CONTROL KIT SYS

## (undated) DEVICE — CATH URET 8FRX65CM OPN CONE --

## (undated) DEVICE — SKIN MARKER,REGULAR TIP WITH RULER AND LABELS: Brand: DEVON

## (undated) DEVICE — COVER LT HNDL BLU PLAS

## (undated) DEVICE — 4-PORT MANIFOLD: Brand: NEPTUNE 2

## (undated) DEVICE — SKIN MARKER FINE TIP WITH RULER: Brand: DEVON

## (undated) DEVICE — KENDALL SCD EXPRESS SLEEVES, KNEE LENGTH, MEDIUM: Brand: KENDALL SCD